# Patient Record
Sex: FEMALE | Race: WHITE | NOT HISPANIC OR LATINO | Employment: UNEMPLOYED | URBAN - METROPOLITAN AREA
[De-identification: names, ages, dates, MRNs, and addresses within clinical notes are randomized per-mention and may not be internally consistent; named-entity substitution may affect disease eponyms.]

---

## 2017-02-13 ENCOUNTER — PATIENT MESSAGE (OUTPATIENT)
Dept: INTERNAL MEDICINE | Facility: CLINIC | Age: 42
End: 2017-02-13

## 2017-02-13 RX ORDER — OSELTAMIVIR PHOSPHATE 75 MG/1
75 CAPSULE ORAL 2 TIMES DAILY
Qty: 10 CAPSULE | Refills: 0 | Status: SHIPPED | OUTPATIENT
Start: 2017-02-13 | End: 2017-02-18

## 2017-04-23 ENCOUNTER — HOSPITAL ENCOUNTER (EMERGENCY)
Facility: HOSPITAL | Age: 42
Discharge: HOME | End: 2017-04-23
Attending: EMERGENCY MEDICINE
Payer: COMMERCIAL

## 2017-04-23 VITALS
HEART RATE: 61 BPM | TEMPERATURE: 98 F | SYSTOLIC BLOOD PRESSURE: 111 MMHG | RESPIRATION RATE: 16 BRPM | OXYGEN SATURATION: 100 % | HEIGHT: 65 IN | WEIGHT: 180 LBS | BODY MASS INDEX: 29.99 KG/M2 | DIASTOLIC BLOOD PRESSURE: 69 MMHG

## 2017-04-23 DIAGNOSIS — R10.9 ABDOMINAL PAIN: ICD-10-CM

## 2017-04-23 LAB
ALBUMIN SERPL BCP-MCNC: 3.9 G/DL
ALP SERPL-CCNC: 61 U/L
ALT SERPL W/O P-5'-P-CCNC: 15 U/L
ANION GAP SERPL CALC-SCNC: 9 MMOL/L
AST SERPL-CCNC: 15 U/L
B-HCG UR QL: NEGATIVE
BACTERIA #/AREA URNS HPF: ABNORMAL /HPF
BASOPHILS # BLD AUTO: 0.04 K/UL
BASOPHILS NFR BLD: 0.6 %
BILIRUB SERPL-MCNC: 0.4 MG/DL
BILIRUB UR QL STRIP: NEGATIVE
BUN SERPL-MCNC: 10 MG/DL
CALCIUM SERPL-MCNC: 9.4 MG/DL
CHLORIDE SERPL-SCNC: 108 MMOL/L
CLARITY UR: CLEAR
CO2 SERPL-SCNC: 25 MMOL/L
COLOR UR: YELLOW
CREAT SERPL-MCNC: 0.8 MG/DL
DIFFERENTIAL METHOD: NORMAL
EOSINOPHIL # BLD AUTO: 0.1 K/UL
EOSINOPHIL NFR BLD: 1.5 %
ERYTHROCYTE [DISTWIDTH] IN BLOOD BY AUTOMATED COUNT: 13 %
EST. GFR  (AFRICAN AMERICAN): >60 ML/MIN/1.73 M^2
EST. GFR  (NON AFRICAN AMERICAN): >60 ML/MIN/1.73 M^2
GLUCOSE SERPL-MCNC: 98 MG/DL
GLUCOSE UR QL STRIP: NEGATIVE
HCT VFR BLD AUTO: 37.8 %
HGB BLD-MCNC: 12.3 G/DL
HGB UR QL STRIP: ABNORMAL
KETONES UR QL STRIP: NEGATIVE
LEUKOCYTE ESTERASE UR QL STRIP: ABNORMAL
LIPASE SERPL-CCNC: 33 U/L
LYMPHOCYTES # BLD AUTO: 2.2 K/UL
LYMPHOCYTES NFR BLD: 34 %
MCH RBC QN AUTO: 29.1 PG
MCHC RBC AUTO-ENTMCNC: 32.5 %
MCV RBC AUTO: 89 FL
MICROSCOPIC COMMENT: ABNORMAL
MONOCYTES # BLD AUTO: 0.4 K/UL
MONOCYTES NFR BLD: 6.7 %
NEUTROPHILS # BLD AUTO: 3.8 K/UL
NEUTROPHILS NFR BLD: 57.2 %
NITRITE UR QL STRIP: NEGATIVE
PH UR STRIP: 7 [PH] (ref 5–8)
PLATELET # BLD AUTO: 253 K/UL
PMV BLD AUTO: 10.3 FL
POTASSIUM SERPL-SCNC: 3.9 MMOL/L
PROT SERPL-MCNC: 7 G/DL
PROT UR QL STRIP: NEGATIVE
RBC # BLD AUTO: 4.23 M/UL
RBC #/AREA URNS HPF: 1 /HPF (ref 0–4)
SODIUM SERPL-SCNC: 142 MMOL/L
SP GR UR STRIP: <=1.005 (ref 1–1.03)
SQUAMOUS #/AREA URNS HPF: 3 /HPF
URN SPEC COLLECT METH UR: ABNORMAL
UROBILINOGEN UR STRIP-ACNC: NEGATIVE EU/DL
WBC # BLD AUTO: 6.59 K/UL
WBC #/AREA URNS HPF: 6 /HPF (ref 0–5)

## 2017-04-23 PROCEDURE — 80053 COMPREHEN METABOLIC PANEL: CPT

## 2017-04-23 PROCEDURE — 99284 EMERGENCY DEPT VISIT MOD MDM: CPT

## 2017-04-23 PROCEDURE — 25500020 PHARM REV CODE 255: Performed by: EMERGENCY MEDICINE

## 2017-04-23 PROCEDURE — 85025 COMPLETE CBC W/AUTO DIFF WBC: CPT

## 2017-04-23 PROCEDURE — 83690 ASSAY OF LIPASE: CPT

## 2017-04-23 PROCEDURE — 25000003 PHARM REV CODE 250: Performed by: EMERGENCY MEDICINE

## 2017-04-23 PROCEDURE — 81000 URINALYSIS NONAUTO W/SCOPE: CPT

## 2017-04-23 PROCEDURE — 81025 URINE PREGNANCY TEST: CPT

## 2017-04-23 RX ORDER — ONDANSETRON 2 MG/ML
4 INJECTION INTRAMUSCULAR; INTRAVENOUS
Status: DISCONTINUED | OUTPATIENT
Start: 2017-04-23 | End: 2017-04-23 | Stop reason: HOSPADM

## 2017-04-23 RX ORDER — DICYCLOMINE HYDROCHLORIDE 20 MG/1
20 TABLET ORAL 2 TIMES DAILY
Qty: 10 TABLET | Refills: 0 | Status: SHIPPED | OUTPATIENT
Start: 2017-04-23 | End: 2017-04-27

## 2017-04-23 RX ORDER — DICYCLOMINE HYDROCHLORIDE 20 MG/1
20 TABLET ORAL
Status: COMPLETED | OUTPATIENT
Start: 2017-04-23 | End: 2017-04-23

## 2017-04-23 RX ADMIN — IOHEXOL 75 ML: 350 INJECTION, SOLUTION INTRAVENOUS at 03:04

## 2017-04-23 RX ADMIN — DICYCLOMINE HYDROCHLORIDE 20 MG: 20 TABLET ORAL at 02:04

## 2017-04-23 NOTE — DISCHARGE INSTRUCTIONS
Abdominal Pain    Abdominal pain is pain in the stomach or belly area. Everyone has this pain from time to time. In many cases it goes away on its own. But abdominal pain can sometimes be due to a serious problem, such as appendicitis. So its important to know when to seek help.  Causes of abdominal pain  There are many possible causes of abdominal pain. Common causes in adults include:  · Constipation, diarrhea, or gas  · Stomach acid flowing back up into the esophagus (acid reflux or heartburn)  · Severe acid reflux, called GERD (gastroesophageal reflux disease)  · A sore in the lining of the stomach or small intestine (peptic ulcer)  · Inflammation of the gallbladder, liver, or pancreas  · Gallstones or kidney stones  · Appendicitis   · Intestinal blockage   · An internal organ pushing through a muscle or other tissue (hernia)  · Urinary tract infections  · In women, menstrual cramps, fibroids, or endometriosis  · Inflammation or infection of the intestines  Diagnosing the cause of abdominal pain  Your healthcare provider will do a physical exam help find the cause of your pain. If needed, tests will be ordered. Belly pain has many possible causes. So it can be hard to find the reason for your pain. Giving details about your pain can help. Tell your provider where and when you feel the pain, and what makes it better or worse. Also let your provider know if you have other symptoms such as:  · Fever  · Tiredness  · Upset stomach (nausea)  · Vomiting  · Changes in bathroom habits  Treating abdominal pain  Some causes of pain need emergency medical treatment right away. These include appendicitis or a bowel blockage. Other problems can be treated with rest, fluids, or medicines. Your healthcare provider can give you specific instructions for treatment or self-care based on what is causing your pain.  If you have vomiting or diarrhea, sip water or other clear fluids. When you are ready to eat solid foods again,  start with small amounts of easy-to-digest, low-fat foods. These include apple sauce, toast, or crackers.   When to seek medical care  Call 911 or go to the hospital right away if you:  · Cant pass stool and are vomiting  · Are vomiting blood or have bloody diarrhea or black, tarry diarrhea  · Have chest, neck, or shoulder pain  · Feel like you might pass out  · Have pain in your shoulder blades with nausea  · Have sudden, severe belly pain  · Have new, severe pain unlike any you have felt before  · Have a belly that is rigid, hard, and tender to touch  Call your healthcare provider if you have:  · Pain for more than 5 days  · Bloating for more than 2 days  · Diarrhea for more than 5 days  · A fever of 100.4°F (38.0°C) or higher, or as directed by your provider  · Pain that gets worse  · Weight loss for no reason  · Continued lack of appetite  · Blood in your stool  How to prevent abdominal pain  Here are some tips to help prevent abdominal pain:  · Eat smaller amounts of food at one time.  · Avoid greasy, fried, or other high-fat foods.  · Avoid foods that give you gas.  · Exercise regularly.  · Drink plenty of fluids.  To help prevent GERD symptoms:  · Quit smoking.  · Reduce alcohol and certain foods that increase stomach acid.  · Avoid aspirin and over-the-counter pain and fever medicines (NSAIDS or nonsteroidal anti-inflammatory drugs), if possible  · Lose extra weight.  · Finish eating at least 2 hours before you go to bed or lie down.  · Raise the head of your bed.  Date Last Reviewed: 7/1/2016  © 0517-8439 [employer]. [address] [place] [address], [place] [address]. All rights reserved. This information is not intended as a substitute for professional medical care. Always follow your healthcare professional's instructions.          Abdominal Pain    Abdominal pain is pain in the stomach or belly area. Everyone has this pain from time to time. In many cases it goes away on its own. But abdominal  pain can sometimes be due to a serious problem, such as appendicitis. So its important to know when to seek help.  Causes of abdominal pain  There are many possible causes of abdominal pain. Common causes in adults include:  · Constipation, diarrhea, or gas  · Stomach acid flowing back up into the esophagus (acid reflux or heartburn)  · Severe acid reflux, called GERD (gastroesophageal reflux disease)  · A sore in the lining of the stomach or small intestine (peptic ulcer)  · Inflammation of the gallbladder, liver, or pancreas  · Gallstones or kidney stones  · Appendicitis   · Intestinal blockage   · An internal organ pushing through a muscle or other tissue (hernia)  · Urinary tract infections  · In women, menstrual cramps, fibroids, or endometriosis  · Inflammation or infection of the intestines  Diagnosing the cause of abdominal pain  Your healthcare provider will do a physical exam help find the cause of your pain. If needed, tests will be ordered. Belly pain has many possible causes. So it can be hard to find the reason for your pain. Giving details about your pain can help. Tell your provider where and when you feel the pain, and what makes it better or worse. Also let your provider know if you have other symptoms such as:  · Fever  · Tiredness  · Upset stomach (nausea)  · Vomiting  · Changes in bathroom habits  Treating abdominal pain  Some causes of pain need emergency medical treatment right away. These include appendicitis or a bowel blockage. Other problems can be treated with rest, fluids, or medicines. Your healthcare provider can give you specific instructions for treatment or self-care based on what is causing your pain.  If you have vomiting or diarrhea, sip water or other clear fluids. When you are ready to eat solid foods again, start with small amounts of easy-to-digest, low-fat foods. These include apple sauce, toast, or crackers.   When to seek medical care  Call 911 or go to the hospital right  away if you:  · Cant pass stool and are vomiting  · Are vomiting blood or have bloody diarrhea or black, tarry diarrhea  · Have chest, neck, or shoulder pain  · Feel like you might pass out  · Have pain in your shoulder blades with nausea  · Have sudden, severe belly pain  · Have new, severe pain unlike any you have felt before  · Have a belly that is rigid, hard, and tender to touch  Call your healthcare provider if you have:  · Pain for more than 5 days  · Bloating for more than 2 days  · Diarrhea for more than 5 days  · A fever of 100.4°F (38.0°C) or higher, or as directed by your provider  · Pain that gets worse  · Weight loss for no reason  · Continued lack of appetite  · Blood in your stool  How to prevent abdominal pain  Here are some tips to help prevent abdominal pain:  · Eat smaller amounts of food at one time.  · Avoid greasy, fried, or other high-fat foods.  · Avoid foods that give you gas.  · Exercise regularly.  · Drink plenty of fluids.  To help prevent GERD symptoms:  · Quit smoking.  · Reduce alcohol and certain foods that increase stomach acid.  · Avoid aspirin and over-the-counter pain and fever medicines (NSAIDS or nonsteroidal anti-inflammatory drugs), if possible  · Lose extra weight.  · Finish eating at least 2 hours before you go to bed or lie down.  · Raise the head of your bed.  Date Last Reviewed: 7/1/2016  © 2941-8551 [employer]. [address] [place] [address], [place] [address]. All rights reserved. This information is not intended as a substitute for professional medical care. Always follow your healthcare professional's instructions.

## 2017-04-23 NOTE — ED TRIAGE NOTES
Pt c/o abd pain above umbilicus, that started upon waking this AM at 0500.  Pt denies N/V/D.  Pt reports increased urinary frequency.  LMP 4/21/17.

## 2017-04-23 NOTE — ED PROVIDER NOTES
"SCRIBE #1 NOTE: I, Lashawn Nogueira, am scribing for, and in the presence of, Bentley Cassidy MD. I have scribed the entire note.      History      Chief Complaint   Patient presents with    Abdominal Pain     c/o severe upper middle abd pain, denies NVD       Review of patient's allergies indicates:   Allergen Reactions    No known drug allergies         HPI   HPI    4/23/2017, 2:08 PM   History obtained from the patient      History of Present Illness: Josh Simental is a 42 y.o. female patient who presents to the Emergency Department for abd pain which onset gradually earlier today. Abd pain is located to umbilical region and does not radiate. Symptoms are constant and moderate in severity. Sxs exacerbate with laying down. Pt describes abd pain as "sharp". No mitigating factors reported. Associated sxs include urinary frequency. Patient denies any nausea, emesis, diarrhea, hematochezia, hematuria, dysuria, constipation, fever, chills, and all other sxs at this time. Pt states that she is currently menstruating. No further complaints or concerns at this time.         Arrival mode: Personal vehicle      PCP: Oneil Metz MD       Past Medical History:  Past Medical History:   Diagnosis Date    Allergy     Anxiety     Depression     Migraines     Sinusitis        Past Surgical History:  Past Surgical History:   Procedure Laterality Date    TUBAL LIGATION           Family History:  Family History   Problem Relation Age of Onset    Diabetes Mother     Thyroid disease Mother     Diabetes Father     Cancer Father     Cancer Maternal Grandmother     Diabetes Maternal Grandfather     Cancer Maternal Grandfather     Stroke Paternal Grandmother        Social History:  Social History     Social History Main Topics    Smoking status: Former Smoker     Packs/day: 0.25     Years: 2.00     Types: Cigarettes     Quit date: 6/17/1999    Smokeless tobacco: Never Used      Comment: maybe 1 pk a week     " Alcohol use No    Drug use: No    Sexual activity: Yes     Partners: Male     Birth control/ protection: None       ROS   Review of Systems   Constitutional: Negative for chills and fever.   HENT: Negative for sore throat.    Respiratory: Negative for shortness of breath.    Cardiovascular: Negative for chest pain.   Gastrointestinal: Positive for abdominal pain. Negative for blood in stool, constipation, diarrhea, nausea and vomiting.   Genitourinary: Positive for frequency. Negative for dysuria and hematuria.   Musculoskeletal: Negative for back pain.   Skin: Negative for rash.   Neurological: Negative for weakness.   Hematological: Does not bruise/bleed easily.   All other systems reviewed and are negative.      Physical Exam    Initial Vitals   BP Pulse Resp Temp SpO2   04/23/17 1317 04/23/17 1317 04/23/17 1317 04/23/17 1317 04/23/17 1317   122/68 61 16 98.1 °F (36.7 °C) 100 %      Physical Exam  Nursing Notes and Vital Signs Reviewed.  Constitutional: Patient is in no apparent distress. Awake and alert. Well-developed and well-nourished.  Head: Atraumatic. Normocephalic.  Eyes: PERRL. EOM intact. Conjunctivae are not pale. No scleral icterus.  ENT: Mucous membranes are moist. Oropharynx is clear and symmetric.    Neck: Supple. Full ROM. No lymphadenopathy.  Cardiovascular: Regular rate. Regular rhythm. No murmurs, rubs, or gallops. Distal pulses are 2+ and symmetric.  Pulmonary/Chest: No respiratory distress. Clear to auscultation bilaterally. No wheezing, rales, or rhonchi.  Abdominal: Soft and non-distended.  Generalized abd tenderness.  No rebound, guarding, or rigidity. Good bowel sounds.  Genitourinary: No CVA tenderness  Musculoskeletal: Moves all extremities. No obvious deformities. No edema. No calf tenderness.  Skin: Warm and dry.  Neurological: Grossly and neurologically intact and oriented x3.  Psychiatric: Normal affect. Good eye contact. Appropriate in content.    ED Course    Procedures  ED  "Vital Signs:  Vitals:    04/23/17 1317   BP: 122/68   Pulse: 61   Resp: 16   Temp: 98.1 °F (36.7 °C)   TempSrc: Oral   SpO2: 100%   Weight: 81.6 kg (180 lb)   Height: 5' 5" (1.651 m)       Abnormal Lab Results:  Labs Reviewed   URINALYSIS - Abnormal; Notable for the following:        Result Value    Specific Gravity, UA <=1.005 (*)     Occult Blood UA 2+ (*)     Leukocytes, UA Trace (*)     All other components within normal limits   URINALYSIS MICROSCOPIC - Abnormal; Notable for the following:     WBC, UA 6 (*)     All other components within normal limits   CBC W/ AUTO DIFFERENTIAL   COMPREHENSIVE METABOLIC PANEL   PREGNANCY TEST, URINE RAPID   LIPASE        All Lab Results:  Results for orders placed or performed during the hospital encounter of 04/23/17   CBC auto differential   Result Value Ref Range    WBC 6.59 3.90 - 12.70 K/uL    RBC 4.23 4.00 - 5.40 M/uL    Hemoglobin 12.3 12.0 - 16.0 g/dL    Hematocrit 37.8 37.0 - 48.5 %    MCV 89 82 - 98 fL    MCH 29.1 27.0 - 31.0 pg    MCHC 32.5 32.0 - 36.0 %    RDW 13.0 11.5 - 14.5 %    Platelets 253 150 - 350 K/uL    MPV 10.3 9.2 - 12.9 fL    Gran # 3.8 1.8 - 7.7 K/uL    Lymph # 2.2 1.0 - 4.8 K/uL    Mono # 0.4 0.3 - 1.0 K/uL    Eos # 0.1 0.0 - 0.5 K/uL    Baso # 0.04 0.00 - 0.20 K/uL    Gran% 57.2 38.0 - 73.0 %    Lymph% 34.0 18.0 - 48.0 %    Mono% 6.7 4.0 - 15.0 %    Eosinophil% 1.5 0.0 - 8.0 %    Basophil% 0.6 0.0 - 1.9 %    Differential Method Automated    Comprehensive metabolic panel   Result Value Ref Range    Sodium 142 136 - 145 mmol/L    Potassium 3.9 3.5 - 5.1 mmol/L    Chloride 108 95 - 110 mmol/L    CO2 25 23 - 29 mmol/L    Glucose 98 70 - 110 mg/dL    BUN, Bld 10 6 - 20 mg/dL    Creatinine 0.8 0.5 - 1.4 mg/dL    Calcium 9.4 8.7 - 10.5 mg/dL    Total Protein 7.0 6.0 - 8.4 g/dL    Albumin 3.9 3.5 - 5.2 g/dL    Total Bilirubin 0.4 0.1 - 1.0 mg/dL    Alkaline Phosphatase 61 55 - 135 U/L    AST 15 10 - 40 U/L    ALT 15 10 - 44 U/L    Anion Gap 9 8 - 16 mmol/L "    eGFR if African American >60 >60 mL/min/1.73 m^2    eGFR if non African American >60 >60 mL/min/1.73 m^2   Urinalysis   Result Value Ref Range    Specimen UA Urine, Clean Catch     Color, UA Yellow Yellow, Straw, Jennifer    Appearance, UA Clear Clear    pH, UA 7.0 5.0 - 8.0    Specific Gravity, UA <=1.005 (A) 1.005 - 1.030    Protein, UA Negative Negative    Glucose, UA Negative Negative    Ketones, UA Negative Negative    Bilirubin (UA) Negative Negative    Occult Blood UA 2+ (A) Negative    Nitrite, UA Negative Negative    Urobilinogen, UA Negative <2.0 EU/dL    Leukocytes, UA Trace (A) Negative   Pregnancy, urine rapid   Result Value Ref Range    Preg Test, Ur Negative    Lipase   Result Value Ref Range    Lipase 33 4 - 60 U/L   Urinalysis Microscopic   Result Value Ref Range    RBC, UA 1 0 - 4 /hpf    WBC, UA 6 (H) 0 - 5 /hpf    Bacteria, UA Occasional None-Occ /hpf    Squam Epithel, UA 3 /hpf    Microscopic Comment SEE COMMENT          Imaging Results:  Imaging Results         CT Abdomen Pelvis With Contrast (Final result) Result time:  04/23/17 16:27:48    Final result by Fili Grimes Jr., MD (04/23/17 16:27:48)    Impression:      1. There is cholelithiasis.  No inflammatory change about the gallbladder on CT evaluation.    2.  The appendix is normal.        Electronically signed by: FILI GRIMES M.D.  Date:     04/23/17  Time:    16:27     Narrative:    CT ABDOMEN PELVIS WITH CONTRAST    Clinical history: abdominal pain    TECHNIQUE: Routine abdominal and pelvic CT performed after the IV administration of 75 mL Omnipaque 350. Coronal and sagittal images obtained. All CT scans at this facility use dose modulation, iterative reconstruction, and/or weight based dosing when appropriate to reduce radiation dose to as low as reasonably achievable.    Comparison: None    Findings: No acute disease is seen in the lung bases.  No acute osseous abnormality or suspicious bone marrow lesion identified.     The  distal esophagus, stomach, and small bowel loops are normal.  There is gas and stool within the colon.  No evidence of bowel inflammation.  The appendix is normal. No free intraperitoneal fluid, free air or abscess identified.    The liver is within normal limits. There cholesterol stones within the gallbladder.  No inflammatory change about the gallbladder. The pancreas, spleen and adrenals are unremarkable. No aortic aneurysm is identified. The kidneys and ureters are unremarkable. The bladder is within normal limits. The uterus is mildly enlarged.  Slight thickening of the endometrial stripe.  This may be physiologic.  The ovaries are not unusual in appearance. No pathologically enlarged lymph nodes are identified.                    The Emergency Provider reviewed the vital signs and test results, which are outlined above.    ED Discussion   4:31 PM: Reassessed pt at this time. Pt is awake, alert and in no distress. Pt states her condition has improved at this time. Discussed with pt all pertinent ED information and results. Discussed pt dx and plan of tx. Gave pt all f/u and return to the ED instructions. All questions and concerns were addressed at this time. Pt expresses understanding of information and instructions, and is comfortable with plan to discharge. Pt is stable for discharge.    I discussed with patient and/or family/caretaker that evaluation in the ED does not suggest any emergent or life threatening medical conditions requiring immediate intervention beyond what was provided in the ED, and I believe patient is safe for discharge.  Regardless, an unremarkable evaluation in the ED does not preclude the development or presence of a serious of life threatening condition. As such, patient was instructed to return immediately for any worsening or change in current symptoms.      ED Medication(s):  Medications   ondansetron injection 4 mg (4 mg Intravenous Not Given 4/23/17 1415)   dicyclomine tablet 20  mg (20 mg Oral Given 4/23/17 1448)   omnipaque 350 iohexol 75 mL (75 mLs Intravenous Given 4/23/17 8738)       New Prescriptions    No medications on file             Medical Decision Making    Medical Decision Making:   Clinical Tests:   Lab Tests: Ordered and Reviewed  Radiological Study: Ordered and Reviewed           Scribe Attestation:   Scribe #1: I performed the above scribed service and the documentation accurately describes the services I performed. I attest to the accuracy of the note.    Attending:   Physician Attestation Statement for Scribe #1: I, Bentley Cassidy MD, personally performed the services described in this documentation, as scribed by Lashawn Nogueira, in my presence, and it is both accurate and complete.          Clinical Impression       ICD-10-CM ICD-9-CM   1. Abdominal pain R10.9 789.00       Disposition:   Disposition: Discharged  Condition: Stable         Bentley Cassidy MD  04/23/17 1945

## 2017-04-23 NOTE — ED AVS SNAPSHOT
[facility] -   [id number] [facility] [id number]               Josh Simental   2017  1:47 PM   ED    Description:  Female : 1975   Department:  [facility]           Your Care was Coordinated By:     Provider Role From To    Bentley Cassidy MD Attending Provider 17 1699 --      Reason for Visit     Abdominal Pain           Diagnoses this Visit        Comments    Abdominal pain           ED Disposition     None           To Do List           Follow-up Information     Follow up with Oneil Metz MD In 1 day.    Specialty:  Family Medicine    Why:  Patient should return to the ED for any concerns or worsening of condition.    Contact information:    [address] [address] [address] [phone]         These Medications        Disp Refills Start End    dicyclomine (BENTYL) 20 mg tablet 10 tablet 0 2017    Take 1 tablet (20 mg total) by mouth 2 (two) times daily. - Oral      [facility]     [facility] -  Assistance  Unless otherwise directed by your provider, please contact [facility], our nurse care line that is available for  assistance.     Registered nurses in the [facility] provide: appointment scheduling, clinical advisement, health education, and other advisory services.  Call: [phone] (toll free)               Medications           Message regarding Medications     Verify the changes and/or additions to your medication regime listed below are the same as discussed with your clinician today.  If any of these changes or additions are incorrect, please notify your healthcare provider.        START taking these NEW medications        Refills    dicyclomine (BENTYL) 20 mg tablet 0    Sig: Take 1 tablet (20 mg total) by mouth 2 (two) times daily.    Class: Print    Route: Oral      These medications were administered today        Dose  "Freq    dicyclomine tablet 20 mg 20 mg ED 1 Time    Sig: Take 1 tablet (20 mg total) by mouth ED 1 Time.    Class: Normal    Route: Oral    ondansetron injection 4 mg 4 mg ED 1 Time    Sig: Inject 4 mg into the vein ED 1 Time.    Class: Normal    Route: Intravenous    omnipaque 350 iohexol 75 mL 75 mL IMG once as needed    Sig: Inject 75 mLs into the vein ONCE PRN for contrast.    Class: Normal    Route: Intravenous           Verify that the below list of medications is an accurate representation of the medications you are currently taking.  If none reported, the list may be blank. If incorrect, please contact your healthcare provider. Carry this list with you in case of emergency.           Current Medications     ASCORBIC ACID/VITAMIN E/BIOTIN (HAIR, SKIN, NAILS WITH BIOTIN ORAL) Take by mouth.    LACTOBACILLUS ACIDOPHILUS (PROBIOTIC ORAL) Take 1 tablet by mouth once daily.    dicyclomine (BENTYL) 20 mg tablet Take 1 tablet (20 mg total) by mouth 2 (two) times daily.    ondansetron injection 4 mg Inject 4 mg into the vein ED 1 Time.           Clinical Reference Information           Your Vitals Were     BP Pulse Temp Resp Height Weight    122/68 (BP Location: Right arm, Patient Position: Sitting) 61 98.1 °F (36.7 °C) (Oral) 16 5' 5" (1.651 m) 81.6 kg (180 lb)    Last Period SpO2 BMI          04/21/2017 (Exact Date) 100% 29.95 kg/m2        Allergies as of 4/23/2017        Reactions    No Known Drug Allergies       Immunizations Administered on Date of Encounter - 4/23/2017     None      ED Micro, Lab, POCT     Start Ordered       Status Ordering Provider    04/23/17 1409 04/23/17 1409  CBC auto differential  STAT      Final result     04/23/17 1409 04/23/17 1409  Comprehensive metabolic panel  STAT      Final result     04/23/17 1409 04/23/17 1409  Urinalysis  STAT      Final result     04/23/17 1409 04/23/17 1409  Pregnancy, urine rapid  STAT      Final result     04/23/17 1409 04/23/17 1409  Lipase  STAT      " Final result     04/23/17 1409 04/23/17 1409  Urinalysis Microscopic  Once      Final result       ED Imaging Orders     Start Ordered       Status Ordering Provider    04/23/17 1409 04/23/17 1409  CT Abdomen Pelvis With Contrast  1 time imaging      Final result         Discharge Instructions         Abdominal Pain    Abdominal pain is pain in the stomach or belly area. Everyone has this pain from time to time. In many cases it goes away on its own. But abdominal pain can sometimes be due to a serious problem, such as appendicitis. So its important to know when to seek help.  Causes of abdominal pain  There are many possible causes of abdominal pain. Common causes in adults include:  · Constipation, diarrhea, or gas  · Stomach acid flowing back up into the esophagus (acid reflux or heartburn)  · Severe acid reflux, called GERD (gastroesophageal reflux disease)  · A sore in the lining of the stomach or small intestine (peptic ulcer)  · Inflammation of the gallbladder, liver, or pancreas  · Gallstones or kidney stones  · Appendicitis   · Intestinal blockage   · An internal organ pushing through a muscle or other tissue (hernia)  · Urinary tract infections  · In women, menstrual cramps, fibroids, or endometriosis  · Inflammation or infection of the intestines  Diagnosing the cause of abdominal pain  Your healthcare provider will do a physical exam help find the cause of your pain. If needed, tests will be ordered. Belly pain has many possible causes. So it can be hard to find the reason for your pain. Giving details about your pain can help. Tell your provider where and when you feel the pain, and what makes it better or worse. Also let your provider know if you have other symptoms such as:  · Fever  · Tiredness  · Upset stomach (nausea)  · Vomiting  · Changes in bathroom habits  Treating abdominal pain  Some causes of pain need emergency medical treatment right away. These include appendicitis or a bowel blockage.  Other problems can be treated with rest, fluids, or medicines. Your healthcare provider can give you specific instructions for treatment or self-care based on what is causing your pain.  If you have vomiting or diarrhea, sip water or other clear fluids. When you are ready to eat solid foods again, start with small amounts of easy-to-digest, low-fat foods. These include apple sauce, toast, or crackers.   When to seek medical care  Call 911 or go to the hospital right away if you:  · Cant pass stool and are vomiting  · Are vomiting blood or have bloody diarrhea or black, tarry diarrhea  · Have chest, neck, or shoulder pain  · Feel like you might pass out  · Have pain in your shoulder blades with nausea  · Have sudden, severe belly pain  · Have new, severe pain unlike any you have felt before  · Have a belly that is rigid, hard, and tender to touch  Call your healthcare provider if you have:  · Pain for more than 5 days  · Bloating for more than 2 days  · Diarrhea for more than 5 days  · A fever of 100.4°F (38.0°C) or higher, or as directed by your provider  · Pain that gets worse  · Weight loss for no reason  · Continued lack of appetite  · Blood in your stool  How to prevent abdominal pain  Here are some tips to help prevent abdominal pain:  · Eat smaller amounts of food at one time.  · Avoid greasy, fried, or other high-fat foods.  · Avoid foods that give you gas.  · Exercise regularly.  · Drink plenty of fluids.  To help prevent GERD symptoms:  · Quit smoking.  · Reduce alcohol and certain foods that increase stomach acid.  · Avoid aspirin and over-the-counter pain and fever medicines (NSAIDS or nonsteroidal anti-inflammatory drugs), if possible  · Lose extra weight.  · Finish eating at least 2 hours before you go to bed or lie down.  · Raise the head of your bed.  Date Last Reviewed: 7/1/2016  © 9226-5502 [email or web address]. [address] [place] [address], [place] [address]. All rights reserved. This information  is not intended as a substitute for professional medical care. Always follow your healthcare professional's instructions.          Abdominal Pain    Abdominal pain is pain in the stomach or belly area. Everyone has this pain from time to time. In many cases it goes away on its own. But abdominal pain can sometimes be due to a serious problem, such as appendicitis. So its important to know when to seek help.  Causes of abdominal pain  There are many possible causes of abdominal pain. Common causes in adults include:  · Constipation, diarrhea, or gas  · Stomach acid flowing back up into the esophagus (acid reflux or heartburn)  · Severe acid reflux, called GERD (gastroesophageal reflux disease)  · A sore in the lining of the stomach or small intestine (peptic ulcer)  · Inflammation of the gallbladder, liver, or pancreas  · Gallstones or kidney stones  · Appendicitis   · Intestinal blockage   · An internal organ pushing through a muscle or other tissue (hernia)  · Urinary tract infections  · In women, menstrual cramps, fibroids, or endometriosis  · Inflammation or infection of the intestines  Diagnosing the cause of abdominal pain  Your healthcare provider will do a physical exam help find the cause of your pain. If needed, tests will be ordered. Belly pain has many possible causes. So it can be hard to find the reason for your pain. Giving details about your pain can help. Tell your provider where and when you feel the pain, and what makes it better or worse. Also let your provider know if you have other symptoms such as:  · Fever  · Tiredness  · Upset stomach (nausea)  · Vomiting  · Changes in bathroom habits  Treating abdominal pain  Some causes of pain need emergency medical treatment right away. These include appendicitis or a bowel blockage. Other problems can be treated with rest, fluids, or medicines. Your healthcare provider can give you specific instructions for treatment or self-care based on what is  causing your pain.  If you have vomiting or diarrhea, sip water or other clear fluids. When you are ready to eat solid foods again, start with small amounts of easy-to-digest, low-fat foods. These include apple sauce, toast, or crackers.   When to seek medical care  Call 911 or go to the hospital right away if you:  · Cant pass stool and are vomiting  · Are vomiting blood or have bloody diarrhea or black, tarry diarrhea  · Have chest, neck, or shoulder pain  · Feel like you might pass out  · Have pain in your shoulder blades with nausea  · Have sudden, severe belly pain  · Have new, severe pain unlike any you have felt before  · Have a belly that is rigid, hard, and tender to touch  Call your healthcare provider if you have:  · Pain for more than 5 days  · Bloating for more than 2 days  · Diarrhea for more than 5 days  · A fever of 100.4°F (38.0°C) or higher, or as directed by your provider  · Pain that gets worse  · Weight loss for no reason  · Continued lack of appetite  · Blood in your stool  How to prevent abdominal pain  Here are some tips to help prevent abdominal pain:  · Eat smaller amounts of food at one time.  · Avoid greasy, fried, or other high-fat foods.  · Avoid foods that give you gas.  · Exercise regularly.  · Drink plenty of fluids.  To help prevent GERD symptoms:  · Quit smoking.  · Reduce alcohol and certain foods that increase stomach acid.  · Avoid aspirin and over-the-counter pain and fever medicines (NSAIDS or nonsteroidal anti-inflammatory drugs), if possible  · Lose extra weight.  · Finish eating at least 2 hours before you go to bed or lie down.  · Raise the head of your bed.  Date Last Reviewed: 7/1/2016  © 3445-3547 [employer] [address] [place] [address], [place] [address]. All rights reserved. This information is not intended as a substitute for professional medical care. Always follow your healthcare professional's instructions.          Smoking Cessation     If you would  like to quit smoking:   You may be eligible for free services if you are a [place] resident and started smoking cigarettes before September 1, 1988.  Call the Smoking Cessation Trust (SCT) toll free at (520) 097-8119 or (866) 919-4824.   Call 1-800-QUIT-NOW if you do not meet the above criteria.   Contact us via email: [email or web address]   View our website for more information: [email or web address]         [facility]  complies with applicable Federal civil rights laws and does not discriminate on the basis of race, color, national origin, age, disability, or sex.        Language Assistance Services     ATTENTION: Language assistance services are available, free of charge. Please call [phone].      ATENCIÓN: Si habla tor, tiene a rosas disposición servicios gratuitos de asistencia lingüística. Llame al [phone].     CHÚ Ý: N?u b?n nói Ti?ng Vi?t, có các d?ch v? h? tr? ngôn ng? mi?n phí dành cho b?n. G?i s? [phone].

## 2017-04-27 ENCOUNTER — OFFICE VISIT (OUTPATIENT)
Dept: INTERNAL MEDICINE | Facility: CLINIC | Age: 42
End: 2017-04-27
Payer: COMMERCIAL

## 2017-04-27 VITALS
DIASTOLIC BLOOD PRESSURE: 60 MMHG | BODY MASS INDEX: 30.59 KG/M2 | WEIGHT: 183.63 LBS | OXYGEN SATURATION: 99 % | TEMPERATURE: 97 F | HEIGHT: 65 IN | HEART RATE: 52 BPM | SYSTOLIC BLOOD PRESSURE: 100 MMHG

## 2017-04-27 DIAGNOSIS — R10.10 PAIN OF UPPER ABDOMEN: Primary | ICD-10-CM

## 2017-04-27 PROCEDURE — 99213 OFFICE O/P EST LOW 20 MIN: CPT | Mod: S$GLB,,, | Performed by: FAMILY MEDICINE

## 2017-04-27 PROCEDURE — 1160F RVW MEDS BY RX/DR IN RCRD: CPT | Mod: S$GLB,,, | Performed by: FAMILY MEDICINE

## 2017-04-27 PROCEDURE — 99999 PR PBB SHADOW E&M-EST. PATIENT-LVL III: CPT | Mod: PBBFAC,,, | Performed by: FAMILY MEDICINE

## 2017-04-27 NOTE — PROGRESS NOTES
Chief Complaint:    Chief Complaint   Patient presents with    Hospital Follow Up       History of Present Illness:    Patient is here status post ED follow-up.  She was seen in the ED with complaints of abdominal pain that was in the upper abdomen no nausea vomiting fever no constipation or trouble urinating.  The pain lasted 5-6 hours then it resolves spontaneously.  She had extensive workup in the ED including labs and a CAT scan which came back normal.  Patient is feeling now normal.    ROS:  Review of Systems   Constitutional: Negative for activity change, chills, fatigue, fever and unexpected weight change.   HENT: Negative for congestion, ear discharge, ear pain, hearing loss, postnasal drip and rhinorrhea.    Eyes: Negative for pain and visual disturbance.   Respiratory: Negative for cough, chest tightness and shortness of breath.    Cardiovascular: Negative for chest pain and palpitations.   Gastrointestinal: Negative for abdominal pain, diarrhea and vomiting.   Endocrine: Negative for heat intolerance.   Genitourinary: Negative for dysuria, flank pain, frequency and hematuria.   Musculoskeletal: Negative for back pain, gait problem and neck pain.   Skin: Negative for color change and rash.   Neurological: Negative for dizziness, tremors, seizures, numbness and headaches.   Psychiatric/Behavioral: Negative for agitation, hallucinations, self-injury, sleep disturbance and suicidal ideas. The patient is not nervous/anxious.        Past Medical History:   Diagnosis Date    Allergy     Anxiety     Depression     Migraines     Sinusitis        Social History:  Social History     Social History    Marital status:      Spouse name: N/A    Number of children: N/A    Years of education: N/A     Social History Main Topics    Smoking status: Former Smoker     Packs/day: 0.25     Years: 2.00     Types: Cigarettes     Quit date: 6/17/1999    Smokeless tobacco: Never Used      Comment: maybe 1 pk a  "week     Alcohol use No    Drug use: No    Sexual activity: Yes     Partners: Male     Birth control/ protection: None     Other Topics Concern    Not on file     Social History Narrative    No narrative on file       Family History:   family history includes Cancer in her father, maternal grandfather, and maternal grandmother; Diabetes in her father, maternal grandfather, and mother; Stroke in her paternal grandmother; Thyroid disease in her mother.    Health Maintenance   Topic Date Due    TETANUS VACCINE  03/27/1993    Influenza Vaccine  08/01/2016    Lipid Panel  10/15/2016    Mammogram  12/02/2017    Pap Smear  12/09/2018       Physical Exam:    Vital Signs  Temp: 97.4 °F (36.3 °C)  Temp src: Tympanic  Pulse: (!) 52  SpO2: 99 %  BP: 100/60  BP Location: Left arm  BP Method: Manual  Patient Position: Sitting  Pain Score: 0-No pain  Height and Weight  Height: 5' 5" (165.1 cm)  Weight: 83.3 kg (183 lb 10.3 oz)  BSA (Calculated - sq m): 1.95 sq meters  BMI (Calculated): 30.6  Weight in (lb) to have BMI = 25: 149.9]    Body mass index is 30.56 kg/(m^2).    Physical Exam   Constitutional: She is oriented to person, place, and time. She appears well-developed.   HENT:   Mouth/Throat: Oropharynx is clear and moist.   Eyes: Conjunctivae are normal. Pupils are equal, round, and reactive to light.   Neck: Normal range of motion. Neck supple.   Cardiovascular: Normal rate, regular rhythm and normal heart sounds.    No murmur heard.  Pulmonary/Chest: Effort normal and breath sounds normal. No respiratory distress. She has no wheezes. She has no rales. She exhibits no tenderness.   Abdominal: Soft. She exhibits no distension and no mass. There is no tenderness. There is no guarding.   Musculoskeletal: She exhibits no edema or tenderness.   Lymphadenopathy:     She has no cervical adenopathy.   Neurological: She is alert and oriented to person, place, and time. She has normal reflexes.   Skin: Skin is warm and dry. "   Psychiatric: She has a normal mood and affect. Her behavior is normal. Judgment and thought content normal.       Lab Results   Component Value Date    CHOL 193 10/15/2015    CHOL 185 10/15/2014    CHOL 173 11/30/2012    TRIG 99 10/15/2015    TRIG 94 10/15/2014    TRIG 87 11/30/2012    HDL 58 10/15/2015    HDL 63 10/15/2014    HDL 56 11/30/2012    TOTALCHOLEST 3.3 10/15/2015    TOTALCHOLEST 2.9 10/15/2014    TOTALCHOLEST 3.1 11/30/2012    NONHDLCHOL 135 10/15/2015    NONHDLCHOL 122 10/15/2014       Lab Results   Component Value Date    HGBA1C 5.2 10/15/2015       Assessment:      ICD-10-CM ICD-9-CM   1. Pain of upper abdomen R10.10 789.09         Plan:  Abdominal pain of unclear etiology appears to be more functional pain.  Patient's discharge records from the hospital have been reviewed.  Should it come back please come in and let us reassess.  No orders of the defined types were placed in this encounter.      Current Outpatient Prescriptions   Medication Sig Dispense Refill    ASCORBIC ACID/VITAMIN E/BIOTIN (HAIR, SKIN, NAILS WITH BIOTIN ORAL) Take by mouth.      LACTOBACILLUS ACIDOPHILUS (PROBIOTIC ORAL) Take 1 tablet by mouth once daily.       No current facility-administered medications for this visit.        Medications Discontinued During This Encounter   Medication Reason    dicyclomine (BENTYL) 20 mg tablet Patient no longer taking       No Follow-up on file.      Dr Oneil Metz MD    Disclaimer: This note is prepared using voice recognition system and as such is likely to have errors and is not proof read.

## 2017-12-15 ENCOUNTER — OFFICE VISIT (OUTPATIENT)
Dept: FAMILY MEDICINE | Facility: CLINIC | Age: 42
End: 2017-12-15
Payer: COMMERCIAL

## 2017-12-15 VITALS
OXYGEN SATURATION: 100 % | HEIGHT: 65 IN | BODY MASS INDEX: 31.48 KG/M2 | SYSTOLIC BLOOD PRESSURE: 115 MMHG | WEIGHT: 188.94 LBS | TEMPERATURE: 98 F | DIASTOLIC BLOOD PRESSURE: 71 MMHG | HEART RATE: 78 BPM

## 2017-12-15 DIAGNOSIS — J30.89 CHRONIC NON-SEASONAL ALLERGIC RHINITIS, UNSPECIFIED TRIGGER: Primary | ICD-10-CM

## 2017-12-15 PROCEDURE — 99213 OFFICE O/P EST LOW 20 MIN: CPT | Mod: S$GLB,,,

## 2017-12-15 PROCEDURE — 99999 PR PBB SHADOW E&M-EST. PATIENT-LVL IV: CPT | Mod: PBBFAC,,,

## 2017-12-15 RX ORDER — CYCLOSPORINE 0.5 MG/ML
EMULSION OPHTHALMIC
COMMUNITY
Start: 2017-11-21 | End: 2018-10-22

## 2017-12-15 RX ORDER — ZAFIRLUKAST 20 MG/1
20 TABLET, FILM COATED ORAL 2 TIMES DAILY
Qty: 60 TABLET | Refills: 2 | Status: SHIPPED | OUTPATIENT
Start: 2017-12-15 | End: 2018-03-15

## 2017-12-15 NOTE — PROGRESS NOTES
Subjective:       Patient ID: Josh Simental is a 42 y.o. female.    Chief Complaint: Sinus Problem    HPI   Patient presents with a two-month complaint of nasal congestion, rhinorrhea, and postnasal drip.  She says her symptoms are constant and mild to moderate in intensity.  She denies voices associated watery eyes.  She denies any shortness of breath cough, or wheezing.  The patient states that she take Zyrtec on a daily basis has been doing so for 3-4 weeks.  She voices taking Flonase sporadically.  She cannot identify exacerbating or mitigating factors.    Review of Systems   Constitutional: Negative for activity change, appetite change, fatigue, fever and unexpected weight change.   HENT: Positive for congestion, postnasal drip and rhinorrhea.    Eyes: Negative.    Respiratory: Negative for cough, chest tightness, shortness of breath and wheezing.    Cardiovascular: Negative for chest pain, palpitations and leg swelling.   Gastrointestinal: Negative for constipation, diarrhea, nausea and vomiting.   Endocrine: Negative.    Genitourinary: Negative.    Musculoskeletal: Negative.    Skin: Negative for color change.   Allergic/Immunologic: Negative.    Neurological: Negative for dizziness, weakness and light-headedness.   Hematological: Negative.    Psychiatric/Behavioral: Negative for sleep disturbance.         Objective:      Physical Exam   Constitutional: She is oriented to person, place, and time. She appears well-developed and well-nourished. No distress.   HENT:   Head: Normocephalic and atraumatic. Hair is normal.   Right Ear: Hearing, tympanic membrane, external ear and ear canal normal.   Left Ear: Hearing, tympanic membrane, external ear and ear canal normal.   Nose: No mucosal edema, rhinorrhea, nose lacerations, sinus tenderness, nasal deformity, septal deviation or nasal septal hematoma. No epistaxis.  No foreign bodies. Right sinus exhibits no maxillary sinus tenderness and no frontal sinus  tenderness. Left sinus exhibits no maxillary sinus tenderness and no frontal sinus tenderness.   Mouth/Throat: Uvula is midline, oropharynx is clear and moist and mucous membranes are normal.   Eyes: Conjunctivae are normal. Pupils are equal, round, and reactive to light. Right eye exhibits no discharge. Left eye exhibits no discharge.   Neck: Trachea normal, normal range of motion and phonation normal. Neck supple. No tracheal deviation present.   Cardiovascular: Normal rate, regular rhythm, normal heart sounds and intact distal pulses.  Exam reveals no gallop and no friction rub.    No murmur heard.  Pulmonary/Chest: Effort normal and breath sounds normal. No respiratory distress. She has no decreased breath sounds. She has no wheezes. She has no rhonchi. She has no rales. She exhibits no tenderness.   Musculoskeletal: Normal range of motion.   Lymphadenopathy:        Head (right side): No submental, no submandibular, no tonsillar, no preauricular, no posterior auricular and no occipital adenopathy present.        Head (left side): No submental, no submandibular, no tonsillar, no preauricular, no posterior auricular and no occipital adenopathy present.     She has no cervical adenopathy.        Right cervical: No superficial cervical, no deep cervical and no posterior cervical adenopathy present.       Left cervical: No superficial cervical, no deep cervical and no posterior cervical adenopathy present.   Neurological: She is alert and oriented to person, place, and time. She exhibits normal muscle tone. GCS eye subscore is 4. GCS verbal subscore is 5. GCS motor subscore is 6.   Skin: Skin is warm and dry. No rash noted. She is not diaphoretic. No erythema. No pallor.   Psychiatric: She has a normal mood and affect. Her speech is normal and behavior is normal. Judgment and thought content normal.       Assessment:       1. Chronic non-seasonal allergic rhinitis, unspecified trigger          Plan:   Chronic  non-seasonal allergic rhinitis, unspecified trigger  -     zafirlukast (ACCOLATE) 20 MG tablet; Take 1 tablet (20 mg total) by mouth 2 (two) times daily.  Dispense: 60 tablet; Refill: 2            Disclaimer: This note is prepared using voice recognition software.

## 2018-03-27 ENCOUNTER — OFFICE VISIT (OUTPATIENT)
Dept: PODIATRY | Facility: CLINIC | Age: 43
End: 2018-03-27
Payer: COMMERCIAL

## 2018-03-27 VITALS
DIASTOLIC BLOOD PRESSURE: 72 MMHG | HEART RATE: 68 BPM | HEIGHT: 65 IN | WEIGHT: 195.31 LBS | SYSTOLIC BLOOD PRESSURE: 101 MMHG | BODY MASS INDEX: 32.54 KG/M2

## 2018-03-27 DIAGNOSIS — M72.2 PLANTAR FASCIAL FIBROMATOSIS OF RIGHT FOOT: Primary | ICD-10-CM

## 2018-03-27 PROCEDURE — 99203 OFFICE O/P NEW LOW 30 MIN: CPT | Mod: S$GLB,,, | Performed by: PODIATRIST

## 2018-03-27 PROCEDURE — 99999 PR PBB SHADOW E&M-EST. PATIENT-LVL III: CPT | Mod: PBBFAC,,, | Performed by: PODIATRIST

## 2018-03-27 NOTE — PROGRESS NOTES
"Subjective:     Patient ID: Josh Simental is a 43 y.o. female.    Chief Complaint: Foot Pain (pain in the arch and the medial side of the right foot, patient stated she had "broken toes in her right foot due to a past injury" )    Josh is a 43 y.o. female who presents to the podiatry clinic  with complaint of  right foot pain. Onset of the symptoms was several weeks ago. Precipitating event: none known. Current symptoms include: ability to bear weight, but with some pain. Aggravating factors: any weight bearing. Symptoms have gradually worsened. Patient has had prior foot problems. Evaluation to date: none. Treatment to date: none. Patients rates pain 2/10 on pain scale. Patient states she did have to remove her inserts because they push on the knot.     Patient Active Problem List   Diagnosis    Migraines    Depression       Medication List with Changes/Refills   Current Medications    ASCORBIC ACID/VITAMIN E/BIOTIN (HAIR, SKIN, NAILS WITH BIOTIN ORAL)    Take by mouth.    LACTOBACILLUS ACIDOPHILUS (PROBIOTIC ORAL)    Take 1 tablet by mouth once daily.    RESTASIS 0.05 % OPHTHALMIC EMULSION           Review of patient's allergies indicates:   Allergen Reactions    No known drug allergies        Past Surgical History:   Procedure Laterality Date    CHOLECYSTECTOMY  07/2017    EYE SURGERY      GALLBLADDER SURGERY  07/2017    TUBAL LIGATION         Family History   Problem Relation Age of Onset    Diabetes Mother     Thyroid disease Mother     Diabetes Father     Cancer Father     Cancer Maternal Grandmother     Diabetes Maternal Grandfather     Cancer Maternal Grandfather     Stroke Paternal Grandmother        Social History     Social History    Marital status:      Spouse name: N/A    Number of children: N/A    Years of education: N/A     Occupational History    Not on file.     Social History Main Topics    Smoking status: Former Smoker     Packs/day: 0.25     Years: 2.00     " "Types: Cigarettes     Quit date: 6/17/1999    Smokeless tobacco: Never Used      Comment: maybe 1 pk a week     Alcohol use No    Drug use: No    Sexual activity: Yes     Partners: Male     Birth control/ protection: None     Other Topics Concern    Not on file     Social History Narrative    No narrative on file       Vitals:    03/27/18 1008   BP: 101/72   Pulse: 68   Weight: 88.6 kg (195 lb 5.2 oz)   Height: 5' 5" (1.651 m)   PainSc:   2       Hemoglobin A1C   Date Value Ref Range Status   10/15/2015 5.2 4.5 - 6.2 % Final   10/15/2014 5.2 4.5 - 6.2 % Final   11/30/2012 5.4 4.0 - 6.2 % Final       Review of Systems   Constitutional: Negative for chills and fever.   Respiratory: Negative for shortness of breath.    Cardiovascular: Negative for chest pain, palpitations, orthopnea, claudication and leg swelling.   Gastrointestinal: Negative for diarrhea, nausea and vomiting.   Musculoskeletal: Negative for joint pain.   Skin: Negative for rash.   Neurological: Negative for dizziness, tingling, sensory change, focal weakness and weakness.   Psychiatric/Behavioral: Negative.            Objective:       PHYSICAL EXAM: Apperance: Alert and orient in no distress,well developed, and with good attention to grooming and body habits  Patient presents ambulating in flip flops.  Lower Extremity Physical Exam:  VASCULAR: Dorsalis pedis pulses 2/4 bilateral and Posterior Tibial pulses 2/4 bilateral. Capillary fill time <4 seconds bilateral. No edema observed bilateral. Varicosities absent bilateral. Skin temperature of the lower extremities is warm to warm, proximal to distal. Hair growth WNL bilateral.  DERMATOLOGICAL: No skin rashes, lesions, or ulcers observed bilateral. Firm subcutaneous nodule noted to right plantar foot.   NEUROLOGICAL: Light touch, sharp-dull, proprioception all present and equal bilaterally.    MUSCULOSKELETAL: Muscle strength is 5/5 for foot inverters, everters, plantarflexors, and dorsiflexors. " Muscle tone is normal. (+) pain on palpation of subcutaneous nodule of right plantar foot at the medial band.         Assessment:       Encounter Diagnosis   Name Primary?    Plantar fascial fibromatosis of right foot Yes         Plan:   Plantar fascial fibromatosis of right foot      I counseled the patient on her conditions, regarding findings of my examination, my impressions, and usual treatment plan.   The patient and I reviewed the types of shoes she should be wearing, my recommendation includes generally the best time of the day for a shoe fitting is the afternoon, shoes with a wide toe box, very good cushion, and tennis shoes with removable inner soles.The patient and I reviewed my recommendations for over-the-counter orthotic inserts.   Prescribed topical compound (plantar fibroma cream)consisting of Verapamil, Lidocaine, and Prilocaine. Patient instructed to apply to area up to five times daily. Discussed with patient that there may be out of pocket cost of cream pending insurance, and that they may refuse medication if cost is an issue. Patient states they understand.   Patient to return in 6 weeks.               Puma Wood DPM  [facility]

## 2018-05-08 ENCOUNTER — OFFICE VISIT (OUTPATIENT)
Dept: PODIATRY | Facility: CLINIC | Age: 43
End: 2018-05-08
Payer: COMMERCIAL

## 2018-05-08 VITALS
WEIGHT: 193.88 LBS | HEART RATE: 74 BPM | SYSTOLIC BLOOD PRESSURE: 101 MMHG | BODY MASS INDEX: 32.3 KG/M2 | DIASTOLIC BLOOD PRESSURE: 70 MMHG | HEIGHT: 65 IN

## 2018-05-08 DIAGNOSIS — M72.2 PLANTAR FASCIAL FIBROMATOSIS OF RIGHT FOOT: Primary | ICD-10-CM

## 2018-05-08 PROCEDURE — 99213 OFFICE O/P EST LOW 20 MIN: CPT | Mod: S$GLB,,, | Performed by: PODIATRIST

## 2018-05-08 PROCEDURE — 3008F BODY MASS INDEX DOCD: CPT | Mod: CPTII,S$GLB,, | Performed by: PODIATRIST

## 2018-05-08 PROCEDURE — 99999 PR PBB SHADOW E&M-EST. PATIENT-LVL III: CPT | Mod: PBBFAC,,, | Performed by: PODIATRIST

## 2018-05-08 RX ORDER — CETIRIZINE HYDROCHLORIDE 10 MG/1
10 TABLET ORAL DAILY
COMMUNITY
End: 2018-10-22

## 2018-05-17 NOTE — PROGRESS NOTES
Subjective:     Patient ID: Josh Simental is a 43 y.o. female.    Chief Complaint: Follow-up (F/U right foot pain. Continues with pain 3/10 at worst. Applying prescribed topical with some relief and performing stretching exercises)    Josh is a 43 y.o. female who presents to the podiatry clinic  with complaint of  right foot pain. Patient states the knot is better with the topical cream and stretching exercises. Patients rates pain 3/10 on pain scale. Patient has no other pedal complaints at this time.       Patient Active Problem List   Diagnosis    Migraines    Depression       Medication List with Changes/Refills   Current Medications    ASCORBIC ACID/VITAMIN E/BIOTIN (HAIR, SKIN, NAILS WITH BIOTIN ORAL)    Take by mouth.    CETIRIZINE (ZYRTEC) 10 MG TABLET    Take 10 mg by mouth once daily.    LACTOBACILLUS ACIDOPHILUS (PROBIOTIC ORAL)    Take 1 tablet by mouth once daily.    RESTASIS 0.05 % OPHTHALMIC EMULSION           Review of patient's allergies indicates:   Allergen Reactions    No known drug allergies        Past Surgical History:   Procedure Laterality Date    CHOLECYSTECTOMY  07/2017    EYE SURGERY      GALLBLADDER SURGERY  07/2017    TUBAL LIGATION         Family History   Problem Relation Age of Onset    Diabetes Mother     Thyroid disease Mother     Diabetes Father     Cancer Father     Cancer Maternal Grandmother     Diabetes Maternal Grandfather     Cancer Maternal Grandfather     Stroke Paternal Grandmother        Social History     Social History    Marital status:      Spouse name: N/A    Number of children: N/A    Years of education: N/A     Occupational History    Not on file.     Social History Main Topics    Smoking status: Former Smoker     Packs/day: 0.25     Years: 2.00     Types: Cigarettes     Quit date: 6/17/1999    Smokeless tobacco: Never Used      Comment: maybe 1 pk a week     Alcohol use No    Drug use: No    Sexual activity: Yes      "Partners: Male     Birth control/ protection: None     Other Topics Concern    Not on file     Social History Narrative    No narrative on file       Vitals:    05/08/18 1307   BP: 101/70   Pulse: 74   Weight: 87.9 kg (193 lb 14.3 oz)   Height: 5' 5" (1.651 m)   PainSc:   1   PainLoc: Foot       Hemoglobin A1C   Date Value Ref Range Status   10/15/2015 5.2 4.5 - 6.2 % Final   10/15/2014 5.2 4.5 - 6.2 % Final   11/30/2012 5.4 4.0 - 6.2 % Final       Review of Systems   Constitutional: Negative for chills and fever.   Respiratory: Negative for shortness of breath.    Cardiovascular: Negative for chest pain, palpitations, orthopnea, claudication and leg swelling.   Gastrointestinal: Negative for diarrhea, nausea and vomiting.   Musculoskeletal: Negative for joint pain.   Skin: Negative for rash.   Neurological: Negative for dizziness, tingling, sensory change, focal weakness and weakness.   Psychiatric/Behavioral: Negative.            Objective:       PHYSICAL EXAM: Apperance: Alert and orient in no distress,well developed, and with good attention to grooming and body habits  Patient presents ambulating in flip flops.  Lower Extremity Physical Exam:  VASCULAR: Dorsalis pedis pulses 2/4 bilateral and Posterior Tibial pulses 2/4 bilateral. Capillary fill time <4 seconds bilateral. No edema observed bilateral. Varicosities absent bilateral. Skin temperature of the lower extremities is warm to warm, proximal to distal. Hair growth WNL bilateral.  DERMATOLOGICAL: No skin rashes, lesions, or ulcers observed bilateral. Firm subcutaneous nodule noted to right plantar foot.   NEUROLOGICAL: Light touch, sharp-dull, proprioception all present and equal bilaterally.    MUSCULOSKELETAL: Muscle strength is 5/5 for foot inverters, everters, plantarflexors, and dorsiflexors. Muscle tone is normal. (+) pain on palpation of subcutaneous nodule of right plantar foot at the medial band.         Assessment:       Encounter Diagnosis "   Name Primary?    Plantar fascial fibromatosis of right foot Yes         Plan:   Plantar fascial fibromatosis of right foot      I counseled the patient on her conditions, regarding findings of my examination, my impressions, and usual treatment plan.   The patient and I reviewed the types of shoes she should be wearing, my recommendation includes generally the best time of the day for a shoe fitting is the afternoon, shoes with a wide toe box, very good cushion, and tennis shoes with removable inner soles.The patient and I reviewed my recommendations for over-the-counter orthotic inserts.   Patient to continue topical compound (plantar fibroma cream)consisting of Verapamil, Lidocaine, and Prilocaine. Patient instructed to apply to area up to five times daily.   Patient to return if symptoms worsen or for surgical  Intervention.                Puma Wood DPM  [facility]

## 2018-06-22 DIAGNOSIS — Z12.39 BREAST CANCER SCREENING: ICD-10-CM

## 2018-08-15 ENCOUNTER — OFFICE VISIT (OUTPATIENT)
Dept: FAMILY MEDICINE | Facility: CLINIC | Age: 43
End: 2018-08-15
Payer: COMMERCIAL

## 2018-08-15 VITALS
WEIGHT: 194.88 LBS | SYSTOLIC BLOOD PRESSURE: 104 MMHG | HEART RATE: 75 BPM | TEMPERATURE: 99 F | HEIGHT: 65 IN | DIASTOLIC BLOOD PRESSURE: 76 MMHG | BODY MASS INDEX: 32.47 KG/M2 | OXYGEN SATURATION: 98 %

## 2018-08-15 DIAGNOSIS — R10.9 ABDOMINAL PAIN, UNSPECIFIED ABDOMINAL LOCATION: Primary | ICD-10-CM

## 2018-08-15 DIAGNOSIS — Z83.3 FAMILY HISTORY OF DIET-CONTROLLED DIABETES: ICD-10-CM

## 2018-08-15 DIAGNOSIS — R63.5 WEIGHT GAIN: ICD-10-CM

## 2018-08-15 DIAGNOSIS — K64.8 INTERNAL HEMORRHOID: ICD-10-CM

## 2018-08-15 LAB
B-HCG UR QL: NEGATIVE
BACTERIA #/AREA URNS AUTO: NORMAL /HPF
BILIRUB UR QL STRIP: NEGATIVE
CLARITY UR REFRACT.AUTO: CLEAR
COLOR UR AUTO: ABNORMAL
CTP QC/QA: YES
GLUCOSE UR QL STRIP: NEGATIVE
HGB UR QL STRIP: ABNORMAL
KETONES UR QL STRIP: NEGATIVE
LEUKOCYTE ESTERASE UR QL STRIP: ABNORMAL
MICROSCOPIC COMMENT: NORMAL
NITRITE UR QL STRIP: NEGATIVE
PH UR STRIP: 6 [PH] (ref 5–8)
PROT UR QL STRIP: NEGATIVE
RBC #/AREA URNS AUTO: 1 /HPF (ref 0–4)
SP GR UR STRIP: 1 (ref 1–1.03)
SQUAMOUS #/AREA URNS AUTO: 1 /HPF
URN SPEC COLLECT METH UR: ABNORMAL
UROBILINOGEN UR STRIP-ACNC: NEGATIVE EU/DL
WBC #/AREA URNS AUTO: 2 /HPF (ref 0–5)

## 2018-08-15 PROCEDURE — 81001 URINALYSIS AUTO W/SCOPE: CPT

## 2018-08-15 PROCEDURE — 99999 PR PBB SHADOW E&M-EST. PATIENT-LVL III: CPT | Mod: PBBFAC,,, | Performed by: FAMILY MEDICINE

## 2018-08-15 PROCEDURE — 81025 URINE PREGNANCY TEST: CPT | Mod: S$GLB,,, | Performed by: FAMILY MEDICINE

## 2018-08-15 PROCEDURE — 3008F BODY MASS INDEX DOCD: CPT | Mod: CPTII,S$GLB,, | Performed by: FAMILY MEDICINE

## 2018-08-15 PROCEDURE — 99214 OFFICE O/P EST MOD 30 MIN: CPT | Mod: S$GLB,,, | Performed by: FAMILY MEDICINE

## 2018-08-15 RX ORDER — ESOMEPRAZOLE MAGNESIUM 40 MG/1
40 CAPSULE, DELAYED RELEASE ORAL
Qty: 30 CAPSULE | Refills: 0 | Status: SHIPPED | OUTPATIENT
Start: 2018-08-15 | End: 2018-10-22

## 2018-08-15 RX ORDER — HYDROCORTISONE ACETATE 25 MG/1
25 SUPPOSITORY RECTAL 2 TIMES DAILY
Qty: 20 SUPPOSITORY | Refills: 0 | Status: SHIPPED | OUTPATIENT
Start: 2018-08-15 | End: 2018-08-17

## 2018-08-15 NOTE — PROGRESS NOTES
Subjective:       Patient ID: Josh Simental is a 43 y.o. female.    Chief Complaint: Abdominal Pain; Back Pain (patient states she is having lower back pain ); Rectal Bleeding; Numbness (patient complains of numbness in fingertips when she wakes up in the morning.); Weight Gain; and Fatigue (patient states she gets really tired every afternoon.)    43 y old female with nagging epigastric abdominal pain over the last 2 weeks after having Ice cream  . Worst with any food . Improved by now since she stopped taking Probiotics . She takes NSAID almost daily for headaches. No dysuria . Normal BM . Also concerned about noticing  BRBPR after 1 BM last week. No rectal pain , no hx of hemorrhoids       Abdominal Pain   This is a new problem. The current episode started in the past 7 days. The onset quality is sudden. The problem occurs constantly. The most recent episode lasted 4 hours. The problem has been unchanged. The pain is located in the RUQ. The pain is at a severity of 3/10. The pain is mild. The quality of the pain is aching. Associated symptoms include anorexia, belching, flatus, headaches, hematochezia and melena. Pertinent negatives include no arthralgias, constipation, diarrhea, dysuria, fever, frequency, hematuria, myalgias, nausea, vomiting or weight loss. The pain is aggravated by eating. The pain is relieved by nothing. She has tried acetaminophen and antacids for the symptoms. The treatment provided no relief. Her past medical history is significant for gallstones.     Review of Systems   Constitutional: Negative.  Negative for fever and weight loss.   HENT: Negative.    Eyes: Negative.    Respiratory: Negative.    Cardiovascular: Negative.    Gastrointestinal: Positive for abdominal pain, anorexia, blood in stool, flatus, hematochezia and melena. Negative for constipation, diarrhea, nausea and vomiting.   Genitourinary: Negative.  Negative for dysuria, frequency and hematuria.   Musculoskeletal:  Negative.  Negative for arthralgias and myalgias.   Skin: Negative.    Neurological: Positive for headaches.   Hematological: Negative.        Objective:      Physical Exam   Constitutional: She is oriented to person, place, and time. No distress.   HENT:   Head: Normocephalic and atraumatic.   Right Ear: External ear normal.   Left Ear: External ear normal.   Mouth/Throat: No oropharyngeal exudate.   Eyes: Conjunctivae and EOM are normal. Pupils are equal, round, and reactive to light. Right eye exhibits no discharge. Left eye exhibits no discharge. No scleral icterus.   Neck: Normal range of motion. Neck supple. No JVD present. No tracheal deviation present. No thyromegaly present.   Cardiovascular: Normal rate, regular rhythm and normal heart sounds. Exam reveals no gallop and no friction rub.   No murmur heard.  Pulmonary/Chest: Effort normal and breath sounds normal. No stridor. No respiratory distress. She has no wheezes. She has no rales. She exhibits no tenderness.   Abdominal: Soft. Bowel sounds are normal. She exhibits no distension. There is tenderness in the epigastric area. There is no rebound and no guarding.   Genitourinary: Rectal exam shows external hemorrhoid and internal hemorrhoid.   Musculoskeletal: Normal range of motion.   Lymphadenopathy:     She has no cervical adenopathy.   Neurological: She is alert and oriented to person, place, and time.   Skin: Skin is warm and dry. She is not diaphoretic.   Psychiatric: She has a normal mood and affect. Her behavior is normal. Judgment and thought content normal.       Assessment:       Josh was seen today for abdominal pain, back pain, rectal bleeding, numbness, weight gain and fatigue.    Diagnoses and all orders for this visit:    Abdominal pain, unspecified abdominal location  -     H.Pylori Antibody IgG; Future  -     POCT Urine Pregnancy  -     Urinalysis  -     CBC auto differential; Future  -     Comprehensive metabolic panel; Future    Weight  gain  -     TSH; Future    Family history of diet-controlled diabetes  -     Lipid panel; Future  -     Hemoglobin A1c; Future    Internal hemorrhoid    Other orders  -     esomeprazole (NEXIUM) 40 MG capsule; Take 1 capsule (40 mg total) by mouth before breakfast.  -     hydrocortisone (ANUSOL-HC) 25 mg suppository; Place 1 suppository (25 mg total) rectally 2 (two) times daily. for 10 days      Plan:   Labs   EGD in 2 w if symptoms fail  to improved   Anusol

## 2018-08-16 ENCOUNTER — LAB VISIT (OUTPATIENT)
Dept: LAB | Facility: HOSPITAL | Age: 43
End: 2018-08-16
Attending: FAMILY MEDICINE
Payer: COMMERCIAL

## 2018-08-16 DIAGNOSIS — Z83.3 FAMILY HISTORY OF DIET-CONTROLLED DIABETES: ICD-10-CM

## 2018-08-16 DIAGNOSIS — R63.5 WEIGHT GAIN: ICD-10-CM

## 2018-08-16 DIAGNOSIS — R10.9 ABDOMINAL PAIN, UNSPECIFIED ABDOMINAL LOCATION: ICD-10-CM

## 2018-08-16 LAB
ALBUMIN SERPL BCP-MCNC: 3.8 G/DL
ALP SERPL-CCNC: 75 U/L
ALT SERPL W/O P-5'-P-CCNC: 22 U/L
ANION GAP SERPL CALC-SCNC: 8 MMOL/L
AST SERPL-CCNC: 21 U/L
BASOPHILS # BLD AUTO: 0.06 K/UL
BASOPHILS NFR BLD: 1.4 %
BILIRUB SERPL-MCNC: 0.4 MG/DL
BUN SERPL-MCNC: 9 MG/DL
CALCIUM SERPL-MCNC: 9.6 MG/DL
CHLORIDE SERPL-SCNC: 109 MMOL/L
CHOLEST SERPL-MCNC: 196 MG/DL
CHOLEST/HDLC SERPL: 3.2 {RATIO}
CO2 SERPL-SCNC: 24 MMOL/L
CREAT SERPL-MCNC: 0.8 MG/DL
DIFFERENTIAL METHOD: ABNORMAL
EOSINOPHIL # BLD AUTO: 0.1 K/UL
EOSINOPHIL NFR BLD: 2.7 %
ERYTHROCYTE [DISTWIDTH] IN BLOOD BY AUTOMATED COUNT: 12 %
EST. GFR  (AFRICAN AMERICAN): >60 ML/MIN/1.73 M^2
EST. GFR  (NON AFRICAN AMERICAN): >60 ML/MIN/1.73 M^2
GLUCOSE SERPL-MCNC: 91 MG/DL
HCT VFR BLD AUTO: 40.6 %
HDLC SERPL-MCNC: 61 MG/DL
HDLC SERPL: 31.1 %
HGB BLD-MCNC: 12.5 G/DL
IMM GRANULOCYTES # BLD AUTO: 0.01 K/UL
IMM GRANULOCYTES NFR BLD AUTO: 0.2 %
LDLC SERPL CALC-MCNC: 113.6 MG/DL
LYMPHOCYTES # BLD AUTO: 1.6 K/UL
LYMPHOCYTES NFR BLD: 35.7 %
MCH RBC QN AUTO: 28.7 PG
MCHC RBC AUTO-ENTMCNC: 30.8 G/DL
MCV RBC AUTO: 93 FL
MONOCYTES # BLD AUTO: 0.3 K/UL
MONOCYTES NFR BLD: 6.1 %
NEUTROPHILS # BLD AUTO: 2.4 K/UL
NEUTROPHILS NFR BLD: 53.9 %
NONHDLC SERPL-MCNC: 135 MG/DL
NRBC BLD-RTO: 0 /100 WBC
PLATELET # BLD AUTO: 245 K/UL
PMV BLD AUTO: 11 FL
POTASSIUM SERPL-SCNC: 4.6 MMOL/L
PROT SERPL-MCNC: 7.1 G/DL
RBC # BLD AUTO: 4.35 M/UL
SODIUM SERPL-SCNC: 141 MMOL/L
TRIGL SERPL-MCNC: 107 MG/DL
TSH SERPL DL<=0.005 MIU/L-ACNC: 1.3 UIU/ML
WBC # BLD AUTO: 4.43 K/UL

## 2018-08-16 PROCEDURE — 84443 ASSAY THYROID STIM HORMONE: CPT

## 2018-08-16 PROCEDURE — 83036 HEMOGLOBIN GLYCOSYLATED A1C: CPT

## 2018-08-16 PROCEDURE — 80053 COMPREHEN METABOLIC PANEL: CPT

## 2018-08-16 PROCEDURE — 86677 HELICOBACTER PYLORI ANTIBODY: CPT

## 2018-08-16 PROCEDURE — 36415 COLL VENOUS BLD VENIPUNCTURE: CPT | Mod: PO

## 2018-08-16 PROCEDURE — 85025 COMPLETE CBC W/AUTO DIFF WBC: CPT

## 2018-08-16 PROCEDURE — 80061 LIPID PANEL: CPT

## 2018-08-17 ENCOUNTER — PATIENT MESSAGE (OUTPATIENT)
Dept: FAMILY MEDICINE | Facility: CLINIC | Age: 43
End: 2018-08-17

## 2018-08-17 ENCOUNTER — LAB VISIT (OUTPATIENT)
Dept: LAB | Facility: HOSPITAL | Age: 43
End: 2018-08-17
Attending: FAMILY MEDICINE
Payer: COMMERCIAL

## 2018-08-17 ENCOUNTER — TELEPHONE (OUTPATIENT)
Dept: FAMILY MEDICINE | Facility: CLINIC | Age: 43
End: 2018-08-17

## 2018-08-17 DIAGNOSIS — R31.9 HEMATURIA, UNSPECIFIED TYPE: ICD-10-CM

## 2018-08-17 DIAGNOSIS — R31.9 HEMATURIA, UNSPECIFIED TYPE: Primary | ICD-10-CM

## 2018-08-17 LAB
ESTIMATED AVG GLUCOSE: 100 MG/DL
HBA1C MFR BLD HPLC: 5.1 %

## 2018-08-17 PROCEDURE — 87086 URINE CULTURE/COLONY COUNT: CPT

## 2018-08-17 RX ORDER — HYDROCORTISONE 25 MG/G
CREAM TOPICAL 2 TIMES DAILY
Qty: 30 G | Refills: 0 | Status: SHIPPED | OUTPATIENT
Start: 2018-08-17 | End: 2018-08-27

## 2018-08-17 NOTE — TELEPHONE ENCOUNTER
----- Message from Evy Luna sent at 8/17/2018 10:06 AM CDT -----  Contact: self   Patient returning call. Please call back at [phone].      Thanks,  Evy Luna

## 2018-08-19 LAB — BACTERIA UR CULT: NO GROWTH

## 2018-08-20 ENCOUNTER — TELEPHONE (OUTPATIENT)
Dept: FAMILY MEDICINE | Facility: CLINIC | Age: 43
End: 2018-08-20

## 2018-08-20 ENCOUNTER — PATIENT MESSAGE (OUTPATIENT)
Dept: FAMILY MEDICINE | Facility: CLINIC | Age: 43
End: 2018-08-20

## 2018-08-20 DIAGNOSIS — R31.9 HEMATURIA, UNSPECIFIED TYPE: Primary | ICD-10-CM

## 2018-08-20 LAB — H PYLORI IGG SERPL QL IA: NEGATIVE

## 2018-08-20 RX ORDER — SULFAMETHOXAZOLE AND TRIMETHOPRIM 800; 160 MG/1; MG/1
1 TABLET ORAL 2 TIMES DAILY
Qty: 20 TABLET | Refills: 0 | Status: SHIPPED | OUTPATIENT
Start: 2018-08-20 | End: 2018-10-22

## 2018-09-07 ENCOUNTER — PATIENT MESSAGE (OUTPATIENT)
Dept: FAMILY MEDICINE | Facility: CLINIC | Age: 43
End: 2018-09-07

## 2018-09-10 ENCOUNTER — TELEPHONE (OUTPATIENT)
Dept: FAMILY MEDICINE | Facility: CLINIC | Age: 43
End: 2018-09-10

## 2018-09-10 DIAGNOSIS — R13.10 DYSPHAGIA, UNSPECIFIED TYPE: Primary | ICD-10-CM

## 2018-09-11 ENCOUNTER — LAB VISIT (OUTPATIENT)
Dept: LAB | Facility: HOSPITAL | Age: 43
End: 2018-09-11
Attending: FAMILY MEDICINE
Payer: COMMERCIAL

## 2018-09-11 DIAGNOSIS — R31.9 HEMATURIA, UNSPECIFIED TYPE: ICD-10-CM

## 2018-09-11 LAB
BACTERIA #/AREA URNS AUTO: ABNORMAL /HPF
MICROSCOPIC COMMENT: ABNORMAL
RBC #/AREA URNS AUTO: 11 /HPF (ref 0–4)
SQUAMOUS #/AREA URNS AUTO: 1 /HPF
WBC #/AREA URNS AUTO: 2 /HPF (ref 0–5)

## 2018-09-11 PROCEDURE — 81001 URINALYSIS AUTO W/SCOPE: CPT

## 2018-09-12 ENCOUNTER — PATIENT MESSAGE (OUTPATIENT)
Dept: FAMILY MEDICINE | Facility: CLINIC | Age: 43
End: 2018-09-12

## 2018-09-14 ENCOUNTER — PATIENT MESSAGE (OUTPATIENT)
Dept: FAMILY MEDICINE | Facility: CLINIC | Age: 43
End: 2018-09-14

## 2018-09-14 ENCOUNTER — TELEPHONE (OUTPATIENT)
Dept: FAMILY MEDICINE | Facility: CLINIC | Age: 43
End: 2018-09-14

## 2018-09-14 DIAGNOSIS — R31.9 HEMATURIA, UNSPECIFIED TYPE: Primary | ICD-10-CM

## 2018-09-28 ENCOUNTER — DOCUMENTATION ONLY (OUTPATIENT)
Dept: ENDOSCOPY | Facility: HOSPITAL | Age: 43
End: 2018-09-28

## 2018-10-22 ENCOUNTER — OFFICE VISIT (OUTPATIENT)
Dept: FAMILY MEDICINE | Facility: CLINIC | Age: 43
End: 2018-10-22
Payer: COMMERCIAL

## 2018-10-22 VITALS
OXYGEN SATURATION: 99 % | SYSTOLIC BLOOD PRESSURE: 114 MMHG | BODY MASS INDEX: 32.87 KG/M2 | DIASTOLIC BLOOD PRESSURE: 76 MMHG | WEIGHT: 197.31 LBS | HEIGHT: 65 IN | TEMPERATURE: 98 F | HEART RATE: 77 BPM

## 2018-10-22 DIAGNOSIS — J01.00 ACUTE NON-RECURRENT MAXILLARY SINUSITIS: Primary | ICD-10-CM

## 2018-10-22 DIAGNOSIS — J30.89 SEASONAL ALLERGIC RHINITIS DUE TO OTHER ALLERGIC TRIGGER: ICD-10-CM

## 2018-10-22 DIAGNOSIS — B00.1 HERPES LABIALIS: ICD-10-CM

## 2018-10-22 PROBLEM — J30.9 ALLERGIC RHINITIS: Status: ACTIVE | Noted: 2018-10-22

## 2018-10-22 PROCEDURE — 99213 OFFICE O/P EST LOW 20 MIN: CPT | Mod: S$GLB,,, | Performed by: FAMILY MEDICINE

## 2018-10-22 PROCEDURE — 3008F BODY MASS INDEX DOCD: CPT | Mod: CPTII,S$GLB,, | Performed by: FAMILY MEDICINE

## 2018-10-22 PROCEDURE — 99999 PR PBB SHADOW E&M-EST. PATIENT-LVL III: CPT | Mod: PBBFAC,,, | Performed by: FAMILY MEDICINE

## 2018-10-22 RX ORDER — ACYCLOVIR 400 MG/1
TABLET ORAL
Qty: 25 TABLET | Refills: 0 | Status: SHIPPED | OUTPATIENT
Start: 2018-10-22 | End: 2018-11-26 | Stop reason: SDUPTHER

## 2018-10-22 RX ORDER — ZAFIRLUKAST 20 MG/1
1 TABLET, FILM COATED ORAL 2 TIMES DAILY
Refills: 2 | COMMUNITY
Start: 2018-10-08 | End: 2019-05-27

## 2018-10-22 RX ORDER — PREDNISONE 20 MG/1
TABLET ORAL
Qty: 20 TABLET | Refills: 0 | Status: SHIPPED | OUTPATIENT
Start: 2018-10-22 | End: 2019-05-27

## 2018-10-22 RX ORDER — AMOXICILLIN AND CLAVULANATE POTASSIUM 875; 125 MG/1; MG/1
1 TABLET, FILM COATED ORAL EVERY 12 HOURS
Qty: 20 TABLET | Refills: 0 | Status: SHIPPED | OUTPATIENT
Start: 2018-10-22 | End: 2019-05-27

## 2018-10-22 NOTE — PROGRESS NOTES
Subjective:       Patient ID: Josh Simental is a 43 y.o. female.    Chief Complaint: Sinus Problem (sinus congestion, post nasal drip, productive cough)    43 y old female with  green rhinorrhea and nasal congestion for 1 m . Taking Mucinex and  Accolade. + non productive cough ,no sob . No fever .       Cough   This is a new problem. The current episode started 1 to 4 weeks ago. The problem has been gradually improving. The problem occurs every few hours. The cough is productive of sputum. Associated symptoms include ear congestion, a fever, headaches, myalgias, nasal congestion and postnasal drip. Pertinent negatives include no chest pain, chills, ear pain, heartburn, hemoptysis, rash, rhinorrhea, sore throat, shortness of breath, sweats, weight loss or wheezing. Nothing aggravates the symptoms. She has tried OTC cough suppressant and body position changes for the symptoms. The treatment provided moderate relief. Her past medical history is significant for bronchitis and environmental allergies. There is no history of asthma, bronchiectasis, COPD, emphysema or pneumonia.     Review of Systems   Constitutional: Positive for fever. Negative for chills and weight loss.   HENT: Positive for postnasal drip. Negative for ear pain, rhinorrhea and sore throat.    Eyes: Negative.    Respiratory: Positive for cough. Negative for hemoptysis, shortness of breath and wheezing.    Cardiovascular: Negative.  Negative for chest pain.   Gastrointestinal: Negative.  Negative for heartburn.   Genitourinary: Negative.    Musculoskeletal: Positive for myalgias.   Skin: Negative.  Negative for rash.   Allergic/Immunologic: Positive for environmental allergies.   Neurological: Positive for headaches.   Hematological: Negative.        Objective:      Physical Exam   Constitutional: She is oriented to person, place, and time. She appears well-developed and well-nourished. No distress.   HENT:   Head: Normocephalic and atraumatic.    Right Ear: External ear normal.   Left Ear: External ear normal.   Nose: Mucosal edema and rhinorrhea present.   Mouth/Throat: No oropharyngeal exudate.   Eyes: Conjunctivae and EOM are normal. Pupils are equal, round, and reactive to light. Right eye exhibits no discharge. Left eye exhibits no discharge. No scleral icterus.   Neck: Normal range of motion. Neck supple. No JVD present. No tracheal deviation present. No thyromegaly present.   Cardiovascular: Normal rate, regular rhythm and normal heart sounds. Exam reveals no gallop and no friction rub.   No murmur heard.  Pulmonary/Chest: Effort normal and breath sounds normal. No stridor. No respiratory distress. She has no wheezes. She has no rales. She exhibits no tenderness.   Abdominal: Soft. Bowel sounds are normal. She exhibits no distension. There is no tenderness. There is no rebound and no guarding.   Musculoskeletal: Normal range of motion.   Lymphadenopathy:     She has no cervical adenopathy.   Neurological: She is alert and oriented to person, place, and time.   Skin: Skin is warm and dry. She is not diaphoretic.   Psychiatric: She has a normal mood and affect. Her behavior is normal. Judgment and thought content normal.       Assessment:       Acute non-recurrent maxillary sinusitis    Seasonal allergic rhinitis due to other allergic trigger    Herpes labialis    Other orders  -     predniSONE (DELTASONE) 20 MG tablet; Take 3 pills daily for 3 days, then 2 pills daily for 3 days, then 1 pill daily for 3 days, then 1/2 pill daily for 4 days.  Dispense: 20 tablet; Refill: 0  -     amoxicillin-clavulanate 875-125mg (AUGMENTIN) 875-125 mg per tablet; Take 1 tablet by mouth every 12 (twelve) hours.  Dispense: 20 tablet; Refill: 0  -     acyclovir (ZOVIRAX) 400 MG tablet; 400 mg 5 times daily for 5 days  Dispense: 25 tablet; Refill: 0      Plan:     Josh was seen today for sinus problem.    Diagnoses and all orders for this visit:    Seasonal allergic  rhinitis due to other allergic trigger     Call or return to clinic prn if these symptoms worsen or fail to improve as anticipated.

## 2018-11-25 ENCOUNTER — PATIENT MESSAGE (OUTPATIENT)
Dept: FAMILY MEDICINE | Facility: CLINIC | Age: 43
End: 2018-11-25

## 2018-11-26 ENCOUNTER — TELEPHONE (OUTPATIENT)
Dept: FAMILY MEDICINE | Facility: CLINIC | Age: 43
End: 2018-11-26

## 2018-11-26 DIAGNOSIS — B00.1 HERPES LABIALIS: ICD-10-CM

## 2018-11-26 RX ORDER — ACYCLOVIR 400 MG/1
TABLET ORAL
Qty: 25 TABLET | Refills: 0 | Status: SHIPPED | OUTPATIENT
Start: 2018-11-26 | End: 2019-05-27

## 2019-04-17 ENCOUNTER — OFFICE VISIT (OUTPATIENT)
Dept: ORTHOPEDICS | Facility: CLINIC | Age: 44
End: 2019-04-17
Payer: COMMERCIAL

## 2019-04-17 VITALS
HEIGHT: 65 IN | WEIGHT: 197.31 LBS | HEART RATE: 94 BPM | BODY MASS INDEX: 32.87 KG/M2 | SYSTOLIC BLOOD PRESSURE: 112 MMHG | DIASTOLIC BLOOD PRESSURE: 72 MMHG

## 2019-04-17 DIAGNOSIS — M75.42 SHOULDER IMPINGEMENT SYNDROME, LEFT: Primary | ICD-10-CM

## 2019-04-17 DIAGNOSIS — S46.212A BICEPS MUSCLE STRAIN, LEFT, INITIAL ENCOUNTER: ICD-10-CM

## 2019-04-17 PROCEDURE — 99999 PR PBB SHADOW E&M-EST. PATIENT-LVL III: ICD-10-PCS | Mod: PBBFAC,,, | Performed by: PHYSICAL MEDICINE & REHABILITATION

## 2019-04-17 PROCEDURE — 3008F BODY MASS INDEX DOCD: CPT | Mod: CPTII,S$GLB,, | Performed by: PHYSICAL MEDICINE & REHABILITATION

## 2019-04-17 PROCEDURE — 99204 PR OFFICE/OUTPT VISIT, NEW, LEVL IV, 45-59 MIN: ICD-10-PCS | Mod: S$GLB,,, | Performed by: PHYSICAL MEDICINE & REHABILITATION

## 2019-04-17 PROCEDURE — 99204 OFFICE O/P NEW MOD 45 MIN: CPT | Mod: S$GLB,,, | Performed by: PHYSICAL MEDICINE & REHABILITATION

## 2019-04-17 PROCEDURE — 99999 PR PBB SHADOW E&M-EST. PATIENT-LVL III: CPT | Mod: PBBFAC,,, | Performed by: PHYSICAL MEDICINE & REHABILITATION

## 2019-04-17 PROCEDURE — 3008F PR BODY MASS INDEX (BMI) DOCUMENTED: ICD-10-PCS | Mod: CPTII,S$GLB,, | Performed by: PHYSICAL MEDICINE & REHABILITATION

## 2019-04-17 RX ORDER — CETIRIZINE HYDROCHLORIDE 10 MG/1
10 TABLET ORAL DAILY
COMMUNITY
End: 2020-04-14

## 2019-04-17 RX ORDER — NAPROXEN 500 MG/1
500 TABLET ORAL 2 TIMES DAILY
Qty: 60 TABLET | Refills: 0 | Status: SHIPPED | OUTPATIENT
Start: 2019-04-17 | End: 2019-08-28

## 2019-04-17 NOTE — PATIENT INSTRUCTIONS
Shoulder Impingement Syndrome  The rotator cuff is a group of muscles and tendons that surround the shoulder joint. These muscles and tendons hold the arm in its joint. They help the shoulder move. The rotator cuff muscles and tendons can become irritated from repeated rubbing against the shoulder bone. This is called shoulder impingement syndrome or rotator cuff tendonitis.     If your case is mild, you may only need to rest the shoulder and then do certain exercises to strengthen the muscles. You can also take anti-inflammatory medicines. Steroid injections into the shoulder can ease inflammation. But you can have only a limited number of these. If the condition gets worse, your shoulder muscles may become thin and weak. This can lead to a rotator cuff tear.  Symptoms of shoulder impingement syndrome may include:  · Shoulder pain that gets worse when you raise your arm overhead  · Weakness of the shoulder muscles when you use your arm overhead  · Popping and clicking when you move your shoulder  · Shoulder pain that wakes you up at night, especially when you sleep on the affected shoulder  · Sudden pain in your shoulder when you lift or reach  Home care  Follow these tips to take care of yourself at home:  · Avoid activities that make your pain worse. These include raising your arms overhead, repeating the same motion over and over, or lifting heavy objects.  · Dont hold your arm in one position for a long time. Keep it moving.  · Put an ice pack on the sore area for 20 minutes every 1 to 2 hours for the first day. You can make an ice pack by putting ice cubes in a plastic bag. Wrap the bag in a towel before putting it on your shoulder. A frozen bag of peas or something similar can also be used as an ice pack. Use the ice packs 3 to 4 times a day for the next 2 days. Continue using the ice to relieve of pain and swelling as needed.  · You may take acetaminophen or ibuprofen to control pain, unless another  medicine was prescribed. If prednisone was prescribed, dont take anti-inflammatory medicines. If you have chronic liver or kidney disease or ever had a stomach ulcer or gastrointestinal bleeding, talk with your doctor before using these medicines.  · After your symptoms ease, you may get physical therapy or start a home exercise program. This can strengthen your shoulder muscles and help your range of motion. Talk with your doctor about what is best for your condition.  Follow-up care  Follow up with your healthcare provider, or as advised.  When to seek medical advice  Call your healthcare provider right away if any of these occur:  · Shoulder pain that gets worse and wakes you up at night  · Your shoulder or arm swells  · Numbness, tingling, or pain that travels down the arm to the hand  · Loss of shoulder strength  · Fever or chills  Date Last Reviewed: 8/1/2016  © 2304-1248 [employer]. [address] [place] [address], [place] [address]. All rights reserved. This information is not intended as a substitute for professional medical care. Always follow your healthcare professional's instructions.

## 2019-04-17 NOTE — PROGRESS NOTES
PM&R NEW PATIENT HISTORY & PHYSICAL:    Referring Physician: Self, Aaareferral    Chief Complaint   Patient presents with    Shoulder Pain     Left shoulder pain       HPI: This is a 44 y.o.  female being seen in clinic today for evaluation of Shoulder Pain (Left shoulder pain)   The problem first began several weeks ago. Might have been flared up by starting to exercise and doing push-ups. She feels aching and dull pain in her left shoulder with some radiation down arm. The symptoms are worsening. It is also worse near end of day. Not waking her up at night. Works as . She has tried aleve sparingly, tiger balm, and rest without improvement. She has not tried physical therapy. Several years ago had similar pain in right shoulder and let it go for longer before seeing someone, but during PT and still working out was diagnosed with frozen shoulder. Was treated at [facility], Dr. Jigar Sheikh.    History obtained from patient.    Past family, medical, social, surgical history, and vital signs reviewed in chart.    Review of Systems   Constitutional: Negative for chills, fever and weight loss.   HENT: Negative for hearing loss and sore throat.    Eyes: Negative for blurred vision, photophobia and pain.   Respiratory: Negative for shortness of breath.    Cardiovascular: Negative for chest pain.   Gastrointestinal: Negative for abdominal pain.   Genitourinary: Negative for dysuria.   Musculoskeletal: Positive for joint pain (multiple joint pains).   Skin: Negative for rash.   Neurological: Negative for tingling and headaches.   Endo/Heme/Allergies: Does not bruise/bleed easily.   Psychiatric/Behavioral: Negative for depression.       Physical Exam   Constitutional: She is oriented to person, place, and time. She appears well-developed and well-nourished.   HENT:   Head: Normocephalic and atraumatic.   Eyes: Pupils are equal, round, and reactive to light. EOM are normal.   Neck: Normal range of motion. Neck  supple.   Cardiovascular: Intact distal pulses.   Pulmonary/Chest: Effort normal.   Abdominal: She exhibits no distension.   Musculoskeletal:   Full left shoulder ROM with flex, abd, internal rotation, but pain with IR. Pain and 4+/5 shoulder abduction/flexion. Increased pain, but no weakness with empty can test. Full strength with external rotation. Full strength with elbow flex/ext, wrist flex/ext, finger ext/abd. Normal sensation throughout bilateral UE. Normal 2+ reflexes but with bilateral + Hoffmans. + Sutton, Negative apprehension test. + Speeds. Tender over proximal biceps tendon and coracoid process.    Neurological: She is alert and oriented to person, place, and time.   Skin: Skin is warm and dry.   Psychiatric: She has a normal mood and affect.   Vitals reviewed.      IMPRESSION/PLAN: Josh is a 44 y.o.  female with:    1. Shoulder impingement syndrome, left  - naproxen (NAPROSYN) 500 MG tablet; Take 1 tablet (500 mg total) by mouth 2 (two) times daily.  Dispense: 60 tablet; Refill: 0  - Ambulatory referral to Physical Therapy    2. Biceps muscle strain, left, initial encounter  - naproxen (NAPROSYN) 500 MG tablet; Take 1 tablet (500 mg total) by mouth 2 (two) times daily.  Dispense: 60 tablet; Refill: 0  - Ambulatory referral to Physical Therapy       The findings were discussed with Josh in detail. We'll start the naproxen and she will take it scheduled for a few weeks before switching to PRN. She would like to do PT in [place] and we will send that referral.  All of her questions were answered. She'll take the shoulder through its full AROM several times daily. She was provided this plan in writing. She will follow-up with me in 6 weeks.     Maritza Eagle M.D.  Physical Medicine and Rehab

## 2019-05-27 ENCOUNTER — OFFICE VISIT (OUTPATIENT)
Dept: FAMILY MEDICINE | Facility: CLINIC | Age: 44
End: 2019-05-27
Attending: FAMILY MEDICINE
Payer: COMMERCIAL

## 2019-05-27 VITALS
TEMPERATURE: 97 F | OXYGEN SATURATION: 99 % | SYSTOLIC BLOOD PRESSURE: 122 MMHG | DIASTOLIC BLOOD PRESSURE: 72 MMHG | BODY MASS INDEX: 33.18 KG/M2 | WEIGHT: 199.44 LBS | HEART RATE: 91 BPM

## 2019-05-27 DIAGNOSIS — E66.9 OBESITY, UNSPECIFIED CLASSIFICATION, UNSPECIFIED OBESITY TYPE, UNSPECIFIED WHETHER SERIOUS COMORBIDITY PRESENT: Primary | ICD-10-CM

## 2019-05-27 DIAGNOSIS — E88.819 INSULIN RESISTANCE: ICD-10-CM

## 2019-05-27 PROCEDURE — 99214 OFFICE O/P EST MOD 30 MIN: CPT | Mod: S$GLB,,, | Performed by: FAMILY MEDICINE

## 2019-05-27 PROCEDURE — 3008F PR BODY MASS INDEX (BMI) DOCUMENTED: ICD-10-PCS | Mod: CPTII,S$GLB,, | Performed by: FAMILY MEDICINE

## 2019-05-27 PROCEDURE — 99214 PR OFFICE/OUTPT VISIT, EST, LEVL IV, 30-39 MIN: ICD-10-PCS | Mod: S$GLB,,, | Performed by: FAMILY MEDICINE

## 2019-05-27 PROCEDURE — 99999 PR PBB SHADOW E&M-EST. PATIENT-LVL III: CPT | Mod: PBBFAC,,, | Performed by: FAMILY MEDICINE

## 2019-05-27 PROCEDURE — 99999 PR PBB SHADOW E&M-EST. PATIENT-LVL III: ICD-10-PCS | Mod: PBBFAC,,, | Performed by: FAMILY MEDICINE

## 2019-05-27 PROCEDURE — 3008F BODY MASS INDEX DOCD: CPT | Mod: CPTII,S$GLB,, | Performed by: FAMILY MEDICINE

## 2019-05-27 RX ORDER — METFORMIN HYDROCHLORIDE 500 MG/1
500 TABLET, EXTENDED RELEASE ORAL
Qty: 30 TABLET | Refills: 3 | Status: SHIPPED | OUTPATIENT
Start: 2019-05-27 | End: 2019-08-28 | Stop reason: SDUPTHER

## 2019-05-27 RX ORDER — INSULIN PUMP SYRINGE, 3 ML
EACH MISCELLANEOUS
Qty: 1 EACH | Refills: 0 | Status: SHIPPED | OUTPATIENT
Start: 2019-05-27 | End: 2023-08-31

## 2019-05-27 RX ORDER — LANCETS
EACH MISCELLANEOUS
Qty: 180 EACH | Refills: 0 | Status: SHIPPED | OUTPATIENT
Start: 2019-05-27 | End: 2023-08-31

## 2019-05-27 RX ORDER — FLUCONAZOLE 150 MG/1
TABLET ORAL
Refills: 0 | COMMUNITY
Start: 2019-04-22 | End: 2019-08-28

## 2019-05-27 RX ORDER — MISOPROSTOL 200 UG/1
TABLET ORAL
COMMUNITY
Start: 2019-05-22 | End: 2019-08-28

## 2019-05-27 NOTE — PATIENT INSTRUCTIONS
Diet: Diabetes  Food is an important tool that you can use to control diabetes and stay healthy. Eating well-balanced meals in the correct amounts will help you control your blood glucose levels and prevent low blood sugar reactions. It will also help you reduce the health risks of diabetes. There is no one specific diet that is right for everyone with diabetes. But there are general guidelines to follow. A registered dietitian (RD) will create a tailored diet approach thats just right for you. He or she will also help you plan healthy meals and snacks. If you have any questions, call your dietitian for advice.     Guidelines for success  Talk with your healthcare provider before starting a diabetes diet or weight loss program. If you haven't talked with a dietitian yet, ask your provider for a referral. The following guidelines can help you succeed:  · Select foods from the 6 food groups below. Your dietitian will help you find food choices within each group. He or she will also show you serving sizes and how many servings you can have at each meal.  ¨ Grains, beans, and starchy vegetables  ¨ Vegetables  ¨ Fruit  ¨ Milk or yogurt  ¨ Meat, poultry, fish, or tofu  ¨ Healthy fats  · Check your blood sugar levels as directed by your provider. Take any medicine as prescribed by your provider.  · Learn to read food labels and pick the right portion sizes.  · Eat only the amount of food in your meal plan. Eat about the same amount of food at regular times each day. Dont skip meals. Eat meals 4 to 5 hours apart, with snacks in between.  · Limit alcohol. It raises blood sugar levels. Drink water or calorie-free diet drinks that use safe sweeteners.  · Eat less fat to help lower your risk of heart disease. Use nonfat or low-fat dairy products and lean meats. Avoid fried foods. Use cooking oils that are unsaturated, such as olive, canola, or peanut oil.  · Talk with your dietitian about safe sugar substitutes.  · Avoid  added salt. It can contribute to high blood pressure, which can cause heart disease. People with diabetes already have a risk of high blood pressure and heart disease.  · Stay at a healthy weight. If you need to lose weight, cut down on your portion sizes. But dont skip meals. Exercise is an important part of any weight management program. Talk with your provider about an exercise program thats right for you.  · For more information about the best diet plan for you, talk with a registered dietitian (RD). To find an RD in your area, contact:  ¨ Academy of Nutrition and Dietetics www.eatright.org  ¨ The American Diabetes Association 066-148-8437 www.diabetes.org  Date Last Reviewed: 8/1/2016 © 2000-2017 [employer]. [address] [place] [address], [place] [address]. All rights reserved. This information is not intended as a substitute for professional medical care. Always follow your healthcare professional's instructions.        Eating Out When You Have Diabetes  Eating right is an important part of keeping your blood sugar in your target range. You just need to make healthy choices.    Tips for restaurant meals  When you eat away from home try these tips:  · Try to schedule your dining-out meal at your normal meal time. Make a reservation if possible, so you don't have to wait to eat. If you can't make a reservation, try to arrive at the restaurant at a less-busy time to cut down your wait time. Eat a small fruit or starch snack at your regular mealtime if your restaurant meal is going to be later than usual.   · Call ahead to see if the restaurant can meet your dietary needs if you've never been there before. Or you can go online to see the menu ahead of time.  · Carry some crackers with you in case the restaurant needs you to wait until you can be served.  · Ask how foods are prepared before you order.  · Instead of fried, sautéed, or breaded foods, choose ones that are broiled, steamed, grilled, or  baked.  · Ask for sauces, gravies, and dressings on the side.  · Only eat an amount that fits your meal plan. Remember: You can take home the leftovers.  · Save dessert for special occasions. Then choose a small dessert or share one with a friend or family member.  Make healthy choices  Fast food  · Garden salad with light dressing on the side  · Baked potato with vegetables or herbs  · Broiled, roasted, or grilled chicken sandwich  · Sliced turkey or lean roast beef sandwich  Mexican  · Chicken enchilada, without cheese or sour cream   · Small burrito with whole beans and chicken  · Whole beans (not refried) and rice  · Chicken or fish fajitas  Steakhouse  · Grilled or broiled lean cuts of beef  · Baked potato with vegetables or herbs  · Broiled or baked chicken. Dont eat the skin.  · Steamed vegetables  Asian  · Steamed dumplings or potstickers  · Broiled, boiled, or steamed meats or fish  · Sushi or sashimi  · Steamed rice or boiled noodles. One serving is equal to 1/3 cup.  Date Last Reviewed: 6/1/2016  © 2002-0445 [employer]. [address] [place] [address], [place] [address]. All rights reserved. This information is not intended as a substitute for professional medical care. Always follow your healthcare professional's instructions.        Diabetes: Meal Planning    You can help keep your blood sugar level in your target range by eating healthy foods. Your healthcare team can help you create a low-fat, nutritious meal plan. Take an active role in your diabetes management by following your meal plan and working with your healthcare team.  Make your meal plan  A meal plan gives guidelines for the types and amounts of food you should eat. The goal is to balance food and insulin (or other diabetes medications) so your blood sugars will be in your target range. Your dietitian will help you make a flexible meal plan that includes many foods that you like.  Watch serving sizes  Your meal plan will group foods  by servings. To learn how much a serving is, start by measuring food portions at each meal. Soon youll know what a serving looks like on your plate. Ask your healthcare provider about how to balance servings of different foods.  Eat from all the food groups  The basis of a healthy meal plan is variety (eating lots of different foods). Choose lean meats, fresh fruits and vegetables, whole grains, and low-fat or nonfat dairy products. Eating a wide variety of foods provides the nutrients your body needs. It can also keep you from getting bored with your meal plan.  Learn about carbohydrates, fats, and protein  · Carbohydrates are starches, sugars, and fiber. They are found in many foods, including fruit, bread, pasta, milk, and sweets. Of all the foods you eat, carbohydrates have the most effect on your blood sugar. Your dietitian may teach you about carb counting, a way to figure out the number of carbohydrates in a meal.  · Fats have the most calories. They also have the most effect on your weight and your risk of heart disease. When you have diabetes, its important to control your weight and protect your heart. Foods that are high in fat include whole milk, cheese, snack foods, and desserts.  · Protein is important for building and repairing muscles and bones. Choose low-fat protein sources, such as fish, egg whites, and skinless chicken.  Reduce liquid sugars  Extra calories from sodas, sports drinks, and fruit drinks make it hard to keep blood sugar in range. Cut as many liquid sugars from your meal plan as you can.  This includes most fruit juices, which are often high in natural or added sugar. Instead, drink plenty of water and other sugar-free beverages.  Eat less fat  If you need to lose weight, try to reduce the amount of fat in your diet. This can also help lower your cholesterol level to keep blood vessels healthier. Cut fat by using only small amounts of liquid oil for cooking. Read food labels  carefully to avoid foods with unhealthy trans fats.  Timing your meals  When it comes to blood sugar control, when you eat is as important as what you eat. You may need to eat several small meals spaced evenly throughout the day to stay in your target range. So dont skip breakfast or wait until late in the day to get most of your calories. Doing so can cause your blood sugar to rise too high or fall too low.   Date Last Reviewed: 3/1/2016  © 5960-5234 [employer]. [address] [place] [address], [place] [address]. All rights reserved. This information is not intended as a substitute for professional medical care. Always follow your healthcare professional's instructions.        Diabetes: Learning About Serving and Portion Sizes     A good rule of thumb: Devote half your plate to vegetables and green salad. Split the other half between protein and starchy carbohydrates. Fruit makes a good dessert.     Servings and portions. Whats the difference? These terms can be very confusing. But learning to measure serving sizes can help you figure out how many carbohydrates (carbs) and other foods you eat each day. They are also powerful tools for managing your weight.  Servings and portions  Many different words are used to describe amounts of food. If your health care provider uses a term youre not sure of, dont be afraid to ask. It helps to know the difference between servings and portions:  · A serving size is a fixed size. Food producers use this term to describe their products. For example, the label on a cereal box could say that 1 cup of dry cereal = 1 serving.  · A portion (also called a helping) is how much you eat or how much you put on your plate at a meal. For example, you might eat 2 cups of cereal at breakfast.  Using serving information  The portion you choose to eat (such as 2 cups of cereal) may be more than one serving as listed on the food label (such as 1 cup of cereal). Thats why it helps to  measure or weigh the food you eat. Because the food label values are based on servings, youll need to know how many servings you eat at one sitting.     Ounces: 2 to 3 ounces is about the size of your palm.       1 Cup: 1 cup (or a medium-sized piece) is about the size of your fist.       1/2 Cup: 1/2 cup is about the size of your cupped hand.      One tablespoon is about the size of your thumb.  One teaspoon is about the size of the tip of your thumb.  Keeping track of serving sizes  When youre planning for a snack or a meal, keep servings in mind. If you dont have measuring cups or a scale handy, there are ways to eyeball serving sizes, such as comparing your food to the size of your hand (see pictures above).  Managing portion sizes  If your weight is a concern, reducing your portions can help. You can eat more than one serving of a food at once. But to keep from eating too much at one meal, learn how to manage your portions. A portion is the amount of each type of food on your plate. See the plate diagram for an example of balanced portions.  Date Last Reviewed: 3/1/2016  © 6995-7191 [employer]. [address] [place] [address], [place] [address]. All rights reserved. This information is not intended as a substitute for professional medical care. Always follow your healthcare professional's instructions.

## 2019-05-27 NOTE — PROGRESS NOTES
Subjective:       Patient ID: Josh Simental is a 44 y.o. female.    Chief Complaint: Abnormal Lab (discuss labs)    44  y old female who was recently  told by her GYN that she is mild insulin resistant here  To discuss treatment rec . Not exercising  since she started  working 6 m ago . Tries to keep a healthful diet . Strong fam  hx of t2 dm in family .     Review of Systems   Constitutional: Negative.    HENT: Negative.    Eyes: Negative.    Respiratory: Negative.    Cardiovascular: Negative.    Gastrointestinal: Negative.    Genitourinary: Negative.    Musculoskeletal: Negative.    Skin: Negative.    Hematological: Negative.        Objective:      Physical Exam   Constitutional: She is oriented to person, place, and time. No distress.   HENT:   Head: Normocephalic and atraumatic.   Right Ear: External ear normal.   Left Ear: External ear normal.   Mouth/Throat: No oropharyngeal exudate.   Eyes: Pupils are equal, round, and reactive to light. Conjunctivae and EOM are normal. Right eye exhibits no discharge. Left eye exhibits no discharge. No scleral icterus.   Neck: Normal range of motion. Neck supple. No JVD present. No tracheal deviation present. No thyromegaly present.   Cardiovascular: Normal rate, regular rhythm and normal heart sounds. Exam reveals no gallop and no friction rub.   No murmur heard.  Pulmonary/Chest: Effort normal and breath sounds normal. No stridor. No respiratory distress. She has no wheezes. She has no rales. She exhibits no tenderness.   Abdominal: Soft. Bowel sounds are normal. She exhibits no distension. There is no tenderness. There is no rebound and no guarding.   Musculoskeletal: Normal range of motion.   Lymphadenopathy:     She has no cervical adenopathy.   Neurological: She is alert and oriented to person, place, and time.   Skin: Skin is warm and dry. She is not diaphoretic.   Psychiatric: She has a normal mood and affect. Her behavior is normal. Judgment and thought  content normal.       Assessment:     Josh was seen today for abnormal lab.    Diagnoses and all orders for this visit:    Obesity, unspecified classification, unspecified obesity type, unspecified whether serious comorbidity present    Insulin resistance    Other orders  -     metFORMIN (GLUCOPHAGE-XR) 500 MG 24 hr tablet; Take 1 tablet (500 mg total) by mouth daily with breakfast.  -     blood-glucose meter kit; To check BG 2 times daily, to use with insurance preferred meter  -     lancets Misc; To check BG 2 times daily, to use with insurance preferred meter  -     blood sugar diagnostic Strp; To check BG 2 times daily, to use with insurance preferred meter      Plan:   Diet and ex   Metformin   SE discussed   Labs reviewed. F.u in 6 m

## 2019-08-26 ENCOUNTER — PATIENT OUTREACH (OUTPATIENT)
Dept: ADMINISTRATIVE | Facility: HOSPITAL | Age: 44
End: 2019-08-26

## 2019-08-26 NOTE — PROGRESS NOTES
Health Maintenance reviewed.   Immunizations: Abstracted.  Care Everywhere abstracted: No new results found.  Health Maintenance Due   Topic    TETANUS VACCINE     Mammogram    GEOFF:MAMMO:DUE

## 2019-08-28 ENCOUNTER — OFFICE VISIT (OUTPATIENT)
Dept: FAMILY MEDICINE | Facility: CLINIC | Age: 44
End: 2019-08-28
Attending: FAMILY MEDICINE
Payer: COMMERCIAL

## 2019-08-28 VITALS
OXYGEN SATURATION: 97 % | SYSTOLIC BLOOD PRESSURE: 116 MMHG | DIASTOLIC BLOOD PRESSURE: 74 MMHG | HEART RATE: 89 BPM | HEIGHT: 65 IN | TEMPERATURE: 99 F | WEIGHT: 193.81 LBS | BODY MASS INDEX: 32.29 KG/M2

## 2019-08-28 DIAGNOSIS — E66.9 OBESITY, UNSPECIFIED CLASSIFICATION, UNSPECIFIED OBESITY TYPE, UNSPECIFIED WHETHER SERIOUS COMORBIDITY PRESENT: ICD-10-CM

## 2019-08-28 DIAGNOSIS — R73.01 IFG (IMPAIRED FASTING GLUCOSE): Primary | ICD-10-CM

## 2019-08-28 DIAGNOSIS — E78.5 DYSLIPIDEMIA: ICD-10-CM

## 2019-08-28 PROCEDURE — 99214 OFFICE O/P EST MOD 30 MIN: CPT | Mod: S$GLB,,, | Performed by: FAMILY MEDICINE

## 2019-08-28 PROCEDURE — 3008F PR BODY MASS INDEX (BMI) DOCUMENTED: ICD-10-PCS | Mod: CPTII,S$GLB,, | Performed by: FAMILY MEDICINE

## 2019-08-28 PROCEDURE — 99214 PR OFFICE/OUTPT VISIT, EST, LEVL IV, 30-39 MIN: ICD-10-PCS | Mod: S$GLB,,, | Performed by: FAMILY MEDICINE

## 2019-08-28 PROCEDURE — 3008F BODY MASS INDEX DOCD: CPT | Mod: CPTII,S$GLB,, | Performed by: FAMILY MEDICINE

## 2019-08-28 PROCEDURE — 99999 PR PBB SHADOW E&M-EST. PATIENT-LVL III: CPT | Mod: PBBFAC,,, | Performed by: FAMILY MEDICINE

## 2019-08-28 PROCEDURE — 99999 PR PBB SHADOW E&M-EST. PATIENT-LVL III: ICD-10-PCS | Mod: PBBFAC,,, | Performed by: FAMILY MEDICINE

## 2019-08-28 RX ORDER — METFORMIN HYDROCHLORIDE 500 MG/1
500 TABLET, EXTENDED RELEASE ORAL
Qty: 90 TABLET | Refills: 3 | Status: SHIPPED | OUTPATIENT
Start: 2019-08-28 | End: 2020-10-01

## 2019-08-28 RX ORDER — VITAMIN E 268 MG
1 CAPSULE ORAL DAILY
COMMUNITY
End: 2021-05-19

## 2019-08-28 RX ORDER — MEDROXYPROGESTERONE ACETATE 10 MG/1
1 TABLET ORAL DAILY
Refills: 0 | COMMUNITY
Start: 2019-08-21 | End: 2020-02-27

## 2019-08-28 NOTE — PROGRESS NOTES
Subjective:       Patient ID: Josh Simental is a 44 y.o. female.    Chief Complaint: Follow-up    44 y old female with IFG, obesity and dlp here for f.u . On Metformin for 4 m. She has been working out 3 days weekly . Watching her diet ( low carb , high prot) . Flatulence at time . Highest fb: 112, highest 2 hr pp: 140     Review of Systems   Constitutional: Negative.    HENT: Negative.    Eyes: Negative.    Respiratory: Negative.    Cardiovascular: Negative.    Gastrointestinal: Negative.    Genitourinary: Negative.    Musculoskeletal: Negative.    Skin: Negative.    Hematological: Negative.        Objective:      Physical Exam   Constitutional: She is oriented to person, place, and time. No distress.   HENT:   Head: Normocephalic and atraumatic.   Right Ear: External ear normal.   Left Ear: External ear normal.   Mouth/Throat: No oropharyngeal exudate.   Eyes: Pupils are equal, round, and reactive to light. Conjunctivae and EOM are normal. Right eye exhibits no discharge. Left eye exhibits no discharge. No scleral icterus.   Neck: Normal range of motion. Neck supple. No JVD present. No tracheal deviation present. No thyromegaly present.   Cardiovascular: Normal rate, regular rhythm and normal heart sounds. Exam reveals no gallop and no friction rub.   No murmur heard.  Pulmonary/Chest: Effort normal and breath sounds normal. No stridor. No respiratory distress. She has no wheezes. She has no rales. She exhibits no tenderness.   Abdominal: Soft. Bowel sounds are normal. She exhibits no distension. There is no tenderness. There is no rebound and no guarding.   Musculoskeletal: Normal range of motion.   Lymphadenopathy:     She has no cervical adenopathy.   Neurological: She is alert and oriented to person, place, and time.   Skin: Skin is warm and dry. She is not diaphoretic.   Psychiatric: She has a normal mood and affect. Her behavior is normal. Judgment and thought content normal.       Assessment:        Josh was seen today for follow-up.    Diagnoses and all orders for this visit:    IFG (impaired fasting glucose)  -     CBC auto differential; Future  -     Comprehensive metabolic panel; Future  -     Hemoglobin A1c; Future  -     Lipid panel; Future    Dyslipidemia  -     CBC auto differential; Future  -     Comprehensive metabolic panel; Future  -     Hemoglobin A1c; Future  -     Lipid panel; Future    Obesity, unspecified classification, unspecified obesity type, unspecified whether serious comorbidity present    Other orders  -     metFORMIN (GLUCOPHAGE-XR) 500 MG 24 hr tablet; Take 1 tablet (500 mg total) by mouth daily with breakfast.      Plan:     Josh was seen today for follow-up.    Diagnoses and all orders for this visit:    IFG (impaired fasting glucose)  -     CBC auto differential; Future  -     Comprehensive metabolic panel; Future  -     Hemoglobin A1c; Future  -     Lipid panel; Future    Dyslipidemia  -     CBC auto differential; Future  -     Comprehensive metabolic panel; Future  -     Hemoglobin A1c; Future  -     Lipid panel; Future    Other orders  -     metFORMIN (GLUCOPHAGE-XR) 500 MG 24 hr tablet; Take 1 tablet (500 mg total) by mouth daily with breakfast.     Cont metformin .   Diet and ex  F.u in 6 m

## 2019-08-30 ENCOUNTER — PATIENT MESSAGE (OUTPATIENT)
Dept: FAMILY MEDICINE | Facility: CLINIC | Age: 44
End: 2019-08-30

## 2019-10-11 ENCOUNTER — LAB VISIT (OUTPATIENT)
Dept: LAB | Facility: HOSPITAL | Age: 44
End: 2019-10-11
Attending: FAMILY MEDICINE
Payer: COMMERCIAL

## 2019-10-11 DIAGNOSIS — R73.01 IFG (IMPAIRED FASTING GLUCOSE): ICD-10-CM

## 2019-10-11 DIAGNOSIS — E78.5 DYSLIPIDEMIA: ICD-10-CM

## 2019-10-11 LAB
ALBUMIN SERPL BCP-MCNC: 3.7 G/DL (ref 3.5–5.2)
ALP SERPL-CCNC: 67 U/L (ref 55–135)
ALT SERPL W/O P-5'-P-CCNC: 12 U/L (ref 10–44)
ANION GAP SERPL CALC-SCNC: 7 MMOL/L (ref 8–16)
AST SERPL-CCNC: 12 U/L (ref 10–40)
BASOPHILS # BLD AUTO: 0.04 K/UL (ref 0–0.2)
BASOPHILS NFR BLD: 0.7 % (ref 0–1.9)
BILIRUB SERPL-MCNC: 0.4 MG/DL (ref 0.1–1)
BUN SERPL-MCNC: 9 MG/DL (ref 6–20)
CALCIUM SERPL-MCNC: 9.8 MG/DL (ref 8.7–10.5)
CHLORIDE SERPL-SCNC: 105 MMOL/L (ref 95–110)
CHOLEST SERPL-MCNC: 189 MG/DL (ref 120–199)
CHOLEST/HDLC SERPL: 3.4 {RATIO} (ref 2–5)
CO2 SERPL-SCNC: 25 MMOL/L (ref 23–29)
CREAT SERPL-MCNC: 0.8 MG/DL (ref 0.5–1.4)
DIFFERENTIAL METHOD: ABNORMAL
EOSINOPHIL # BLD AUTO: 0.1 K/UL (ref 0–0.5)
EOSINOPHIL NFR BLD: 0.9 % (ref 0–8)
ERYTHROCYTE [DISTWIDTH] IN BLOOD BY AUTOMATED COUNT: 13.2 % (ref 11.5–14.5)
EST. GFR  (AFRICAN AMERICAN): >60 ML/MIN/1.73 M^2
EST. GFR  (NON AFRICAN AMERICAN): >60 ML/MIN/1.73 M^2
ESTIMATED AVG GLUCOSE: 100 MG/DL (ref 68–131)
GLUCOSE SERPL-MCNC: 86 MG/DL (ref 70–110)
HBA1C MFR BLD HPLC: 5.1 % (ref 4–5.6)
HCT VFR BLD AUTO: 39.6 % (ref 37–48.5)
HDLC SERPL-MCNC: 56 MG/DL (ref 40–75)
HDLC SERPL: 29.6 % (ref 20–50)
HGB BLD-MCNC: 12.2 G/DL (ref 12–16)
IMM GRANULOCYTES # BLD AUTO: 0.01 K/UL (ref 0–0.04)
IMM GRANULOCYTES NFR BLD AUTO: 0.2 % (ref 0–0.5)
LDLC SERPL CALC-MCNC: 114.2 MG/DL (ref 63–159)
LYMPHOCYTES # BLD AUTO: 1.5 K/UL (ref 1–4.8)
LYMPHOCYTES NFR BLD: 25.3 % (ref 18–48)
MCH RBC QN AUTO: 28.8 PG (ref 27–31)
MCHC RBC AUTO-ENTMCNC: 30.8 G/DL (ref 32–36)
MCV RBC AUTO: 94 FL (ref 82–98)
MONOCYTES # BLD AUTO: 0.4 K/UL (ref 0.3–1)
MONOCYTES NFR BLD: 6 % (ref 4–15)
NEUTROPHILS # BLD AUTO: 3.9 K/UL (ref 1.8–7.7)
NEUTROPHILS NFR BLD: 66.9 % (ref 38–73)
NONHDLC SERPL-MCNC: 133 MG/DL
NRBC BLD-RTO: 0 /100 WBC
PLATELET # BLD AUTO: 303 K/UL (ref 150–350)
PMV BLD AUTO: 10.7 FL (ref 9.2–12.9)
POTASSIUM SERPL-SCNC: 4.5 MMOL/L (ref 3.5–5.1)
PROT SERPL-MCNC: 7 G/DL (ref 6–8.4)
RBC # BLD AUTO: 4.23 M/UL (ref 4–5.4)
SODIUM SERPL-SCNC: 137 MMOL/L (ref 136–145)
TRIGL SERPL-MCNC: 94 MG/DL (ref 30–150)
WBC # BLD AUTO: 5.81 K/UL (ref 3.9–12.7)

## 2019-10-11 PROCEDURE — 80061 LIPID PANEL: CPT

## 2019-10-11 PROCEDURE — 85025 COMPLETE CBC W/AUTO DIFF WBC: CPT

## 2019-10-11 PROCEDURE — 36415 COLL VENOUS BLD VENIPUNCTURE: CPT | Mod: PO

## 2019-10-11 PROCEDURE — 83036 HEMOGLOBIN GLYCOSYLATED A1C: CPT

## 2019-10-11 PROCEDURE — 80053 COMPREHEN METABOLIC PANEL: CPT

## 2020-02-27 ENCOUNTER — OFFICE VISIT (OUTPATIENT)
Dept: FAMILY MEDICINE | Facility: CLINIC | Age: 45
End: 2020-02-27
Attending: FAMILY MEDICINE
Payer: COMMERCIAL

## 2020-02-27 VITALS
BODY MASS INDEX: 33.83 KG/M2 | WEIGHT: 203.06 LBS | SYSTOLIC BLOOD PRESSURE: 116 MMHG | OXYGEN SATURATION: 98 % | HEART RATE: 113 BPM | TEMPERATURE: 99 F | HEIGHT: 65 IN | DIASTOLIC BLOOD PRESSURE: 76 MMHG

## 2020-02-27 DIAGNOSIS — E88.819 INSULIN RESISTANCE: Primary | ICD-10-CM

## 2020-02-27 DIAGNOSIS — R60.9 EDEMA, UNSPECIFIED TYPE: ICD-10-CM

## 2020-02-27 DIAGNOSIS — E66.9 OBESITY, UNSPECIFIED CLASSIFICATION, UNSPECIFIED OBESITY TYPE, UNSPECIFIED WHETHER SERIOUS COMORBIDITY PRESENT: ICD-10-CM

## 2020-02-27 DIAGNOSIS — M72.2 PLANTAR FASCIITIS: ICD-10-CM

## 2020-02-27 PROCEDURE — 90471 IMMUNIZATION ADMIN: CPT | Mod: S$GLB,,, | Performed by: FAMILY MEDICINE

## 2020-02-27 PROCEDURE — 99999 PR PBB SHADOW E&M-EST. PATIENT-LVL III: ICD-10-PCS | Mod: PBBFAC,,, | Performed by: FAMILY MEDICINE

## 2020-02-27 PROCEDURE — 99999 PR PBB SHADOW E&M-EST. PATIENT-LVL III: CPT | Mod: PBBFAC,,, | Performed by: FAMILY MEDICINE

## 2020-02-27 PROCEDURE — 99214 PR OFFICE/OUTPT VISIT, EST, LEVL IV, 30-39 MIN: ICD-10-PCS | Mod: 25,S$GLB,, | Performed by: FAMILY MEDICINE

## 2020-02-27 PROCEDURE — 90715 TDAP VACCINE 7 YRS/> IM: CPT | Mod: S$GLB,,, | Performed by: FAMILY MEDICINE

## 2020-02-27 PROCEDURE — 99214 OFFICE O/P EST MOD 30 MIN: CPT | Mod: 25,S$GLB,, | Performed by: FAMILY MEDICINE

## 2020-02-27 PROCEDURE — 3008F PR BODY MASS INDEX (BMI) DOCUMENTED: ICD-10-PCS | Mod: CPTII,S$GLB,, | Performed by: FAMILY MEDICINE

## 2020-02-27 PROCEDURE — 90471 TDAP VACCINE GREATER THAN OR EQUAL TO 7YO IM: ICD-10-PCS | Mod: S$GLB,,, | Performed by: FAMILY MEDICINE

## 2020-02-27 PROCEDURE — 90715 TDAP VACCINE GREATER THAN OR EQUAL TO 7YO IM: ICD-10-PCS | Mod: S$GLB,,, | Performed by: FAMILY MEDICINE

## 2020-02-27 PROCEDURE — 3008F BODY MASS INDEX DOCD: CPT | Mod: CPTII,S$GLB,, | Performed by: FAMILY MEDICINE

## 2020-02-27 NOTE — PROGRESS NOTES
Subjective:       Patient ID: Josh Simental is a 44 y.o. female.    Chief Complaint: Follow-up    44 y old female with Insulin resit , obesity here for f.u . Walking and doing stretching  exercises  3-5 times weekly . Eating 3 meals .  cooks pasta often  For dinner . Also with bilateral heel pain with 1st step of am . Also when she stretches . Bothered by  Bilat leg and had sweeling as well . No orthopnea, CALLAWAY etc     Review of Systems   Constitutional: Negative for activity change and unexpected weight change.   HENT: Negative for hearing loss, rhinorrhea and trouble swallowing.    Eyes: Negative for discharge and visual disturbance.   Respiratory: Negative for chest tightness and wheezing.    Cardiovascular: Positive for palpitations. Negative for chest pain.   Gastrointestinal: Negative for blood in stool, constipation, diarrhea and vomiting.   Endocrine: Negative for polydipsia and polyuria.   Genitourinary: Positive for menstrual problem. Negative for difficulty urinating, dysuria and hematuria.   Musculoskeletal: Positive for arthralgias. Negative for joint swelling and neck pain.   Neurological: Positive for headaches. Negative for weakness.   Psychiatric/Behavioral: Negative for confusion and dysphoric mood.       Objective:      Physical Exam   Constitutional: She is oriented to person, place, and time. No distress.   HENT:   Head: Normocephalic and atraumatic.   Right Ear: External ear normal.   Left Ear: External ear normal.   Mouth/Throat: No oropharyngeal exudate.   Eyes: Pupils are equal, round, and reactive to light. Conjunctivae and EOM are normal. Right eye exhibits no discharge. Left eye exhibits no discharge. No scleral icterus.   Neck: Normal range of motion. Neck supple. No JVD present. No tracheal deviation present. No thyromegaly present.   Cardiovascular: Normal rate, regular rhythm and normal heart sounds. Exam reveals no gallop and no friction rub.   No murmur  heard.  Pulmonary/Chest: Effort normal and breath sounds normal. No stridor. No respiratory distress. She has no wheezes. She has no rales. She exhibits no tenderness.   Abdominal: Soft. Bowel sounds are normal. She exhibits no distension. There is no tenderness. There is no rebound and no guarding.   Musculoskeletal: Normal range of motion.   Lymphadenopathy:     She has no cervical adenopathy.   Neurological: She is alert and oriented to person, place, and time.   Skin: Skin is warm and dry. She is not diaphoretic.   Psychiatric: She has a normal mood and affect. Her behavior is normal. Judgment and thought content normal.       Assessment:       Josh was seen today for follow-up.    Diagnoses and all orders for this visit:    Insulin resistance  -     CBC auto differential; Future  -     Comprehensive metabolic panel; Future  -     Hemoglobin A1c; Future  -     Lipid panel; Future    Edema, unspecified type  -     Brain natriuretic peptide; Future    Plantar fasciitis    Obesity, unspecified classification, unspecified obesity type, unspecified whether serious comorbidity present    Other orders  -     (In Office Administered) Tdap Vaccine      Plan:     Josh was seen today for follow-up.    Diagnoses and all orders for this visit:    Insulin resistance  -     CBC auto differential; Future  -     Comprehensive metabolic panel; Future  -     Hemoglobin A1c; Future  -     Lipid panel; Future    Edema, unspecified type  -     Brain natriuretic peptide; Future    Other orders  -     (In Office Administered) Tdap Vaccine     diet and ex   Labs . Likely due to weight   Handout on stretches exercises provided   Eat fruits and vegetables for dinner instead

## 2020-04-08 ENCOUNTER — PATIENT MESSAGE (OUTPATIENT)
Dept: PHYSICAL MEDICINE AND REHAB | Facility: CLINIC | Age: 45
End: 2020-04-08

## 2020-04-09 ENCOUNTER — TELEPHONE (OUTPATIENT)
Dept: ORTHOPEDICS | Facility: CLINIC | Age: 45
End: 2020-04-09

## 2020-04-09 DIAGNOSIS — M25.512 LEFT SHOULDER PAIN, UNSPECIFIED CHRONICITY: Primary | ICD-10-CM

## 2020-04-09 NOTE — TELEPHONE ENCOUNTER
Spoke w/ patient to get an appt. Informed patient of the covid-19 policies. Offered patient telemedicine and she accepted. Informed her that she does need to xrays prior to the appt. Patient verbalized understanding. Also sent patient instructions. She was grateful for the call-DD

## 2020-04-13 ENCOUNTER — HOSPITAL ENCOUNTER (OUTPATIENT)
Dept: RADIOLOGY | Facility: HOSPITAL | Age: 45
Discharge: HOME | End: 2020-04-13
Attending: ORTHOPAEDIC SURGERY
Payer: COMMERCIAL

## 2020-04-13 DIAGNOSIS — M25.512 LEFT SHOULDER PAIN, UNSPECIFIED CHRONICITY: ICD-10-CM

## 2020-04-13 PROCEDURE — 73030 X-RAY EXAM OF SHOULDER: CPT | Mod: TC,FY,PO,LT

## 2020-04-13 PROCEDURE — 73030 X-RAY EXAM OF SHOULDER: CPT | Mod: 26,LT,, | Performed by: RADIOLOGY

## 2020-04-13 PROCEDURE — 73030 XR SHOULDER COMPLETE 2 OR MORE VIEWS LEFT: ICD-10-PCS | Mod: 26,LT,, | Performed by: RADIOLOGY

## 2020-04-14 ENCOUNTER — PATIENT MESSAGE (OUTPATIENT)
Dept: ORTHOPEDICS | Facility: CLINIC | Age: 45
End: 2020-04-14

## 2020-04-14 NOTE — PROGRESS NOTES
The patient location is: [place]  The chief complaint leading to consultation is: left shoulder pain  Visit type: audiovisual  Total time spent with patient: 20 minutes  Each patient to whom he or she provides medical services by telemedicine is:  (1) informed of the relationship between the physician and patient and the respective role of any other health care provider with respect to management of the patient; and (2) notified that he or she may decline to receive medical services by telemedicine and may withdraw from such care at any time.            Patient ID: Josh Simental  YOB: 1975  MRN: [id number]    Chief Complaint: left shoulder pain    Referred By: Dr. Maritza Pabon    History of Present Illness: Josh Simental is a right-hand dominant 45 y.o. female who is an . She was seen in the past by my colleague Dr. Pabon. Her pain had improved but recently has returned. It is mostly in the left shoulder and she localizes it laterally. She is also complaining of stiffness and limited range of motion. Has insulin resistance and pre-menopause.    Shoulder Pain    The pain is present in the left shoulder. The pain radiates to the left hand. This is a new problem. The current episode started 1 to 4 weeks ago. The problem occurs intermittently. The problem has been gradually worsening. The quality of the pain is described as aching, sharp, burning and shooting (stabbing). The pain is at a severity of 2/10 (at worst patient pain is a 7). Associated symptoms include a limited range of motion and stiffness. Pertinent negatives include no fever or numbness. The symptoms are aggravated by rotation, lifting, activity, extension, twisting and lying down (certain movements, reaching, ). She has tried cold, NSAIDs and heat for the symptoms. The treatment provided mild relief. Physical therapy was effective.      Past Medical History:   Past Medical History:   Diagnosis Date     Allergy     Anxiety     Depression     Migraines     Sinusitis      Past Surgical History:   Procedure Laterality Date    CHOLECYSTECTOMY  2017    EYE SURGERY      GALLBLADDER SURGERY  2017    TUBAL LIGATION       Family History   Problem Relation Age of Onset    Diabetes Mother     Thyroid disease Mother     Diabetes Father     Cancer Father         Renal     Cancer Maternal Grandmother         Cervical     Diabetes Maternal Grandfather     Cancer Maternal Grandfather         Prostate    Stroke Paternal Grandmother      Social History     Socioeconomic History    Marital status:      Spouse name: Not on file    Number of children: Not on file    Years of education: Not on file    Highest education level: Not on file   Occupational History    Not on file   Social Needs    Financial resource strain: Not hard at all    Food insecurity:     Worry: Never true     Inability: Never true    Transportation needs:     Medical: No     Non-medical: No   Tobacco Use    Smoking status: Former Smoker     Packs/day: 0.25     Years: 2.00     Pack years: 0.50     Types: Cigarettes     Last attempt to quit: 1999     Years since quittin.8    Smokeless tobacco: Never Used    Tobacco comment: maybe 1 pk a week    Substance and Sexual Activity    Alcohol use: No     Frequency: Never     Drinks per session: Patient refused     Binge frequency: Never    Drug use: No    Sexual activity: Yes     Partners: Male     Birth control/protection: None   Lifestyle    Physical activity:     Days per week: 2 days     Minutes per session: 40 min    Stress: To some extent   Relationships    Social connections:     Talks on phone: More than three times a week     Gets together: Once a week     Attends [employer] service: Not on file     Active member of club or organization: Yes     Attends meetings of clubs or organizations: More than 4 times per year     Relationship status:    Other Topics  Concern    Not on file   Social History Narrative    Not on file     Medication List with Changes/Refills   New Medications    DICLOFENAC SODIUM (VOLTAREN) 1 % GEL    Apply 2 g topically 4 (four) times daily.    NAPROXEN (EC NAPROSYN) 500 MG EC TABLET    Take 1 tablet (500 mg total) by mouth 2 (two) times daily as needed.   Current Medications    BLOOD SUGAR DIAGNOSTIC STRP    To check BG 2 times daily, to use with insurance preferred meter    BLOOD-GLUCOSE METER KIT    To check BG 2 times daily, to use with insurance preferred meter    LACTOBACILLUS ACIDOPHILUS (PROBIOTIC ORAL)    Take 1 tablet by mouth once daily.    LANCETS MISC    To check BG 2 times daily, to use with insurance preferred meter    METFORMIN (GLUCOPHAGE-XR) 500 MG 24 HR TABLET    Take 1 tablet (500 mg total) by mouth daily with breakfast.    VITAMIN E 400 UNIT CAPSULE    Take 1 capsule by mouth once daily.   Discontinued Medications    CETIRIZINE (ZYRTEC) 10 MG TABLET    Take 10 mg by mouth once daily.     Review of patient's allergies indicates:   Allergen Reactions    No known drug allergies      Review of Systems   Constitution: Negative for chills and fever.   HENT: Negative for ear discharge and hearing loss.    Eyes: Negative for blurred vision and visual disturbance.   Cardiovascular: Negative for chest pain and leg swelling.   Respiratory: Negative for cough and shortness of breath.    Endocrine: Negative for polyuria.   Hematologic/Lymphatic: Negative for bleeding problem.   Skin: Negative for rash.   Musculoskeletal: Positive for joint pain and stiffness. Negative for back pain, joint swelling, muscle cramps and muscle weakness.   Gastrointestinal: Negative for nausea and vomiting.   Genitourinary: Negative for hematuria.   Neurological: Negative for loss of balance, numbness and paresthesias.   Psychiatric/Behavioral: Negative for altered mental status.       Physical Exam: Limited by video exam  There is no height or weight on file  to calculate BMI.  There were no vitals filed for this visit.   GENERAL: Well appearing, appropriate for stated age, no acute distress.  PULMONARY: Respirations are even and non-labored.  NEURO: Awake, alert, and oriented x 3.  PSYCH: Mood & affect are appropriate.  HEENT: Head is normocephalic and atraumatic.  Ortho/SPM Exam  Left shoulder: appears to have active elevation to 80, and abduction to 50. Localizes pain over lateral shoulder. Intact extensor pollicis longus, flexor pollicis longus, finger flexion, finger extension, finger abduction and adduction. Appears sensory intact to radial, median, ulnar, and axillary nerve distributions. Hand warm and well perfused with capillary refill of less than 2 seconds, and palpable distal radial pulses.    Imaging:    X-Ray Shoulder 2 or More Views Left  Narrative: EXAMINATION:  XR SHOULDER COMPLETE 2 OR MORE VIEWS LEFT    CLINICAL HISTORY:  Pain in left shoulder    TECHNIQUE:  Two or three views of the left shoulder were performed.    COMPARISON:  None    FINDINGS:  No acute osseous abnormality without significant degenerative findings.  Visualized lung parenchyma clear.  Impression: As above    Electronically signed by: John Gutierrez MD  Date:    04/13/2020  Time:    08:44    Relevant imaging results reviewed and interpreted by me, discussed with the patient and / or family today.     Other Tests:     No other tests performed today.    Assessment:  Josh Simental is a 45 y.o. female with insulin resistance, pre-menopause, and left shoulder pain    Encounter Diagnosis   Name Primary?    Adhesive capsulitis of left shoulder Yes        Plan:  · My chart reminder for follow-up in 4 weeks, in person visit preferred  · Naproxen 500mg BID prn - provided appropriate warnings and education  · Voltaren  · Telemedicine PT 1x/wk, then exercises on your own 3x/week - Santana Sanders @ [facility]  · Medical optimization per primary care physician - discussed with  patient   Treatment plan was developed with input from the patient/family, and they expressed understanding and agreement with the plan. All questions were answered today.    Follow-up: 4 weeks or sooner if there are any problems between now and then.    Disclaimer: This note was prepared using a voice recognition system and is likely to have sound alike errors within the text.

## 2020-04-15 ENCOUNTER — PATIENT MESSAGE (OUTPATIENT)
Dept: ORTHOPEDICS | Facility: CLINIC | Age: 45
End: 2020-04-15

## 2020-04-15 ENCOUNTER — OFFICE VISIT (OUTPATIENT)
Dept: ORTHOPEDICS | Facility: CLINIC | Age: 45
End: 2020-04-15
Payer: COMMERCIAL

## 2020-04-15 ENCOUNTER — PATIENT OUTREACH (OUTPATIENT)
Dept: ADMINISTRATIVE | Facility: OTHER | Age: 45
End: 2020-04-15

## 2020-04-15 DIAGNOSIS — M75.02 ADHESIVE CAPSULITIS OF LEFT SHOULDER: Primary | ICD-10-CM

## 2020-04-15 DIAGNOSIS — Z12.31 ENCOUNTER FOR SCREENING MAMMOGRAM FOR MALIGNANT NEOPLASM OF BREAST: Primary | ICD-10-CM

## 2020-04-15 PROCEDURE — 99214 PR OFFICE/OUTPT VISIT, EST, LEVL IV, 30-39 MIN: ICD-10-PCS | Mod: 95,,, | Performed by: ORTHOPAEDIC SURGERY

## 2020-04-15 PROCEDURE — 99214 OFFICE O/P EST MOD 30 MIN: CPT | Mod: 95,,, | Performed by: ORTHOPAEDIC SURGERY

## 2020-04-15 RX ORDER — NAPROXEN 500 MG/1
500 TABLET ORAL 2 TIMES DAILY PRN
Qty: 60 TABLET | Refills: 0 | Status: SHIPPED | OUTPATIENT
Start: 2020-04-15 | End: 2020-04-15 | Stop reason: SDUPTHER

## 2020-04-15 RX ORDER — DICLOFENAC SODIUM 10 MG/G
2 GEL TOPICAL 4 TIMES DAILY
Qty: 1 TUBE | Refills: 0 | Status: SHIPPED | OUTPATIENT
Start: 2020-04-15 | End: 2020-04-15 | Stop reason: SDUPTHER

## 2020-04-15 RX ORDER — NAPROXEN 500 MG/1
500 TABLET ORAL 2 TIMES DAILY PRN
Qty: 60 TABLET | Refills: 0 | Status: SHIPPED | OUTPATIENT
Start: 2020-04-15 | End: 2021-05-19

## 2020-04-15 RX ORDER — DICLOFENAC SODIUM 10 MG/G
2 GEL TOPICAL 4 TIMES DAILY
Qty: 1 TUBE | Refills: 0 | Status: SHIPPED | OUTPATIENT
Start: 2020-04-15 | End: 2021-05-19

## 2020-04-15 NOTE — PATIENT INSTRUCTIONS
Diagnosis:  · Left frozen shoulder    Plan:  · My chart reminder for follow-up in 4 weeks, in person visit preferred  · Naproxen 500mg BID prn  · Voltaren  · Telemedicine PT 1x/wk, then exercises on your own 3x/week - Santana Sanders @ [facility]

## 2020-05-11 ENCOUNTER — PATIENT OUTREACH (OUTPATIENT)
Dept: ADMINISTRATIVE | Facility: OTHER | Age: 45
End: 2020-05-11

## 2020-05-11 ENCOUNTER — OFFICE VISIT (OUTPATIENT)
Dept: ORTHOPEDICS | Facility: CLINIC | Age: 45
End: 2020-05-11
Payer: COMMERCIAL

## 2020-05-11 ENCOUNTER — DOCUMENTATION ONLY (OUTPATIENT)
Dept: ORTHOPEDICS | Facility: CLINIC | Age: 45
End: 2020-05-11

## 2020-05-11 VITALS
HEART RATE: 69 BPM | DIASTOLIC BLOOD PRESSURE: 74 MMHG | SYSTOLIC BLOOD PRESSURE: 114 MMHG | HEIGHT: 65 IN | BODY MASS INDEX: 32.49 KG/M2 | WEIGHT: 195 LBS

## 2020-05-11 DIAGNOSIS — M75.02 ADHESIVE CAPSULITIS OF LEFT SHOULDER: Primary | ICD-10-CM

## 2020-05-11 PROCEDURE — 3008F PR BODY MASS INDEX (BMI) DOCUMENTED: ICD-10-PCS | Mod: CPTII,S$GLB,, | Performed by: ORTHOPAEDIC SURGERY

## 2020-05-11 PROCEDURE — 99999 PR PBB SHADOW E&M-EST. PATIENT-LVL IV: ICD-10-PCS | Mod: PBBFAC,,, | Performed by: ORTHOPAEDIC SURGERY

## 2020-05-11 PROCEDURE — 99999 PR PBB SHADOW E&M-EST. PATIENT-LVL IV: CPT | Mod: PBBFAC,,, | Performed by: ORTHOPAEDIC SURGERY

## 2020-05-11 PROCEDURE — 99213 PR OFFICE/OUTPT VISIT, EST, LEVL III, 20-29 MIN: ICD-10-PCS | Mod: S$GLB,,, | Performed by: ORTHOPAEDIC SURGERY

## 2020-05-11 PROCEDURE — 3008F BODY MASS INDEX DOCD: CPT | Mod: CPTII,S$GLB,, | Performed by: ORTHOPAEDIC SURGERY

## 2020-05-11 PROCEDURE — 99213 OFFICE O/P EST LOW 20 MIN: CPT | Mod: S$GLB,,, | Performed by: ORTHOPAEDIC SURGERY

## 2020-05-11 NOTE — PATIENT INSTRUCTIONS
Dx: left shoulder adhesive capsulitis    Plan:  · Schedule for US glenohumeral injection this Wed  · PT with [facility], start next week  · Follow-up 6 weeks  · Continue Naproxen & Tylenol    Thank you for choosing [facility] and Dr. Lukas Jara for your orthopedic & sports medicine care. It is our goal to provide you with exceptional care that will help keep you healthy, active, and get you back in the game.    If you felt that you received exemplary care today, please consider leaving us feedback on Healthgrades at [email or web address].     Please do not hesitate to reach out to us via email, phone, or MyChart with any questions, concerns, or feedback.    If you are experiencing pain/discomfort ,or have questions after 5pm and would like to be connected to the [facility] on-call team, please call this number and specify which Sports Medicine provider is treating you: [phone]

## 2020-05-11 NOTE — PROGRESS NOTES
Patient ID: Josh Simental  YOB: 1975  MRN: [id number]    Chief Complaint: Pain of the Left Shoulder    Referred By: Dr. Maritza Eagle    History of Present Illness: Josh Simental is a right-hand dominant 45 y.o. female  , here for a follow up of her left shoulder. She has a hx of biceps tendonitis which started about a year ago, she then did physical therapy at [facility] which she had significant benefit. She has noticed pain in the left shoulder that started about 3 months. Notices pain reaching and lifting . She has noticed weakness and limited range of motion. She tried Voltaren gel, naproxen, tylenol, and physical therapy with Santana Sanders(telemedicine). She states that she is unable to complete all the exercises due to pain. Denies fevers, chills, night sweats, numbness, and tingling.      Previous (4/15/2020) History of Present Illness: Josh Simental is a right-hand dominant 45 y.o. female who is an . She was seen in the past by my colleague Dr. Eagle. Her pain had improved but recently has returned. It is mostly in the left shoulder and she localizes it laterally. She is also complaining of stiffness and limited range of motion. Has insulin resistance and pre-menopause.    Shoulder Pain    The pain is present in the left shoulder. The pain radiates to the left arm. The current episode started more than 1 month ago. The problem occurs constantly. The problem has been unchanged. The quality of the pain is described as aching. The pain is at a severity of 3/10. Associated symptoms include tingling. Pertinent negatives include no fever, itching or numbness. The symptoms are aggravated by rotation, activity and exercise (sudden movements). She has tried NSAIDs and OTC pain meds for the symptoms. The treatment provided mild relief. Physical therapy was not tried.      Past Medical History:   Past Medical History:    Diagnosis Date    Allergy     Anxiety     Depression     Migraines     Sinusitis      Past Surgical History:   Procedure Laterality Date    CHOLECYSTECTOMY  2017    EYE SURGERY      GALLBLADDER SURGERY  2017    TUBAL LIGATION       Family History   Problem Relation Age of Onset    Diabetes Mother     Thyroid disease Mother     Diabetes Father     Cancer Father         Renal     Cancer Maternal Grandmother         Cervical     Diabetes Maternal Grandfather     Cancer Maternal Grandfather         Prostate    Stroke Paternal Grandmother      Social History     Socioeconomic History    Marital status:      Spouse name: Not on file    Number of children: Not on file    Years of education: Not on file    Highest education level: Not on file   Occupational History    Not on file   Social Needs    Financial resource strain: Not hard at all    Food insecurity:     Worry: Never true     Inability: Never true    Transportation needs:     Medical: No     Non-medical: No   Tobacco Use    Smoking status: Former Smoker     Packs/day: 0.25     Years: 2.00     Pack years: 0.50     Types: Cigarettes     Last attempt to quit: 1999     Years since quittin.9    Smokeless tobacco: Never Used    Tobacco comment: maybe 1 pk a week    Substance and Sexual Activity    Alcohol use: No     Frequency: Never     Drinks per session: Patient refused     Binge frequency: Never    Drug use: No    Sexual activity: Yes     Partners: Male     Birth control/protection: None   Lifestyle    Physical activity:     Days per week: 2 days     Minutes per session: 40 min    Stress: To some extent   Relationships    Social connections:     Talks on phone: More than three times a week     Gets together: Once a week     Attends [employer] service: Not on file     Active member of club or organization: Yes     Attends meetings of clubs or organizations: More than 4 times per year     Relationship status:     Other Topics Concern    Not on file   Social History Narrative    Not on file     Medication List with Changes/Refills   Current Medications    BLOOD SUGAR DIAGNOSTIC STRP    To check BG 2 times daily, to use with insurance preferred meter    BLOOD-GLUCOSE METER KIT    To check BG 2 times daily, to use with insurance preferred meter    DICLOFENAC SODIUM (VOLTAREN) 1 % GEL    Apply 2 g topically 4 (four) times daily.    LACTOBACILLUS ACIDOPHILUS (PROBIOTIC ORAL)    Take 1 tablet by mouth once daily.    LANCETS MISC    To check BG 2 times daily, to use with insurance preferred meter    METFORMIN (GLUCOPHAGE-XR) 500 MG 24 HR TABLET    Take 1 tablet (500 mg total) by mouth daily with breakfast.    NAPROXEN (EC NAPROSYN) 500 MG EC TABLET    Take 1 tablet (500 mg total) by mouth 2 (two) times daily as needed.    VITAMIN E 400 UNIT CAPSULE    Take 1 capsule by mouth once daily.     Review of patient's allergies indicates:   Allergen Reactions    No known drug allergies      Review of Systems   Constitution: Negative for chills and fever.   HENT: Negative for sore throat.    Eyes: Negative for blurred vision and pain.   Cardiovascular: Negative for chest pain, dyspnea on exertion and leg swelling.   Respiratory: Negative for cough and shortness of breath.    Hematologic/Lymphatic: Does not bruise/bleed easily.   Skin: Negative for itching and rash.   Musculoskeletal: Positive for joint pain.   Gastrointestinal: Negative for abdominal pain, nausea and vomiting.   Genitourinary: Negative for dysuria.   Neurological: Positive for tingling. Negative for dizziness, numbness and paresthesias.   Psychiatric/Behavioral: The patient does not have insomnia.        Physical Exam:   Body mass index is 32.45 kg/m².  Vitals:    05/11/20 0834   BP: 114/74   Pulse: 69      GENERAL: Well appearing, appropriate for stated age, no acute distress.  CARDIOVASCULAR: Pulses regular by peripheral palpation.  PULMONARY: Respirations  are even and non-labored.  NEURO: Awake, alert, and oriented x 3.  PSYCH: Mood & affect are appropriate.  HEENT: Head is normocephalic and atraumatic.  General    Nursing note and vitals reviewed.        Right Shoulder Exam   Right shoulder exam is normal.    Range of Motion   Active abduction: 120   Forward Flexion: 140   External Rotation 0 degrees: 60   Internal rotation 0 degrees: Mid thoracic     Other   Sensation: normal    Left Shoulder Exam     Tenderness   The patient is tender to palpation of the biceps tendon.    Range of Motion   Active abduction: 80   Passive abduction: 90   Forward Flexion: 110   External Rotation 0 degrees: 40   Internal rotation 0 degrees: Sacrum     Tests & Signs   Rotator Cuff Painful Arc/Range: moderate    Other   Sensation: normal     Comments:  5/5 strength with pain on testing. Significant pain on range of motion and strength testing.     Intact extensor pollicis longus, flexor pollicis longus, finger flexion, finger extension, finger abduction and adduction. Sensation intact to radial, median, ulnar, and axillary nerve distributions. Hand warm and well perfused with capillary refill of less than 2 seconds, and palpable distal radial pulses.        Muscle Strength   Right Upper Extremity   Shoulder Abduction: 5/5   Shoulder Internal Rotation: 5/5   Shoulder External Rotation: 5/5   Supraspinatus: 5/5/5   Subscapularis: 5/5/5   Biceps: 5/5/5   Left Upper Extremity  Shoulder Abduction: 5/5   Shoulder Internal Rotation: 5/5   Shoulder External Rotation: 5/5   Supraspinatus: 5/5/5   Subscapularis: 5/5/5   Biceps: 5/5/5     Vascular Exam     Right Pulses      Radial:                    2+      Capillary Refill  Right Hand: normal capillary refill  Left Hand: normal capillary refill        Imaging:    X-Ray Shoulder 2 or More Views Left  Narrative: EXAMINATION:  XR SHOULDER COMPLETE 2 OR MORE VIEWS LEFT    CLINICAL HISTORY:  Pain in left shoulder    TECHNIQUE:  Two or three views of  the left shoulder were performed.    COMPARISON:  None    FINDINGS:  No acute osseous abnormality without significant degenerative findings.  Visualized lung parenchyma clear.  Impression: As above    Electronically signed by: John Gutierrez MD  Date:    04/13/2020  Time:    08:44      Relevant imaging results reviewed and interpreted by me, discussed with the patient and / or family today.     Other Tests:     No other tests performed today.    Assessment:  Josh Simental is a 45 y.o. female with left s houlder adhesive capsulitis    Encounter Diagnosis   Name Primary?    Adhesive capsulitis of left shoulder Yes        Plan:  · Schedule for US glenohumeral injection this Wed  · PT with [facility] @ [facility], start next week  · Follow-up 6 weeks  · Continue Naproxen & Tylenol   Treatment plan was developed with input from the patient/family, and they expressed understanding and agreement with the plan. All questions were answered today.    Follow-up: for US left shoulder GH injection or sooner if there are any problems between now and then.    Disclaimer: This note was prepared using a voice recognition system and is likely to have sound alike errors within the text.

## 2020-05-11 NOTE — PROGRESS NOTES
Faxed referral to [facility] in [place] at Fax# [phone] with confirmation received, AL, LPN.

## 2020-05-13 ENCOUNTER — PROCEDURE VISIT (OUTPATIENT)
Dept: ORTHOPEDICS | Facility: CLINIC | Age: 45
End: 2020-05-13
Payer: COMMERCIAL

## 2020-05-13 VITALS
BODY MASS INDEX: 32.49 KG/M2 | WEIGHT: 195 LBS | DIASTOLIC BLOOD PRESSURE: 75 MMHG | SYSTOLIC BLOOD PRESSURE: 114 MMHG | HEIGHT: 65 IN | HEART RATE: 74 BPM

## 2020-05-13 DIAGNOSIS — M75.02 ADHESIVE CAPSULITIS OF LEFT SHOULDER: Primary | ICD-10-CM

## 2020-05-13 PROCEDURE — 20611 LARGE JOINT ASPIRATION/INJECTION: L GLENOHUMERAL: ICD-10-PCS | Mod: LT,S$GLB,, | Performed by: ORTHOPAEDIC SURGERY

## 2020-05-13 PROCEDURE — 99499 UNLISTED E&M SERVICE: CPT | Mod: S$GLB,,, | Performed by: ORTHOPAEDIC SURGERY

## 2020-05-13 PROCEDURE — 20611 DRAIN/INJ JOINT/BURSA W/US: CPT | Mod: LT,S$GLB,, | Performed by: ORTHOPAEDIC SURGERY

## 2020-05-13 PROCEDURE — 99499 NO LOS: ICD-10-PCS | Mod: S$GLB,,, | Performed by: ORTHOPAEDIC SURGERY

## 2020-05-13 RX ADMIN — METHYLPREDNISOLONE ACETATE 80 MG: 80 INJECTION, SUSPENSION INTRA-ARTICULAR; INTRALESIONAL; INTRAMUSCULAR; SOFT TISSUE at 10:05

## 2020-05-15 ENCOUNTER — TELEPHONE (OUTPATIENT)
Dept: ORTHOPEDICS | Facility: CLINIC | Age: 45
End: 2020-05-15

## 2020-05-19 ENCOUNTER — TELEPHONE (OUTPATIENT)
Dept: ORTHOPEDICS | Facility: CLINIC | Age: 45
End: 2020-05-19

## 2020-05-21 RX ORDER — METHYLPREDNISOLONE ACETATE 80 MG/ML
80 INJECTION, SUSPENSION INTRA-ARTICULAR; INTRALESIONAL; INTRAMUSCULAR; SOFT TISSUE
Status: DISCONTINUED | OUTPATIENT
Start: 2020-05-13 | End: 2020-05-21 | Stop reason: HOSPADM

## 2020-05-21 NOTE — PROCEDURES
Large Joint Aspiration/Injection: L glenohumeral  Date/Time: 5/13/2020 10:20 AM  Performed by: Lukas Jara MD  Authorized by: Lukas Jara MD     Consent Done?:  Yes (Verbal)  Indications:  Diagnostic evaluation and pain  Site marked: the procedure site was marked    Timeout: prior to procedure the correct patient, procedure, and site was verified    Prep: patient was prepped and draped in usual sterile fashion      Local anesthesia used?: Yes    Local anesthetic:  Bupivacaine 0.5% without epinephrine, lidocaine 1% without epinephrine and topical anesthetic    Details:  Needle Size:  20 G  Ultrasonic Guidance for needle placement?: Yes    Images are saved and documented.  Approach:  Posterior  Location:  Shoulder  Site:  L glenohumeral  Medications:  80 mg methylPREDNISolone acetate 80 mg/mL  Patient tolerance:  Patient tolerated the procedure well with no immediate complications     Ultrasound Guided  2cc 1% lidocaine plain, 2cc 0.5% marcaine plain, 1cc 80mg methylprednisolone    ULTRASOUND NOTE: Due to the complex nature of the anatomy, and the need to insure the needle was placed precisely at the desired anatomical location, this procedure was performed with the assistance of ultrasound guidance. Imaging demonstrating needle trajectory and placement was saved on the ultrasound device for documentation purposes.

## 2020-05-22 ENCOUNTER — PATIENT OUTREACH (OUTPATIENT)
Dept: ADMINISTRATIVE | Facility: HOSPITAL | Age: 45
End: 2020-05-22

## 2020-05-22 NOTE — LETTER
June 5, 2020      We are seeing Josh Simental, 1975, at [facility]. Arminda Mace MD is their primary care physician. To help with our [place] maintenance records could you please send the following:     Mammogram     Please fax to [facility] at [phone], attention Albania Shields.      Thank-you in advance for your assistance. If you have any questions or concerns please contact me at [phone].     Albania Shields MA  Care Coordination Department  [facility]   [phone]

## 2020-05-22 NOTE — LETTER
May 22, 2020      We are seeing Josh Simental, 1975, at [facility]. Arminda Mace MD is their primary care physician. To help with our [place] maintenance records could you please send the following:     Mammogram    Please fax to [facility] at [phone], attention Albania Shields.      Thank-you in advance for your assistance. If you have any questions or concerns please contact me at [phone].     Albania Shields MA  Care Coordination Department  [facility]

## 2020-05-22 NOTE — LETTER
May 29, 2020      We are seeing Josh Simental, 1975, at [facility]. Arminda Mace MD is their primary care physician. To help with our [place] maintenance records could you please send the following:     Mammogram     Please fax to [facility] at [phone], attention Albania Shields.      Thank-you in advance for your assistance. If you have any questions or concerns please contact me at [phone].     Albania Shields MA  Care Coordination Department  [facility]   [phone]

## 2020-06-22 ENCOUNTER — PATIENT OUTREACH (OUTPATIENT)
Dept: ADMINISTRATIVE | Facility: OTHER | Age: 45
End: 2020-06-22

## 2020-06-22 ENCOUNTER — OFFICE VISIT (OUTPATIENT)
Dept: ORTHOPEDICS | Facility: CLINIC | Age: 45
End: 2020-06-22
Payer: COMMERCIAL

## 2020-06-22 ENCOUNTER — TELEPHONE (OUTPATIENT)
Dept: FAMILY MEDICINE | Facility: CLINIC | Age: 45
End: 2020-06-22

## 2020-06-22 VITALS
HEIGHT: 65 IN | WEIGHT: 195 LBS | SYSTOLIC BLOOD PRESSURE: 103 MMHG | HEART RATE: 77 BPM | BODY MASS INDEX: 32.49 KG/M2 | DIASTOLIC BLOOD PRESSURE: 71 MMHG

## 2020-06-22 DIAGNOSIS — M75.02 ADHESIVE CAPSULITIS OF LEFT SHOULDER: Primary | ICD-10-CM

## 2020-06-22 PROCEDURE — 3008F BODY MASS INDEX DOCD: CPT | Mod: CPTII,S$GLB,, | Performed by: ORTHOPAEDIC SURGERY

## 2020-06-22 PROCEDURE — 3008F PR BODY MASS INDEX (BMI) DOCUMENTED: ICD-10-PCS | Mod: CPTII,S$GLB,, | Performed by: ORTHOPAEDIC SURGERY

## 2020-06-22 PROCEDURE — 99999 PR PBB SHADOW E&M-EST. PATIENT-LVL III: CPT | Mod: PBBFAC,,, | Performed by: ORTHOPAEDIC SURGERY

## 2020-06-22 PROCEDURE — 99999 PR PBB SHADOW E&M-EST. PATIENT-LVL III: ICD-10-PCS | Mod: PBBFAC,,, | Performed by: ORTHOPAEDIC SURGERY

## 2020-06-22 PROCEDURE — 99214 OFFICE O/P EST MOD 30 MIN: CPT | Mod: S$GLB,,, | Performed by: ORTHOPAEDIC SURGERY

## 2020-06-22 PROCEDURE — 99214 PR OFFICE/OUTPT VISIT, EST, LEVL IV, 30-39 MIN: ICD-10-PCS | Mod: S$GLB,,, | Performed by: ORTHOPAEDIC SURGERY

## 2020-06-22 RX ORDER — NAPROXEN 500 MG/1
500 TABLET ORAL 2 TIMES DAILY
Qty: 60 TABLET | Refills: 2 | Status: CANCELLED | OUTPATIENT
Start: 2020-06-22

## 2020-06-22 RX ORDER — NAPROXEN 500 MG/1
500 TABLET ORAL 2 TIMES DAILY
Qty: 60 TABLET | Refills: 2 | Status: SHIPPED | OUTPATIENT
Start: 2020-06-22 | End: 2020-06-22

## 2020-06-22 NOTE — PATIENT INSTRUCTIONS
Assessment:  Josh Simental is a 45 y.o. RHD female  for , with left shoulder adhesive capsulitis          Encounter Diagnosis   Name Primary?    Adhesive capsulitis of left shoulder Yes         Plan:   Continue PT - recommend doing in person visits as much as possbile but stretch every day with home system   Naproxen 500mg BID prn pain   Defer MRI today, may consider if no improvement   May consider biceps injection if no improvement   Follow-up with PCP for routine labwork    Follow-up: 6 weeks or sooner if there are any problems between now and then.    Thank you for choosing [facility] and Dr. Lukas Jara for your orthopedic & sports medicine care. It is our goal to provide you with exceptional care that will help keep you healthy, active, and get you back in the game.    If you felt that you received exemplary care today, please consider leaving us feedback on [employer] at [email or web address]     Please do not hesitate to reach out to us via email, phone, or MyChart with any questions, concerns, or feedback.    If you are experiencing pain/discomfort ,or have questions after 5pm and would like to be connected to the [facility] on-call team, please call this number and specify which Sports Medicine provider is treating you: [phone]

## 2020-06-22 NOTE — TELEPHONE ENCOUNTER
----- Message from Kandis Gutierrez sent at 6/22/2020 10:28 AM CDT -----  Contact: [facility]   Pharmacy was requesting to change naproxen (EC NAPROSYN) 500 MG EC tablet to the regular, due to brand is on back order and will most likely be discontinued. Please advise

## 2020-06-22 NOTE — PROGRESS NOTES
Patient ID: Josh Simental  YOB: 1975  MRN: [id number]    Chief Complaint: Pain of the Left Shoulder    Referred By: Dr. Maritza Eagle     History of Present Illness: Josh Simental is a right-hand dominant 45 y.o. female . She is here today for a follow up of her left shoulder. She had left shoulder glenohumeral injection on 05/13/2020. She states that she got a little relief from the injection, about 4 or 5 weeks. She is still in physical therapy although she states that things have gotten more difficult. She still has very limited ROM. She states her pain is 4/10 today. She also has pain with lifting, pushing and pulling.    Previous 05/11/2020 History of Present Illness: Josh Simental is a right-hand dominant 45 y.o. female  , here for a follow up of her left shoulder. She has a hx of biceps tendonitis which started about a year ago, she then did physical therapy at [facility] which she had significant benefit. She has noticed pain in the left shoulder that started about 3 months. Notices pain reaching and lifting . She has noticed weakness and limited range of motion. She tried Voltaren gel, naproxen, tylenol, and physical therapy with Santana Sanders([facility]). She states that she is unable to complete all the exercises due to pain. Denies fevers, chills, night sweats, numbness, and tingling.      Shoulder Pain   The pain is present in the left shoulder. This is a chronic problem. The problem has been unchanged. The quality of the pain is described as aching, dull and sharp (stabbing). The pain is at a severity of 4/10. Associated symptoms include a limited range of motion and stiffness. Pertinent negatives include no fever, itching, joint locking or joint swelling. The symptoms are aggravated by activity, lifting and lying down. She has tried NSAIDs, injection treatment and cold for the symptoms. The treatment  provided no relief. Physical therapy was effective.      Past Medical History:   Past Medical History:   Diagnosis Date    Allergy     Anxiety     Depression     Migraines     Sinusitis      Past Surgical History:   Procedure Laterality Date    CHOLECYSTECTOMY  2017    EYE SURGERY      GALLBLADDER SURGERY  2017    TUBAL LIGATION       Family History   Problem Relation Age of Onset    Diabetes Mother     Thyroid disease Mother     Diabetes Father     Cancer Father         Renal     Cancer Maternal Grandmother         Cervical     Diabetes Maternal Grandfather     Cancer Maternal Grandfather         Prostate    Stroke Paternal Grandmother      Social History     Socioeconomic History    Marital status:      Spouse name: Not on file    Number of children: Not on file    Years of education: Not on file    Highest education level: Not on file   Occupational History    Not on file   Social Needs    Financial resource strain: Not hard at all    Food insecurity     Worry: Never true     Inability: Never true    Transportation needs     Medical: No     Non-medical: No   Tobacco Use    Smoking status: Former Smoker     Packs/day: 0.25     Years: 2.00     Pack years: 0.50     Types: Cigarettes     Quit date: 1999     Years since quittin.0    Smokeless tobacco: Never Used    Tobacco comment: maybe 1 pk a week    Substance and Sexual Activity    Alcohol use: No     Frequency: Never     Drinks per session: Patient refused     Binge frequency: Never    Drug use: No    Sexual activity: Yes     Partners: Male     Birth control/protection: None   Lifestyle    Physical activity     Days per week: 2 days     Minutes per session: 40 min    Stress: To some extent   Relationships    Social connections     Talks on phone: More than three times a week     Gets together: Once a week     Attends Mandaeism service: Not on file     Active member of club or organization: Yes     Attends  meetings of clubs or organizations: More than 4 times per year     Relationship status:    Other Topics Concern    Not on file   Social History Narrative    Not on file     Medication List with Changes/Refills   Current Medications    BLOOD SUGAR DIAGNOSTIC STRP    To check BG 2 times daily, to use with insurance preferred meter    BLOOD-GLUCOSE METER KIT    To check BG 2 times daily, to use with insurance preferred meter    DICLOFENAC SODIUM (VOLTAREN) 1 % GEL    Apply 2 g topically 4 (four) times daily.    LACTOBACILLUS ACIDOPHILUS (PROBIOTIC ORAL)    Take 1 tablet by mouth once daily.    LANCETS MISC    To check BG 2 times daily, to use with insurance preferred meter    METFORMIN (GLUCOPHAGE-XR) 500 MG 24 HR TABLET    Take 1 tablet (500 mg total) by mouth daily with breakfast.    NAPROXEN (EC NAPROSYN) 500 MG EC TABLET    Take 1 tablet (500 mg total) by mouth 2 (two) times daily as needed.    VITAMIN E 400 UNIT CAPSULE    Take 1 capsule by mouth once daily.     Review of patient's allergies indicates:   Allergen Reactions    No known drug allergies      Review of Systems   Constitution: Negative for fever.   HENT: Negative for sore throat.    Eyes: Negative for blurred vision.   Cardiovascular: Negative for dyspnea on exertion.   Respiratory: Negative for shortness of breath.    Hematologic/Lymphatic: Does not bruise/bleed easily.   Skin: Negative for itching.   Musculoskeletal: Positive for joint pain, muscle cramps, muscle weakness and stiffness. Negative for falls and joint swelling.   Gastrointestinal: Negative for vomiting.   Genitourinary: Negative for dysuria.   Neurological: Negative for dizziness and loss of balance.   Psychiatric/Behavioral: The patient does not have insomnia.        Physical Exam:   Body mass index is 32.45 kg/m².  Vitals:    06/22/20 0852   BP: 103/71   Pulse: 77      GENERAL: Well appearing, appropriate for stated age, no acute distress.  CARDIOVASCULAR: Pulses regular by  peripheral palpation.  PULMONARY: Respirations are even and non-labored.  NEURO: Awake, alert, and oriented x 3.  PSYCH: Mood & affect are appropriate.  HEENT: Head is normocephalic and atraumatic.          Right Shoulder Exam   Right shoulder exam is normal.    Range of Motion   Forward Elevation: 170  External Rotation 0 degrees: 60   Internal rotation 0 degrees: T9     Left Shoulder Exam     Inspection/Observation   Swelling: absent  Bruising: absent  Atrophy: absent    Tenderness   The patient is tender to palpation of the biceps tendon.    Range of Motion   Active abduction: 50   Passive abduction: 60   Forward Elevation: 80  External Rotation 0 degrees: 20       Muscle Strength   Right Upper Extremity   Shoulder Abduction: 5/5   Shoulder Internal Rotation: 5/5   Shoulder External Rotation: 5/5   Left Upper Extremity  Shoulder Abduction: 4/5   Shoulder Internal Rotation: 4/5   Shoulder External Rotation: 4/5     Imaging:    X-Ray Shoulder 2 or More Views Left  Narrative: EXAMINATION:  XR SHOULDER COMPLETE 2 OR MORE VIEWS LEFT    CLINICAL HISTORY:  Pain in left shoulder    TECHNIQUE:  Two or three views of the left shoulder were performed.    COMPARISON:  None    FINDINGS:  No acute osseous abnormality without significant degenerative findings.  Visualized lung parenchyma clear.  Impression: As above    Electronically signed by: John Gutierrez MD  Date:    04/13/2020  Time:    08:44    Relevant imaging results reviewed and interpreted by me, discussed with the patient and / or family today.     Other Tests:     No other tests performed today.    Assessment:  Josh Thompsonbibi Simental is a 45 y.o. RHD female  for , with left shoulder adhesive capsulitis          Encounter Diagnosis   Name Primary?    Adhesive capsulitis of left shoulder Yes      Plan:   Continue PT - recommend doing in person visits as much as possbile but stretch every day with home exercise program   Naproxen  500mg BID prn pain   After a thorough discussion we will defer MRI today, may consider if no improvement.   May consider biceps injection if no improvement.  She does have tenderness and pain in this region although certainly her he has capsulitis is causing her stiffness and is of more concern from a functional standpoint currently.   Follow-up with PCP for routine labwork and assessment.  She does describe some vague hormone changes recently and thinks that she is having some early pre menopausal type symptoms as well.  This could certainly contribute to her recalcitrant adhesive capsulitis.    Follow-up: 6 weeks or sooner if there are any problems between now and then.    Disclaimer: This note was prepared using a voice recognition system and is likely to have sound alike errors within the text.

## 2020-10-01 RX ORDER — METFORMIN HYDROCHLORIDE 500 MG/1
TABLET, EXTENDED RELEASE ORAL
Qty: 90 TABLET | Refills: 3 | Status: SHIPPED | OUTPATIENT
Start: 2020-10-01 | End: 2021-05-19 | Stop reason: SDUPTHER

## 2021-03-17 ENCOUNTER — OFFICE VISIT (OUTPATIENT)
Dept: DERMATOLOGY | Facility: CLINIC | Age: 46
End: 2021-03-17
Attending: FAMILY MEDICINE
Payer: COMMERCIAL

## 2021-03-17 DIAGNOSIS — D18.00 HEMANGIOMA, UNSPECIFIED SITE: ICD-10-CM

## 2021-03-17 DIAGNOSIS — L65.9 ALOPECIA: Primary | ICD-10-CM

## 2021-03-17 PROCEDURE — 99203 PR OFFICE/OUTPT VISIT, NEW, LEVL III, 30-44 MIN: ICD-10-PCS | Mod: S$GLB,,, | Performed by: DERMATOLOGY

## 2021-03-17 PROCEDURE — 99999 PR PBB SHADOW E&M-EST. PATIENT-LVL III: CPT | Mod: PBBFAC,,, | Performed by: DERMATOLOGY

## 2021-03-17 PROCEDURE — 1126F AMNT PAIN NOTED NONE PRSNT: CPT | Mod: S$GLB,,, | Performed by: DERMATOLOGY

## 2021-03-17 PROCEDURE — 99203 OFFICE O/P NEW LOW 30 MIN: CPT | Mod: S$GLB,,, | Performed by: DERMATOLOGY

## 2021-03-17 PROCEDURE — 1126F PR PAIN SEVERITY QUANTIFIED, NO PAIN PRESENT: ICD-10-PCS | Mod: S$GLB,,, | Performed by: DERMATOLOGY

## 2021-03-17 PROCEDURE — 99999 PR PBB SHADOW E&M-EST. PATIENT-LVL III: ICD-10-PCS | Mod: PBBFAC,,, | Performed by: DERMATOLOGY

## 2021-03-25 ENCOUNTER — PATIENT MESSAGE (OUTPATIENT)
Dept: ADMINISTRATIVE | Facility: HOSPITAL | Age: 46
End: 2021-03-25

## 2021-04-29 ENCOUNTER — PATIENT MESSAGE (OUTPATIENT)
Dept: RESEARCH | Facility: HOSPITAL | Age: 46
End: 2021-04-29

## 2021-04-30 DIAGNOSIS — Z12.31 OTHER SCREENING MAMMOGRAM: ICD-10-CM

## 2021-05-19 ENCOUNTER — OFFICE VISIT (OUTPATIENT)
Dept: FAMILY MEDICINE | Facility: CLINIC | Age: 46
End: 2021-05-19
Attending: FAMILY MEDICINE
Payer: COMMERCIAL

## 2021-05-19 VITALS
OXYGEN SATURATION: 99 % | HEIGHT: 65 IN | HEART RATE: 78 BPM | TEMPERATURE: 98 F | SYSTOLIC BLOOD PRESSURE: 120 MMHG | DIASTOLIC BLOOD PRESSURE: 74 MMHG | BODY MASS INDEX: 32.87 KG/M2 | WEIGHT: 197.31 LBS

## 2021-05-19 DIAGNOSIS — Z12.11 COLON CANCER SCREENING: ICD-10-CM

## 2021-05-19 DIAGNOSIS — I88.9 LYMPHADENITIS: ICD-10-CM

## 2021-05-19 DIAGNOSIS — E88.819 INSULIN RESISTANCE: Primary | ICD-10-CM

## 2021-05-19 PROCEDURE — 1125F AMNT PAIN NOTED PAIN PRSNT: CPT | Mod: S$GLB,,, | Performed by: FAMILY MEDICINE

## 2021-05-19 PROCEDURE — 3008F BODY MASS INDEX DOCD: CPT | Mod: CPTII,S$GLB,, | Performed by: FAMILY MEDICINE

## 2021-05-19 PROCEDURE — 99999 PR PBB SHADOW E&M-EST. PATIENT-LVL IV: ICD-10-PCS | Mod: PBBFAC,,, | Performed by: FAMILY MEDICINE

## 2021-05-19 PROCEDURE — 1125F PR PAIN SEVERITY QUANTIFIED, PAIN PRESENT: ICD-10-PCS | Mod: S$GLB,,, | Performed by: FAMILY MEDICINE

## 2021-05-19 PROCEDURE — 99396 PREV VISIT EST AGE 40-64: CPT | Mod: S$GLB,,, | Performed by: FAMILY MEDICINE

## 2021-05-19 PROCEDURE — 99396 PR PREVENTIVE VISIT,EST,40-64: ICD-10-PCS | Mod: S$GLB,,, | Performed by: FAMILY MEDICINE

## 2021-05-19 PROCEDURE — 3008F PR BODY MASS INDEX (BMI) DOCUMENTED: ICD-10-PCS | Mod: CPTII,S$GLB,, | Performed by: FAMILY MEDICINE

## 2021-05-19 PROCEDURE — 99999 PR PBB SHADOW E&M-EST. PATIENT-LVL IV: CPT | Mod: PBBFAC,,, | Performed by: FAMILY MEDICINE

## 2021-05-19 RX ORDER — SULFAMETHOXAZOLE AND TRIMETHOPRIM 800; 160 MG/1; MG/1
1 TABLET ORAL 2 TIMES DAILY
Qty: 14 TABLET | Refills: 0 | Status: SHIPPED | OUTPATIENT
Start: 2021-05-19 | End: 2021-12-07

## 2021-05-19 RX ORDER — METFORMIN HYDROCHLORIDE 500 MG/1
500 TABLET, EXTENDED RELEASE ORAL
Qty: 90 TABLET | Refills: 3 | Status: SHIPPED | OUTPATIENT
Start: 2021-05-19 | End: 2022-07-22 | Stop reason: SDUPTHER

## 2021-05-28 ENCOUNTER — PATIENT OUTREACH (OUTPATIENT)
Dept: ADMINISTRATIVE | Facility: HOSPITAL | Age: 46
End: 2021-05-28

## 2021-06-08 LAB — NONINV COLON CA DNA+OCC BLD SCRN STL QL: NEGATIVE

## 2021-06-09 ENCOUNTER — LAB VISIT (OUTPATIENT)
Dept: LAB | Facility: HOSPITAL | Age: 46
End: 2021-06-09
Attending: FAMILY MEDICINE
Payer: COMMERCIAL

## 2021-06-09 DIAGNOSIS — E88.819 INSULIN RESISTANCE: ICD-10-CM

## 2021-06-09 LAB
ALBUMIN SERPL BCP-MCNC: 4 G/DL (ref 3.5–5.2)
ALP SERPL-CCNC: 81 U/L (ref 55–135)
ALT SERPL W/O P-5'-P-CCNC: 18 U/L (ref 10–44)
ANION GAP SERPL CALC-SCNC: 14 MMOL/L (ref 8–16)
AST SERPL-CCNC: 19 U/L (ref 10–40)
BASOPHILS # BLD AUTO: 0.05 K/UL (ref 0–0.2)
BASOPHILS NFR BLD: 0.9 % (ref 0–1.9)
BILIRUB SERPL-MCNC: 0.5 MG/DL (ref 0.1–1)
BUN SERPL-MCNC: 11 MG/DL (ref 6–20)
CALCIUM SERPL-MCNC: 10 MG/DL (ref 8.7–10.5)
CHLORIDE SERPL-SCNC: 106 MMOL/L (ref 95–110)
CHOLEST SERPL-MCNC: 214 MG/DL (ref 120–199)
CHOLEST/HDLC SERPL: 3.1 {RATIO} (ref 2–5)
CO2 SERPL-SCNC: 22 MMOL/L (ref 23–29)
CREAT SERPL-MCNC: 0.8 MG/DL (ref 0.5–1.4)
DIFFERENTIAL METHOD: ABNORMAL
EOSINOPHIL # BLD AUTO: 0.1 K/UL (ref 0–0.5)
EOSINOPHIL NFR BLD: 1.4 % (ref 0–8)
ERYTHROCYTE [DISTWIDTH] IN BLOOD BY AUTOMATED COUNT: 13.3 % (ref 11.5–14.5)
EST. GFR  (AFRICAN AMERICAN): >60 ML/MIN/1.73 M^2
EST. GFR  (NON AFRICAN AMERICAN): >60 ML/MIN/1.73 M^2
ESTIMATED AVG GLUCOSE: 105 MG/DL (ref 68–131)
GLUCOSE SERPL-MCNC: 93 MG/DL (ref 70–110)
HBA1C MFR BLD: 5.3 % (ref 4–5.6)
HCT VFR BLD AUTO: 41 % (ref 37–48.5)
HDLC SERPL-MCNC: 70 MG/DL (ref 40–75)
HDLC SERPL: 32.7 % (ref 20–50)
HGB BLD-MCNC: 12.5 G/DL (ref 12–16)
IMM GRANULOCYTES # BLD AUTO: 0.01 K/UL (ref 0–0.04)
IMM GRANULOCYTES NFR BLD AUTO: 0.2 % (ref 0–0.5)
LDLC SERPL CALC-MCNC: 115.4 MG/DL (ref 63–159)
LYMPHOCYTES # BLD AUTO: 1.7 K/UL (ref 1–4.8)
LYMPHOCYTES NFR BLD: 31.4 % (ref 18–48)
MCH RBC QN AUTO: 28.3 PG (ref 27–31)
MCHC RBC AUTO-ENTMCNC: 30.5 G/DL (ref 32–36)
MCV RBC AUTO: 93 FL (ref 82–98)
MONOCYTES # BLD AUTO: 0.4 K/UL (ref 0.3–1)
MONOCYTES NFR BLD: 7.4 % (ref 4–15)
NEUTROPHILS # BLD AUTO: 3.3 K/UL (ref 1.8–7.7)
NEUTROPHILS NFR BLD: 58.7 % (ref 38–73)
NONHDLC SERPL-MCNC: 144 MG/DL
NRBC BLD-RTO: 0 /100 WBC
PLATELET # BLD AUTO: 346 K/UL (ref 150–450)
PMV BLD AUTO: 9.9 FL (ref 9.2–12.9)
POTASSIUM SERPL-SCNC: 4.2 MMOL/L (ref 3.5–5.1)
PROT SERPL-MCNC: 7.4 G/DL (ref 6–8.4)
RBC # BLD AUTO: 4.41 M/UL (ref 4–5.4)
SODIUM SERPL-SCNC: 142 MMOL/L (ref 136–145)
TRIGL SERPL-MCNC: 143 MG/DL (ref 30–150)
WBC # BLD AUTO: 5.54 K/UL (ref 3.9–12.7)

## 2021-06-09 PROCEDURE — 80061 LIPID PANEL: CPT | Performed by: FAMILY MEDICINE

## 2021-06-09 PROCEDURE — 83036 HEMOGLOBIN GLYCOSYLATED A1C: CPT | Performed by: FAMILY MEDICINE

## 2021-06-09 PROCEDURE — 36415 COLL VENOUS BLD VENIPUNCTURE: CPT | Mod: PO | Performed by: FAMILY MEDICINE

## 2021-06-09 PROCEDURE — 85025 COMPLETE CBC W/AUTO DIFF WBC: CPT | Performed by: FAMILY MEDICINE

## 2021-06-09 PROCEDURE — 80053 COMPREHEN METABOLIC PANEL: CPT | Performed by: FAMILY MEDICINE

## 2021-12-07 ENCOUNTER — LAB VISIT (OUTPATIENT)
Dept: LAB | Facility: HOSPITAL | Age: 46
End: 2021-12-07
Attending: FAMILY MEDICINE
Payer: COMMERCIAL

## 2021-12-07 ENCOUNTER — OFFICE VISIT (OUTPATIENT)
Dept: FAMILY MEDICINE | Facility: CLINIC | Age: 46
End: 2021-12-07
Attending: FAMILY MEDICINE
Payer: COMMERCIAL

## 2021-12-07 VITALS
WEIGHT: 199.5 LBS | DIASTOLIC BLOOD PRESSURE: 72 MMHG | TEMPERATURE: 98 F | HEIGHT: 65 IN | HEART RATE: 72 BPM | SYSTOLIC BLOOD PRESSURE: 122 MMHG | OXYGEN SATURATION: 99 % | BODY MASS INDEX: 33.24 KG/M2

## 2021-12-07 DIAGNOSIS — M62.89 MUSCLE FATIGUE: Primary | ICD-10-CM

## 2021-12-07 DIAGNOSIS — R35.89 POLYURIA: ICD-10-CM

## 2021-12-07 DIAGNOSIS — M62.89 MUSCLE FATIGUE: ICD-10-CM

## 2021-12-07 LAB
ALBUMIN SERPL BCP-MCNC: 4 G/DL (ref 3.5–5.2)
ALP SERPL-CCNC: 81 U/L (ref 55–135)
ALT SERPL W/O P-5'-P-CCNC: 16 U/L (ref 10–44)
ANION GAP SERPL CALC-SCNC: 13 MMOL/L (ref 8–16)
AST SERPL-CCNC: 13 U/L (ref 10–40)
BACTERIA #/AREA URNS AUTO: NORMAL /HPF
BASOPHILS # BLD AUTO: 0.05 K/UL (ref 0–0.2)
BASOPHILS NFR BLD: 0.7 % (ref 0–1.9)
BILIRUB SERPL-MCNC: 0.3 MG/DL (ref 0.1–1)
BUN SERPL-MCNC: 14 MG/DL (ref 6–20)
CALCIUM SERPL-MCNC: 10.1 MG/DL (ref 8.7–10.5)
CHLORIDE SERPL-SCNC: 103 MMOL/L (ref 95–110)
CK SERPL-CCNC: 56 U/L (ref 20–180)
CO2 SERPL-SCNC: 25 MMOL/L (ref 23–29)
CREAT SERPL-MCNC: 0.8 MG/DL (ref 0.5–1.4)
CRP SERPL-MCNC: 3.5 MG/L (ref 0–8.2)
DIFFERENTIAL METHOD: ABNORMAL
EOSINOPHIL # BLD AUTO: 0.1 K/UL (ref 0–0.5)
EOSINOPHIL NFR BLD: 1.2 % (ref 0–8)
ERYTHROCYTE [DISTWIDTH] IN BLOOD BY AUTOMATED COUNT: 12.2 % (ref 11.5–14.5)
EST. GFR  (AFRICAN AMERICAN): >60 ML/MIN/1.73 M^2
EST. GFR  (NON AFRICAN AMERICAN): >60 ML/MIN/1.73 M^2
ESTIMATED AVG GLUCOSE: 114 MG/DL (ref 68–131)
GLUCOSE SERPL-MCNC: 85 MG/DL (ref 70–110)
HBA1C MFR BLD: 5.6 % (ref 4–5.6)
HCT VFR BLD AUTO: 40.9 % (ref 37–48.5)
HGB BLD-MCNC: 12.8 G/DL (ref 12–16)
IMM GRANULOCYTES # BLD AUTO: 0.01 K/UL (ref 0–0.04)
IMM GRANULOCYTES NFR BLD AUTO: 0.1 % (ref 0–0.5)
LYMPHOCYTES # BLD AUTO: 2.6 K/UL (ref 1–4.8)
LYMPHOCYTES NFR BLD: 37 % (ref 18–48)
MCH RBC QN AUTO: 28.8 PG (ref 27–31)
MCHC RBC AUTO-ENTMCNC: 31.3 G/DL (ref 32–36)
MCV RBC AUTO: 92 FL (ref 82–98)
MICROSCOPIC COMMENT: NORMAL
MONOCYTES # BLD AUTO: 0.5 K/UL (ref 0.3–1)
MONOCYTES NFR BLD: 7.7 % (ref 4–15)
NEUTROPHILS # BLD AUTO: 3.7 K/UL (ref 1.8–7.7)
NEUTROPHILS NFR BLD: 53.3 % (ref 38–73)
NRBC BLD-RTO: 0 /100 WBC
PLATELET # BLD AUTO: 303 K/UL (ref 150–450)
PMV BLD AUTO: 10.1 FL (ref 9.2–12.9)
POTASSIUM SERPL-SCNC: 4.4 MMOL/L (ref 3.5–5.1)
PROT SERPL-MCNC: 7.1 G/DL (ref 6–8.4)
RBC # BLD AUTO: 4.45 M/UL (ref 4–5.4)
RBC #/AREA URNS AUTO: 1 /HPF (ref 0–4)
SODIUM SERPL-SCNC: 141 MMOL/L (ref 136–145)
TSH SERPL DL<=0.005 MIU/L-ACNC: 2.01 UIU/ML (ref 0.4–4)
WBC # BLD AUTO: 6.91 K/UL (ref 3.9–12.7)
WBC #/AREA URNS AUTO: 1 /HPF (ref 0–5)

## 2021-12-07 PROCEDURE — 99999 PR PBB SHADOW E&M-EST. PATIENT-LVL III: ICD-10-PCS | Mod: PBBFAC,,, | Performed by: FAMILY MEDICINE

## 2021-12-07 PROCEDURE — 82550 ASSAY OF CK (CPK): CPT | Performed by: FAMILY MEDICINE

## 2021-12-07 PROCEDURE — 83036 HEMOGLOBIN GLYCOSYLATED A1C: CPT | Performed by: FAMILY MEDICINE

## 2021-12-07 PROCEDURE — 36415 COLL VENOUS BLD VENIPUNCTURE: CPT | Mod: PO | Performed by: FAMILY MEDICINE

## 2021-12-07 PROCEDURE — 84443 ASSAY THYROID STIM HORMONE: CPT | Performed by: FAMILY MEDICINE

## 2021-12-07 PROCEDURE — 99999 PR PBB SHADOW E&M-EST. PATIENT-LVL III: CPT | Mod: PBBFAC,,, | Performed by: FAMILY MEDICINE

## 2021-12-07 PROCEDURE — 81001 URINALYSIS AUTO W/SCOPE: CPT | Performed by: FAMILY MEDICINE

## 2021-12-07 PROCEDURE — 99214 PR OFFICE/OUTPT VISIT, EST, LEVL IV, 30-39 MIN: ICD-10-PCS | Mod: S$GLB,,, | Performed by: FAMILY MEDICINE

## 2021-12-07 PROCEDURE — 85025 COMPLETE CBC W/AUTO DIFF WBC: CPT | Performed by: FAMILY MEDICINE

## 2021-12-07 PROCEDURE — 99214 OFFICE O/P EST MOD 30 MIN: CPT | Mod: S$GLB,,, | Performed by: FAMILY MEDICINE

## 2021-12-07 PROCEDURE — 86140 C-REACTIVE PROTEIN: CPT | Performed by: FAMILY MEDICINE

## 2021-12-07 PROCEDURE — 80053 COMPREHEN METABOLIC PANEL: CPT | Performed by: FAMILY MEDICINE

## 2022-01-17 ENCOUNTER — PATIENT MESSAGE (OUTPATIENT)
Dept: FAMILY MEDICINE | Facility: CLINIC | Age: 47
End: 2022-01-17
Payer: COMMERCIAL

## 2022-01-17 ENCOUNTER — PATIENT MESSAGE (OUTPATIENT)
Dept: ADMINISTRATIVE | Facility: OTHER | Age: 47
End: 2022-01-17
Payer: COMMERCIAL

## 2022-01-19 ENCOUNTER — OFFICE VISIT (OUTPATIENT)
Dept: FAMILY MEDICINE | Facility: CLINIC | Age: 47
End: 2022-01-19
Payer: COMMERCIAL

## 2022-01-19 ENCOUNTER — PATIENT MESSAGE (OUTPATIENT)
Dept: FAMILY MEDICINE | Facility: CLINIC | Age: 47
End: 2022-01-19

## 2022-01-19 DIAGNOSIS — U07.1 COVID-19: Primary | ICD-10-CM

## 2022-01-19 PROCEDURE — 99213 PR OFFICE/OUTPT VISIT, EST, LEVL III, 20-29 MIN: ICD-10-PCS | Mod: 95,,, | Performed by: NURSE PRACTITIONER

## 2022-01-19 PROCEDURE — 1160F RVW MEDS BY RX/DR IN RCRD: CPT | Mod: CPTII,95,, | Performed by: NURSE PRACTITIONER

## 2022-01-19 PROCEDURE — 99213 OFFICE O/P EST LOW 20 MIN: CPT | Mod: 95,,, | Performed by: NURSE PRACTITIONER

## 2022-01-19 PROCEDURE — 1160F PR REVIEW ALL MEDS BY PRESCRIBER/CLIN PHARMACIST DOCUMENTED: ICD-10-PCS | Mod: CPTII,95,, | Performed by: NURSE PRACTITIONER

## 2022-01-19 PROCEDURE — 1159F PR MEDICATION LIST DOCUMENTED IN MEDICAL RECORD: ICD-10-PCS | Mod: CPTII,95,, | Performed by: NURSE PRACTITIONER

## 2022-01-19 PROCEDURE — 1159F MED LIST DOCD IN RCRD: CPT | Mod: CPTII,95,, | Performed by: NURSE PRACTITIONER

## 2022-01-19 RX ORDER — FEXOFENADINE HCL AND PSEUDOEPHEDRINE HCI 180; 240 MG/1; MG/1
1 TABLET, EXTENDED RELEASE ORAL DAILY
Qty: 10 TABLET | Refills: 0 | Status: SHIPPED | OUTPATIENT
Start: 2022-01-19 | End: 2022-01-29

## 2022-01-19 RX ORDER — FLUTICASONE PROPIONATE 50 MCG
2 SPRAY, SUSPENSION (ML) NASAL DAILY
Qty: 16 G | Refills: 0 | Status: SHIPPED | OUTPATIENT
Start: 2022-01-19 | End: 2022-04-28

## 2022-01-19 NOTE — PROGRESS NOTES
Subjective:       Patient ID: Josh Simental is a 46 y.o. female.    Chief Complaint: No chief complaint on file.  The patient location is: [place]   The chief complaint leading to consultation is: covid 19    Visit type: audiovisual    Face to Face time with patient: 15  15 minutes of total time spent on the encounter, which includes face to face time and non-face to face time preparing to see the patient (eg, review of tests), Obtaining and/or reviewing separately obtained history, Documenting clinical information in the electronic or other health record, Independently interpreting results (not separately reported) and communicating results to the patient/family/caregiver, or Care coordination (not separately reported).         Each patient to whom he or she provides medical services by telemedicine is:  (1) informed of the relationship between the physician and patient and the respective role of any other health care provider with respect to management of the patient; and (2) notified that he or she may decline to receive medical services by telemedicine and may withdraw from such care at any time.    Notes: Pt reports to tele medicine with chief complaint of covid 19.  Symptoms began 7 days ago.  Tested positive 3 days ago.  No fever in several days.  Not vaccinated.  Non smoker.  Notes SOB on exertion which resolves with rest.  Has not been able to monitor oxygen level.  Denies chest pain.  Taking OTC mucinex, casi seltzer ibuprofen.    Past Medical History:   Diagnosis Date    Allergy     Anxiety     Depression     Endometriosis     Migraines     Sinusitis       Cough  This is a new problem. The current episode started in the past 7 days. The problem has been unchanged. The problem occurs hourly. The cough is non-productive. Associated symptoms include chest pain, chills, ear congestion, a fever, headaches, heartburn, myalgias, nasal congestion, rhinorrhea, a sore throat and shortness of breath.  Pertinent negatives include no ear pain, hemoptysis, postnasal drip, rash, sweats, weight loss or wheezing. The symptoms are aggravated by lying down. She has tried OTC cough suppressant, body position changes and rest for the symptoms. The treatment provided mild relief. Her past medical history is significant for bronchitis and environmental allergies. There is no history of asthma, bronchiectasis, COPD, emphysema or pneumonia.     Review of Systems   Constitutional: Positive for chills and fever. Negative for weight loss.   HENT: Positive for rhinorrhea and sore throat. Negative for ear pain and postnasal drip.    Respiratory: Positive for cough and shortness of breath. Negative for hemoptysis and wheezing.    Cardiovascular: Positive for chest pain.   Gastrointestinal: Positive for heartburn.   Musculoskeletal: Positive for myalgias.   Skin: Negative for rash.   Allergic/Immunologic: Positive for environmental allergies.   Neurological: Positive for headaches.       Objective:      Physical Exam  Constitutional:       General: She is not in acute distress.     Appearance: She is ill-appearing.   HENT:      Head: Normocephalic.      Right Ear: External ear normal.      Left Ear: External ear normal.      Nose: Congestion present.   Eyes:      Extraocular Movements: Extraocular movements intact.   Pulmonary:      Effort: Pulmonary effort is normal. No respiratory distress (speaks without panting ).   Musculoskeletal:      Cervical back: Normal range of motion.   Skin:     Coloration: Skin is not jaundiced.   Neurological:      Mental Status: She is alert and oriented to person, place, and time.   Psychiatric:         Behavior: Behavior normal.         Assessment:       1. COVID-19        Plan:   COVID-19  -     fexofenadine-pseudoephedrine (ALLEGRA-D 24) 180-240 mg per 24 hr tablet; Take 1 tablet by mouth once daily. for 10 days  Dispense: 10 tablet; Refill: 0  -     fluticasone propionate (FLONASE) 50  mcg/actuation nasal spray; 2 sprays (100 mcg total) by Each Nostril route once daily.  Dispense: 16 g; Refill: 0  Symptomatic treatment discussed.  Worsening s/s discussed.  Offered CXR for dyspnea on exertion, pt will monitor oxygenation and email readings to office   1 =covid risk score     No follow-ups on file.

## 2022-02-02 ENCOUNTER — PATIENT MESSAGE (OUTPATIENT)
Dept: FAMILY MEDICINE | Facility: CLINIC | Age: 47
End: 2022-02-02
Payer: COMMERCIAL

## 2022-03-03 ENCOUNTER — TELEPHONE (OUTPATIENT)
Dept: OBSTETRICS AND GYNECOLOGY | Facility: CLINIC | Age: 47
End: 2022-03-03
Payer: COMMERCIAL

## 2022-03-03 NOTE — TELEPHONE ENCOUNTER
----- Message from Angela Schwartz sent at 3/3/2022 12:03 PM CST -----  Contact: VI WHEATLEY [[id number]]  .Type:  Needs Medical Advice    Who Called: VI WHEATLEY [[id number]]  Would the patient rather a call back or a response via [facility]? Call   Best Call Back Number: .[phone] (home)    Additional Information: Pt is req a call back in regards to seeing since she is a new pt for the provider does she have to receive a mammogram before are after her first appointment...

## 2022-03-11 ENCOUNTER — OFFICE VISIT (OUTPATIENT)
Dept: FAMILY MEDICINE | Facility: CLINIC | Age: 47
End: 2022-03-11
Payer: COMMERCIAL

## 2022-03-11 DIAGNOSIS — T14.8XXA INFECTED WOUND: Primary | ICD-10-CM

## 2022-03-11 DIAGNOSIS — L08.9 INFECTED WOUND: Primary | ICD-10-CM

## 2022-03-11 PROCEDURE — 1159F MED LIST DOCD IN RCRD: CPT | Mod: CPTII,95,, | Performed by: NURSE PRACTITIONER

## 2022-03-11 PROCEDURE — 99213 OFFICE O/P EST LOW 20 MIN: CPT | Mod: 95,,, | Performed by: NURSE PRACTITIONER

## 2022-03-11 PROCEDURE — 1159F PR MEDICATION LIST DOCUMENTED IN MEDICAL RECORD: ICD-10-PCS | Mod: CPTII,95,, | Performed by: NURSE PRACTITIONER

## 2022-03-11 PROCEDURE — 99213 PR OFFICE/OUTPT VISIT, EST, LEVL III, 20-29 MIN: ICD-10-PCS | Mod: 95,,, | Performed by: NURSE PRACTITIONER

## 2022-03-11 PROCEDURE — 1160F RVW MEDS BY RX/DR IN RCRD: CPT | Mod: CPTII,95,, | Performed by: NURSE PRACTITIONER

## 2022-03-11 PROCEDURE — 1160F PR REVIEW ALL MEDS BY PRESCRIBER/CLIN PHARMACIST DOCUMENTED: ICD-10-PCS | Mod: CPTII,95,, | Performed by: NURSE PRACTITIONER

## 2022-03-11 RX ORDER — SULFAMETHOXAZOLE AND TRIMETHOPRIM 800; 160 MG/1; MG/1
1 TABLET ORAL 2 TIMES DAILY
Qty: 14 TABLET | Refills: 0 | Status: SHIPPED | OUTPATIENT
Start: 2022-03-11 | End: 2022-04-28

## 2022-03-11 NOTE — PROGRESS NOTES
Subjective:       Patient ID: Josh Simental is a 46 y.o. female.    Chief Complaint: No chief complaint on file.  The patient location is: [place]   The chief complaint leading to consultation is: wound    Visit type: audiovisual    Face to Face time with patient: 10  10 minutes of total time spent on the encounter, which includes face to face time and non-face to face time preparing to see the patient (eg, review of tests), Obtaining and/or reviewing separately obtained history, Documenting clinical information in the electronic or other health record, Independently interpreting results (not separately reported) and communicating results to the patient/family/caregiver, or Care coordination (not separately reported).         Each patient to whom he or she provides medical services by telemedicine is:  (1) informed of the relationship between the physician and patient and the respective role of any other health care provider with respect to management of the patient; and (2) notified that he or she may decline to receive medical services by telemedicine and may withdraw from such care at any time.    Notes: pt reports via tele medicine with chief complaint of infected wound to forearm.  Reports 3 weeks ago she had I&D to abscess to area.  Symptoms gradually improved.  2 days ago area became red with pus.  Pt reports area is hard.  Applying topical bactroban without relief.  No fever.   HPI  Review of Systems   Constitutional: Negative for activity change and unexpected weight change.   HENT: Negative for hearing loss, rhinorrhea and trouble swallowing.    Eyes: Negative for discharge and visual disturbance.   Respiratory: Negative for chest tightness and wheezing.    Cardiovascular: Negative for chest pain and palpitations.   Gastrointestinal: Negative for blood in stool, constipation, diarrhea and vomiting.   Endocrine: Negative for polydipsia and polyuria.   Genitourinary: Negative for difficulty  urinating, dysuria, hematuria and menstrual problem.   Musculoskeletal: Negative for arthralgias, joint swelling and neck pain.   Skin: Positive for wound.   Neurological: Negative for weakness and headaches.   Psychiatric/Behavioral: Negative for confusion and dysphoric mood.       Objective:      Physical Exam  HENT:      Head: Normocephalic.      Right Ear: External ear normal.      Left Ear: External ear normal.   Eyes:      Extraocular Movements: Extraocular movements intact.   Pulmonary:      Effort: Pulmonary effort is normal. No respiratory distress.   Skin:     Findings: Erythema present.             Comments: Small raised erythematous area.     Neurological:      Mental Status: She is alert and oriented to person, place, and time.   Psychiatric:         Behavior: Behavior normal.         Assessment:       1. Infected wound        Plan:   Infected wound  -     sulfamethoxazole-trimethoprim 800-160mg (BACTRIM DS) 800-160 mg Tab; Take 1 tablet by mouth 2 (two) times daily.  Dispense: 14 tablet; Refill: 0  Home care instructions given.  If no improvement follow up    No follow-ups on file.

## 2022-04-28 ENCOUNTER — OFFICE VISIT (OUTPATIENT)
Dept: INTERNAL MEDICINE | Facility: CLINIC | Age: 47
End: 2022-04-28
Payer: COMMERCIAL

## 2022-04-28 VITALS
OXYGEN SATURATION: 98 % | HEIGHT: 65 IN | HEART RATE: 68 BPM | BODY MASS INDEX: 34.6 KG/M2 | DIASTOLIC BLOOD PRESSURE: 80 MMHG | WEIGHT: 207.69 LBS | TEMPERATURE: 99 F | SYSTOLIC BLOOD PRESSURE: 120 MMHG

## 2022-04-28 DIAGNOSIS — D36.7 CYST, DERMOID, ARM, LEFT: ICD-10-CM

## 2022-04-28 DIAGNOSIS — R13.10 DYSPHAGIA, UNSPECIFIED TYPE: ICD-10-CM

## 2022-04-28 DIAGNOSIS — K21.9 GASTROESOPHAGEAL REFLUX DISEASE, UNSPECIFIED WHETHER ESOPHAGITIS PRESENT: Primary | ICD-10-CM

## 2022-04-28 PROCEDURE — 10060 PR DRAIN SKIN ABSCESS SIMPLE: ICD-10-PCS | Mod: S$GLB,,, | Performed by: FAMILY MEDICINE

## 2022-04-28 PROCEDURE — 3079F DIAST BP 80-89 MM HG: CPT | Mod: CPTII,S$GLB,, | Performed by: FAMILY MEDICINE

## 2022-04-28 PROCEDURE — 1159F MED LIST DOCD IN RCRD: CPT | Mod: CPTII,S$GLB,, | Performed by: FAMILY MEDICINE

## 2022-04-28 PROCEDURE — 3074F PR MOST RECENT SYSTOLIC BLOOD PRESSURE < 130 MM HG: ICD-10-PCS | Mod: CPTII,S$GLB,, | Performed by: FAMILY MEDICINE

## 2022-04-28 PROCEDURE — 99213 PR OFFICE/OUTPT VISIT, EST, LEVL III, 20-29 MIN: ICD-10-PCS | Mod: 25,S$GLB,, | Performed by: FAMILY MEDICINE

## 2022-04-28 PROCEDURE — 99213 OFFICE O/P EST LOW 20 MIN: CPT | Mod: 25,S$GLB,, | Performed by: FAMILY MEDICINE

## 2022-04-28 PROCEDURE — 3008F BODY MASS INDEX DOCD: CPT | Mod: CPTII,S$GLB,, | Performed by: FAMILY MEDICINE

## 2022-04-28 PROCEDURE — 99999 PR PBB SHADOW E&M-EST. PATIENT-LVL IV: CPT | Mod: PBBFAC,,, | Performed by: FAMILY MEDICINE

## 2022-04-28 PROCEDURE — 10060 I&D ABSCESS SIMPLE/SINGLE: CPT | Mod: S$GLB,,, | Performed by: FAMILY MEDICINE

## 2022-04-28 PROCEDURE — 3008F PR BODY MASS INDEX (BMI) DOCUMENTED: ICD-10-PCS | Mod: CPTII,S$GLB,, | Performed by: FAMILY MEDICINE

## 2022-04-28 PROCEDURE — 3074F SYST BP LT 130 MM HG: CPT | Mod: CPTII,S$GLB,, | Performed by: FAMILY MEDICINE

## 2022-04-28 PROCEDURE — 99999 PR PBB SHADOW E&M-EST. PATIENT-LVL IV: ICD-10-PCS | Mod: PBBFAC,,, | Performed by: FAMILY MEDICINE

## 2022-04-28 PROCEDURE — 3079F PR MOST RECENT DIASTOLIC BLOOD PRESSURE 80-89 MM HG: ICD-10-PCS | Mod: CPTII,S$GLB,, | Performed by: FAMILY MEDICINE

## 2022-04-28 PROCEDURE — 1159F PR MEDICATION LIST DOCUMENTED IN MEDICAL RECORD: ICD-10-PCS | Mod: CPTII,S$GLB,, | Performed by: FAMILY MEDICINE

## 2022-04-30 NOTE — PROGRESS NOTES
Subjective:      Patient ID: Josh Simental is a 47 y.o. female.    Chief Complaint: Gastroesophageal Reflux and Mass      Patient reports for about 2 months now having chest pains after eating, worse in the evenings and when trying to sleep, nonexertional. Had tried nexium otc for about 13 days with minimal relief. Also reports feeling lump in her throat for the past 2-3 days which she notices when swallowing.  Also has small bump on left forearm. Reports intiialy looked like a bug bite, had gone to urgent care and had I&D'd and given antibiotics, had improved but then worsened and started having redness around the wound, went back to urgent care and took course of second antibiotic. Now has raised lesion    Review of Systems   Gastrointestinal: Positive for abdominal pain. Negative for constipation, diarrhea, nausea and vomiting.   Skin: Negative for color change.     Past Medical History:   Diagnosis Date    Allergy     Anxiety     Depression     Endometriosis     Endometriosis 4/17/2019 10:14:15 AM    [facility] [employer] Historical - DO NOT USE: Endometriosis-No Additional Notes    Migraines     Other depressive disorder 4/17/2019 10:12:51 AM    [facility] [employer] Historical - [facility]: Depression-No Additional Notes    Pure hypercholesterolemia 6/30/2021 9:34:13 AM    [facility] [employer] Historical - Endocrine/Metabolic/Immune: Hypercholesterolemia-No Additional Notes    Sinusitis           Past Surgical History:   Procedure Laterality Date    CHOLECYSTECTOMY  07/2017    EYE SURGERY      LASIK    GALLBLADDER SURGERY  07/2017    LAPAROSCOPY  1994    dx 2/2 endometriosis    TUBAL LIGATION       Family History   Problem Relation Age of Onset    Diabetes Mother     Thyroid disease Mother     Diabetes Father     Cancer Father         Renal     Cancer Maternal Grandmother         Cervical     Diabetes Maternal Grandfather     Cancer Maternal Grandfather         Prostate    Stroke Paternal Grandmother      Social  "History     Socioeconomic History    Marital status:    Tobacco Use    Smoking status: Former Smoker     Packs/day: 0.25     Years: 2.00     Pack years: 0.50     Types: Cigarettes     Quit date: 1999     Years since quittin.8    Smokeless tobacco: Never Used    Tobacco comment: maybe 1 pk a week    Substance and Sexual Activity    Alcohol use: No    Drug use: No    Sexual activity: Yes     Partners: Male     Birth control/protection: None     Social Determinants of Health     Financial Resource Strain: Low Risk     Difficulty of Paying Living Expenses: Not hard at all   Food Insecurity: No Food Insecurity    Worried About Running Out of Food in the Last Year: Never true    Ran Out of Food in the Last Year: Never true   Transportation Needs: No Transportation Needs    Lack of Transportation (Medical): No    Lack of Transportation (Non-Medical): No   Physical Activity: Insufficiently Active    Days of Exercise per Week: 4 days    Minutes of Exercise per Session: 20 min   Stress: No Stress Concern Present    Feeling of Stress : Only a little   Social Connections: Unknown    Frequency of Communication with Friends and Family: More than three times a week    Frequency of Social Gatherings with Friends and Family: Once a week    Active Member of Clubs or Organizations: Yes    Attends Club or Organization Meetings: More than 4 times per year    Marital Status:    Housing Stability: Low Risk     Unable to Pay for Housing in the Last Year: No    Number of Places Lived in the Last Year: 1    Unstable Housing in the Last Year: No     Review of patient's allergies indicates:   Allergen Reactions    No known drug allergies        Objective:       /80 (BP Location: Left arm, Patient Position: Sitting, BP Method: Large (Manual))   Pulse 68   Temp 98.8 °F (37.1 °C) (Tympanic)   Ht 5' 5" (1.651 m)   Wt 94.2 kg (207 lb 10.8 oz)   SpO2 98%   BMI 34.56 kg/m²   Physical " Exam  Constitutional:       General: She is not in acute distress.     Appearance: Normal appearance. She is well-developed. She is not ill-appearing or diaphoretic.   Cardiovascular:      Rate and Rhythm: Normal rate and regular rhythm.      Heart sounds: Normal heart sounds.   Pulmonary:      Effort: Pulmonary effort is normal.      Breath sounds: Normal breath sounds.   Skin:     Comments: slightly raised erythematous discrete fluctuant lesion   Neurological:      Mental Status: She is alert and oriented to person, place, and time.   Psychiatric:         Mood and Affect: Mood normal.         Behavior: Behavior normal.         Thought Content: Thought content normal.         Judgment: Judgment normal.     cyst I &D'd today - moderate amount of thick material removed.  Assessment:     1. Gastroesophageal reflux disease, unspecified whether esophagitis present    2. Dysphagia, unspecified type    3. Cyst, dermoid, arm, left      Plan:   Gastroesophageal reflux disease, unspecified whether esophagitis present    Dysphagia, unspecified type    Cyst, dermoid, arm, left    will have patient start nexium, also try stopping fish oil capsules  Medication List with Changes/Refills   Current Medications    BLOOD SUGAR DIAGNOSTIC STRP    To check BG 2 times daily, to use with insurance preferred meter    BLOOD-GLUCOSE METER KIT    To check BG 2 times daily, to use with insurance preferred meter    ESTRADIOL-LEVONORGESTREL (CLIMARAPRO) 0.045-0.015 MG/24 HR    Place 1 patch onto the skin once a week.    FISH OIL/BORAGE/FLAX/OM3,6,9 1 (OMEGA 3-6-9 ORAL)    Take 1 tablet by mouth Daily.    LACTOBACILLUS ACIDOPHILUS (PROBIOTIC ORAL)    Take 1 tablet by mouth once daily.    LANCETS MISC    To check BG 2 times daily, to use with insurance preferred meter    METFORMIN (GLUCOPHAGE-XR) 500 MG ER 24HR TABLET    Take 1 tablet (500 mg total) by mouth daily with breakfast.   Discontinued Medications    FLUTICASONE PROPIONATE (FLONASE) 50  MCG/ACTUATION NASAL SPRAY    2 sprays (100 mcg total) by Each Nostril route once daily.    MUPIROCIN (BACTROBAN) 2 % OINTMENT    APPLY TOPICALLY TO THE AFFECTED AREA TWICE DAILY FOR 7 DAYS    SULFAMETHOXAZOLE-TRIMETHOPRIM 800-160MG (BACTRIM DS) 800-160 MG TAB    Take 1 tablet by mouth 2 (two) times daily.

## 2022-04-30 NOTE — PROCEDURES
"Incision & Drainage    Date/Time: 4/28/2022 8:40 AM  Performed by: Dianna Issa MD  Authorized by: Dianna Issa MD     Time out: Immediately prior to procedure a "time out" was called to verify the correct patient, procedure, equipment, support staff and site/side marked as required.    Consent Done?:  Yes (Verbal)    Type:  Cyst  Body area:  Upper extremity  Location details:  Left arm  Anesthesia:  Local infiltration  Local anesthetic: lidocaine 1% with epinephrine  Scalpel size:  11  Incision type:  Single straight  Incision depth: dermal    Complexity:  Simple  Drainage amount:  Moderate  Wound treatment:  Drainage  Patient tolerance:  Patient tolerated the procedure well with no immediate complications      "

## 2022-05-12 ENCOUNTER — OFFICE VISIT (OUTPATIENT)
Dept: INTERNAL MEDICINE | Facility: CLINIC | Age: 47
End: 2022-05-12
Payer: COMMERCIAL

## 2022-05-12 VITALS
TEMPERATURE: 98 F | SYSTOLIC BLOOD PRESSURE: 122 MMHG | DIASTOLIC BLOOD PRESSURE: 86 MMHG | OXYGEN SATURATION: 98 % | HEIGHT: 65 IN | WEIGHT: 208.75 LBS | HEART RATE: 75 BPM | BODY MASS INDEX: 34.78 KG/M2

## 2022-05-12 DIAGNOSIS — R20.2 NUMBNESS AND TINGLING SENSATION OF SKIN: ICD-10-CM

## 2022-05-12 DIAGNOSIS — R20.0 NUMBNESS AND TINGLING SENSATION OF SKIN: ICD-10-CM

## 2022-05-12 DIAGNOSIS — R20.0 BILATERAL HAND NUMBNESS: Primary | ICD-10-CM

## 2022-05-12 PROCEDURE — 99999 PR PBB SHADOW E&M-EST. PATIENT-LVL IV: CPT | Mod: PBBFAC,,, | Performed by: FAMILY MEDICINE

## 2022-05-12 PROCEDURE — 99213 PR OFFICE/OUTPT VISIT, EST, LEVL III, 20-29 MIN: ICD-10-PCS | Mod: S$GLB,,, | Performed by: FAMILY MEDICINE

## 2022-05-12 PROCEDURE — 1159F MED LIST DOCD IN RCRD: CPT | Mod: CPTII,S$GLB,, | Performed by: FAMILY MEDICINE

## 2022-05-12 PROCEDURE — 99213 OFFICE O/P EST LOW 20 MIN: CPT | Mod: S$GLB,,, | Performed by: FAMILY MEDICINE

## 2022-05-12 PROCEDURE — 99999 PR PBB SHADOW E&M-EST. PATIENT-LVL IV: ICD-10-PCS | Mod: PBBFAC,,, | Performed by: FAMILY MEDICINE

## 2022-05-12 PROCEDURE — 3008F BODY MASS INDEX DOCD: CPT | Mod: CPTII,S$GLB,, | Performed by: FAMILY MEDICINE

## 2022-05-12 PROCEDURE — 3074F PR MOST RECENT SYSTOLIC BLOOD PRESSURE < 130 MM HG: ICD-10-PCS | Mod: CPTII,S$GLB,, | Performed by: FAMILY MEDICINE

## 2022-05-12 PROCEDURE — 3074F SYST BP LT 130 MM HG: CPT | Mod: CPTII,S$GLB,, | Performed by: FAMILY MEDICINE

## 2022-05-12 PROCEDURE — 1159F PR MEDICATION LIST DOCUMENTED IN MEDICAL RECORD: ICD-10-PCS | Mod: CPTII,S$GLB,, | Performed by: FAMILY MEDICINE

## 2022-05-12 PROCEDURE — 3008F PR BODY MASS INDEX (BMI) DOCUMENTED: ICD-10-PCS | Mod: CPTII,S$GLB,, | Performed by: FAMILY MEDICINE

## 2022-05-12 PROCEDURE — 3079F PR MOST RECENT DIASTOLIC BLOOD PRESSURE 80-89 MM HG: ICD-10-PCS | Mod: CPTII,S$GLB,, | Performed by: FAMILY MEDICINE

## 2022-05-12 PROCEDURE — 3079F DIAST BP 80-89 MM HG: CPT | Mod: CPTII,S$GLB,, | Performed by: FAMILY MEDICINE

## 2022-05-12 RX ORDER — MELOXICAM 15 MG/1
15 TABLET ORAL DAILY
Qty: 10 TABLET | Refills: 0 | Status: SHIPPED | OUTPATIENT
Start: 2022-05-12 | End: 2022-06-01

## 2022-05-12 RX ORDER — ESOMEPRAZOLE MAGNESIUM 20 MG/1
20 TABLET, DELAYED RELEASE ORAL 2 TIMES DAILY
COMMUNITY
End: 2022-06-30

## 2022-05-12 NOTE — PROGRESS NOTES
Subjective:      Patient ID: Josh Simental is a 47 y.o. female.    Chief Complaint: Numbness (Numbness and tingling in hands and across abdomen.)      Patient reports for about a week now having complete numbness in bilateral hands.  Had this once before when she had shoulder problems.  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  Also reports for about 3 days now numbness across anterior abdominal wall around the back, constant, no back pain.    Review of Systems   Constitutional: Negative for activity change and unexpected weight change.   HENT: Negative for hearing loss, rhinorrhea and trouble swallowing.    Eyes: Negative for discharge and visual disturbance.   Respiratory: Negative for chest tightness and wheezing.    Cardiovascular: Negative for chest pain and palpitations.   Gastrointestinal: Negative for blood in stool, constipation, diarrhea and vomiting.   Endocrine: Negative for polydipsia and polyuria.   Genitourinary: Negative for difficulty urinating, dysuria, hematuria and menstrual problem.   Musculoskeletal: Positive for arthralgias. Negative for joint swelling and neck pain.   Neurological: Positive for numbness. Negative for weakness and headaches.   Psychiatric/Behavioral: Negative for confusion and dysphoric mood.     Past Medical History:   Diagnosis Date    Allergy     Anxiety     Depression     Endometriosis     Endometriosis 4/17/2019 10:14:15 AM    [employer] Historical - DO NOT USE: Endometriosis-No Additional Notes    Migraines     Other depressive disorder 4/17/2019 10:12:51 AM    [facility] Historical - [facility]: Depression-No Additional Notes    Pure hypercholesterolemia 6/30/2021 9:34:13 AM    [employer] Historical - Endocrine/Metabolic/Immune: Hypercholesterolemia-No Additional Notes    Sinusitis           Past Surgical History:   Procedure Laterality Date    CHOLECYSTECTOMY  07/2017    EYE SURGERY      LASIK     GALLBLADDER SURGERY  2017    LAPAROSCOPY  1994    dx 2/2 endometriosis    TUBAL LIGATION       Family History   Problem Relation Age of Onset    Diabetes Mother     Thyroid disease Mother     Diabetes Father     Cancer Father         Renal     Cancer Maternal Grandmother         Cervical     Diabetes Maternal Grandfather     Cancer Maternal Grandfather         Prostate    Stroke Paternal Grandmother      Social History     Socioeconomic History    Marital status:    Tobacco Use    Smoking status: Former Smoker     Packs/day: 0.25     Years: 2.00     Pack years: 0.50     Types: Cigarettes     Quit date: 1999     Years since quittin.9    Smokeless tobacco: Never Used    Tobacco comment: maybe 1 pk a week    Substance and Sexual Activity    Alcohol use: No    Drug use: No    Sexual activity: Yes     Partners: Male     Birth control/protection: None     Social Determinants of Health     Financial Resource Strain: Low Risk     Difficulty of Paying Living Expenses: Not hard at all   Food Insecurity: No Food Insecurity    Worried About Running Out of Food in the Last Year: Never true    Ran Out of Food in the Last Year: Never true   Transportation Needs: No Transportation Needs    Lack of Transportation (Medical): No    Lack of Transportation (Non-Medical): No   Physical Activity: Insufficiently Active    Days of Exercise per Week: 4 days    Minutes of Exercise per Session: 30 min   Stress: No Stress Concern Present    Feeling of Stress : Only a little   Social Connections: Unknown    Frequency of Communication with Friends and Family: More than three times a week    Frequency of Social Gatherings with Friends and Family: Twice a week    Active Member of Clubs or Organizations: Yes    Attends Club or Organization Meetings: More than 4 times per year    Marital Status:    Housing Stability: Low Risk     Unable to Pay for Housing in the Last Year: No    Number of  "Places Lived in the Last Year: 1    Unstable Housing in the Last Year: No     Review of patient's allergies indicates:   Allergen Reactions    No known drug allergies        Objective:       /86 (BP Location: Left arm, Patient Position: Sitting, BP Method: Medium (Manual))   Pulse 75   Temp 97.6 °F (36.4 °C) (Tympanic)   Ht 5' 5" (1.651 m)   Wt 94.7 kg (208 lb 12.4 oz)   SpO2 98%   BMI 34.74 kg/m²   Physical Exam  Constitutional:       General: She is not in acute distress.     Appearance: Normal appearance. She is well-developed. She is not ill-appearing or diaphoretic.   Musculoskeletal:         General: No swelling, tenderness or deformity. Normal range of motion.      Comments: Bilateral positive Tinel, positive Phalen's   Skin:     Findings: No erythema or rash.      Comments: Dermatomal Distribution numbness to anterior upper abdominal wall, upper back   Neurological:      Mental Status: She is alert and oriented to person, place, and time.   Psychiatric:         Mood and Affect: Mood normal.         Behavior: Behavior normal.         Thought Content: Thought content normal.         Judgment: Judgment normal.       Assessment:     1. Bilateral hand numbness    2. Numbness and tingling sensation of skin      Plan:   Bilateral hand numbness    Numbness and tingling sensation of skin  Comments:  abdomen      Other orders  -     meloxicam (MOBIC) 15 MG tablet; Take 1 tablet (15 mg total) by mouth once daily. Take with meal  Dispense: 10 tablet; Refill: 0    Suspect hand numbness is carpal tunnel-also recommended she use wrist splints to sleep in, will let me know if this does not resolve with this and NSAID, consider ortho referral  Distribution of numbness of abdomen and back seems to be compression by broad band-patient will make sure bra band is not restrictive or tight, will also let me know if this does not resolve with nsaid  Medication List with Changes/Refills   New Medications    MELOXICAM " (MOBIC) 15 MG TABLET    Take 1 tablet (15 mg total) by mouth once daily. Take with meal   Current Medications    BLOOD SUGAR DIAGNOSTIC STRP    To check BG 2 times daily, to use with insurance preferred meter    BLOOD-GLUCOSE METER KIT    To check BG 2 times daily, to use with insurance preferred meter    ESOMEPRAZOLE MAGNESIUM 20 MG TBEC    Take 20 mg by mouth 2 (two) times daily.    ESTRADIOL-LEVONORGESTREL (CLIMARAPRO) 0.045-0.015 MG/24 HR    Place 1 patch onto the skin once a week.    FISH OIL/BORAGE/FLAX/OM3,6,9 1 (OMEGA 3-6-9 ORAL)    Take 1 tablet by mouth Daily.    LACTOBACILLUS ACIDOPHILUS (PROBIOTIC ORAL)    Take 1 tablet by mouth once daily.    LANCETS MISC    To check BG 2 times daily, to use with insurance preferred meter    METFORMIN (GLUCOPHAGE-XR) 500 MG ER 24HR TABLET    Take 1 tablet (500 mg total) by mouth daily with breakfast.

## 2022-05-18 ENCOUNTER — PATIENT MESSAGE (OUTPATIENT)
Dept: INTERNAL MEDICINE | Facility: CLINIC | Age: 47
End: 2022-05-18
Payer: COMMERCIAL

## 2022-05-18 DIAGNOSIS — R20.0 BILATERAL HAND NUMBNESS: Primary | ICD-10-CM

## 2022-06-01 ENCOUNTER — OFFICE VISIT (OUTPATIENT)
Dept: ORTHOPEDICS | Facility: CLINIC | Age: 47
End: 2022-06-01
Payer: COMMERCIAL

## 2022-06-01 VITALS — WEIGHT: 208 LBS | HEIGHT: 65 IN | BODY MASS INDEX: 34.66 KG/M2

## 2022-06-01 DIAGNOSIS — G56.03 CARPAL TUNNEL SYNDROME ON BOTH SIDES: Primary | ICD-10-CM

## 2022-06-01 DIAGNOSIS — R20.0 BILATERAL HAND NUMBNESS: ICD-10-CM

## 2022-06-01 PROCEDURE — 3008F BODY MASS INDEX DOCD: CPT | Mod: CPTII,S$GLB,, | Performed by: ORTHOPAEDIC SURGERY

## 2022-06-01 PROCEDURE — 99999 PR PBB SHADOW E&M-EST. PATIENT-LVL III: ICD-10-PCS | Mod: PBBFAC,,, | Performed by: ORTHOPAEDIC SURGERY

## 2022-06-01 PROCEDURE — 1160F RVW MEDS BY RX/DR IN RCRD: CPT | Mod: CPTII,S$GLB,, | Performed by: ORTHOPAEDIC SURGERY

## 2022-06-01 PROCEDURE — 99203 OFFICE O/P NEW LOW 30 MIN: CPT | Mod: S$GLB,,, | Performed by: ORTHOPAEDIC SURGERY

## 2022-06-01 PROCEDURE — 3008F PR BODY MASS INDEX (BMI) DOCUMENTED: ICD-10-PCS | Mod: CPTII,S$GLB,, | Performed by: ORTHOPAEDIC SURGERY

## 2022-06-01 PROCEDURE — 1159F MED LIST DOCD IN RCRD: CPT | Mod: CPTII,S$GLB,, | Performed by: ORTHOPAEDIC SURGERY

## 2022-06-01 PROCEDURE — 1160F PR REVIEW ALL MEDS BY PRESCRIBER/CLIN PHARMACIST DOCUMENTED: ICD-10-PCS | Mod: CPTII,S$GLB,, | Performed by: ORTHOPAEDIC SURGERY

## 2022-06-01 PROCEDURE — 1159F PR MEDICATION LIST DOCUMENTED IN MEDICAL RECORD: ICD-10-PCS | Mod: CPTII,S$GLB,, | Performed by: ORTHOPAEDIC SURGERY

## 2022-06-01 PROCEDURE — 99999 PR PBB SHADOW E&M-EST. PATIENT-LVL III: CPT | Mod: PBBFAC,,, | Performed by: ORTHOPAEDIC SURGERY

## 2022-06-01 PROCEDURE — 99203 PR OFFICE/OUTPT VISIT, NEW, LEVL III, 30-44 MIN: ICD-10-PCS | Mod: S$GLB,,, | Performed by: ORTHOPAEDIC SURGERY

## 2022-06-01 NOTE — PROGRESS NOTES
Subjective:     Patient ID: Josh Simental is a 47 y.o. female.    Chief Complaint: Numbness of the Right Hand and Numbness of the Left Hand      HPI:  The patient is a 47-year-old female with numbness in median nerve distribution of both hands for the past month or so.  She has tried wrist splints without improvement.  The numbness is constant rather than intermittent.  She has a history of bilateral frozen shoulder and the left is still not regained full motion over the last 3 years.  She last saw Dr. Jara for that issue.  She has not had nerve conduction studies.    Past Medical History:   Diagnosis Date    Allergy     Anxiety     Depression     Endometriosis     Endometriosis 2019 10:14:15 AM    [facility] Historical - DO NOT USE: Endometriosis-No Additional Notes    Migraines     Other depressive disorder 2019 10:12:51 AM    [facility] Historical - [facility]: Depression-No Additional Notes    Pure hypercholesterolemia 2021 9:34:13 AM    [facility] Historical - Endocrine/Metabolic/Immune: Hypercholesterolemia-No Additional Notes    Sinusitis      Past Surgical History:   Procedure Laterality Date    CHOLECYSTECTOMY  2017    EYE SURGERY      LASIK    GALLBLADDER SURGERY  2017    LAPAROSCOPY  1994    dx 2/2 endometriosis    TUBAL LIGATION       Family History   Problem Relation Age of Onset    Diabetes Mother     Thyroid disease Mother     Diabetes Father     Cancer Father         Renal     Cancer Maternal Grandmother         Cervical     Diabetes Maternal Grandfather     Cancer Maternal Grandfather         Prostate    Stroke Paternal Grandmother      Social History     Socioeconomic History    Marital status:    Tobacco Use    Smoking status: Former Smoker     Packs/day: 0.25     Years: 2.00     Pack years: 0.50     Types: Cigarettes     Quit date: 1999     Years since quittin.9    Smokeless tobacco: Never Used    Tobacco comment: maybe 1  pk a week    Substance and Sexual Activity    Alcohol use: No    Drug use: No    Sexual activity: Yes     Partners: Male     Birth control/protection: None     Social Determinants of Health     Financial Resource Strain: Low Risk     Difficulty of Paying Living Expenses: Not hard at all   Food Insecurity: No Food Insecurity    Worried About Running Out of Food in the Last Year: Never true    Ran Out of Food in the Last Year: Never true   Transportation Needs: No Transportation Needs    Lack of Transportation (Medical): No    Lack of Transportation (Non-Medical): No   Physical Activity: Insufficiently Active    Days of Exercise per Week: 4 days    Minutes of Exercise per Session: 30 min   Stress: No Stress Concern Present    Feeling of Stress : Only a little   Social Connections: Unknown    Frequency of Communication with Friends and Family: More than three times a week    Frequency of Social Gatherings with Friends and Family: Twice a week    Active Member of Clubs or Organizations: Yes    Attends Club or Organization Meetings: More than 4 times per year    Marital Status:    Housing Stability: Low Risk     Unable to Pay for Housing in the Last Year: No    Number of Places Lived in the Last Year: 1    Unstable Housing in the Last Year: No     Medication List with Changes/Refills   Current Medications    BLOOD SUGAR DIAGNOSTIC STRP    To check BG 2 times daily, to use with insurance preferred meter    BLOOD-GLUCOSE METER KIT    To check BG 2 times daily, to use with insurance preferred meter    ESOMEPRAZOLE MAGNESIUM 20 MG TBEC    Take 20 mg by mouth 2 (two) times daily.    ESTRADIOL-LEVONORGESTREL (CLIMARAPRO) 0.045-0.015 MG/24 HR    Place 1 patch onto the skin once a week.    LACTOBACILLUS ACIDOPHILUS (PROBIOTIC ORAL)    Take 1 tablet by mouth once daily.    LANCETS MISC    To check BG 2 times daily, to use with insurance preferred meter    METFORMIN (GLUCOPHAGE-XR) 500 MG ER 24HR TABLET     Take 1 tablet (500 mg total) by mouth daily with breakfast.   Discontinued Medications    FISH OIL/BORAGE/FLAX/OM3,6,9 1 (OMEGA 3-6-9 ORAL)    Take 1 tablet by mouth Daily.    MELOXICAM (MOBIC) 15 MG TABLET    Take 1 tablet (15 mg total) by mouth once daily. Take with meal     Review of patient's allergies indicates:   Allergen Reactions    No known drug allergies      Review of Systems   Constitutional: Negative for malaise/fatigue.   HENT: Negative for hearing loss.    Eyes: Negative for double vision and visual disturbance.   Cardiovascular: Negative for chest pain.   Respiratory: Negative for shortness of breath.    Endocrine: Negative for cold intolerance.   Hematologic/Lymphatic: Does not bruise/bleed easily.   Skin: Negative for poor wound healing and suspicious lesions.   Musculoskeletal: Negative for gout, joint pain and joint swelling.   Gastrointestinal: Negative for nausea and vomiting.   Genitourinary: Negative for dysuria.   Neurological: Positive for headaches, numbness, paresthesias and sensory change.   Psychiatric/Behavioral: Positive for depression. Negative for memory loss and substance abuse. The patient is not nervous/anxious.    Allergic/Immunologic: Negative for persistent infections.       Objective:   Body mass index is 34.61 kg/m².  There were no vitals filed for this visit.             General    Constitutional: She is oriented to person, place, and time. She appears well-developed and well-nourished. No distress.   HENT:   Head: Normocephalic.   Eyes: EOM are normal.   Pulmonary/Chest: Effort normal.   Neurological: She is oriented to person, place, and time.   Psychiatric: She has a normal mood and affect.             Right Hand/Wrist Exam     Inspection   Scars: Wrist - absent Hand -  absent  Effusion: Wrist - absent Hand -  absent    Pain   Wrist - The patient exhibits pain of the flexor/pronator group.    Tests   Phalens sign: positive  Tinel's sign (median nerve):  positive  Carpal Tunnel Compression Test: positive    Atrophy   Thenar:  negative  Intrinsic:  negative    Other     Neuorologic Exam    Median Distribution: abnormal  Ulnar Distribution: normal  Radial Distribution: normal      Left Hand/Wrist Exam     Inspection   Scars: Wrist - absent Hand -  absent  Effusion: Wrist - absent Hand -  absent    Pain   Wrist - The patient exhibits pain of the flexor/pronator group.    Tests   Phalens sign: positive  Tinel's sign (median nerve): positive  Carpal Tunnel Compression Test: positive    Atrophy  Thenar:  Negative  Intrinsic: negative    Other     Sensory Exam  Median Distribution: abnormal  Ulnar Distribution: normal  Radial Distribution: normal          Vascular Exam       Capillary Refill  Right Hand: normal capillary refill  Left Hand: normal capillary refill               radiographs were not obtained today  Assessment:     Encounter Diagnoses   Name Primary?    Bilateral hand numbness     Carpal tunnel syndrome on both sides Yes        Plan:       The patient already has wrist splints.  She is sent for nerve conduction studies for further documentation.  She will return with that study.              Disclaimer: This note was prepared using a voice recognition system and is likely to have sound alike errors within the text.

## 2022-06-18 ENCOUNTER — PATIENT MESSAGE (OUTPATIENT)
Dept: ORTHOPEDICS | Facility: CLINIC | Age: 47
End: 2022-06-18
Payer: COMMERCIAL

## 2022-06-22 ENCOUNTER — OFFICE VISIT (OUTPATIENT)
Dept: PHYSICAL MEDICINE AND REHAB | Facility: CLINIC | Age: 47
End: 2022-06-22
Attending: FAMILY MEDICINE
Payer: COMMERCIAL

## 2022-06-22 VITALS
RESPIRATION RATE: 15 BRPM | SYSTOLIC BLOOD PRESSURE: 129 MMHG | WEIGHT: 208 LBS | DIASTOLIC BLOOD PRESSURE: 71 MMHG | BODY MASS INDEX: 34.66 KG/M2 | HEIGHT: 65 IN | HEART RATE: 87 BPM

## 2022-06-22 DIAGNOSIS — G56.03 CARPAL TUNNEL SYNDROME ON BOTH SIDES: ICD-10-CM

## 2022-06-22 PROCEDURE — 3074F SYST BP LT 130 MM HG: CPT | Mod: CPTII,S$GLB,, | Performed by: PHYSICAL MEDICINE & REHABILITATION

## 2022-06-22 PROCEDURE — 99999 PR PBB SHADOW E&M-EST. PATIENT-LVL III: ICD-10-PCS | Mod: PBBFAC,,, | Performed by: PHYSICAL MEDICINE & REHABILITATION

## 2022-06-22 PROCEDURE — 1159F MED LIST DOCD IN RCRD: CPT | Mod: CPTII,S$GLB,, | Performed by: PHYSICAL MEDICINE & REHABILITATION

## 2022-06-22 PROCEDURE — 95912 NRV CNDJ TEST 11-12 STUDIES: CPT | Mod: S$GLB,,, | Performed by: PHYSICAL MEDICINE & REHABILITATION

## 2022-06-22 PROCEDURE — 1160F RVW MEDS BY RX/DR IN RCRD: CPT | Mod: CPTII,S$GLB,, | Performed by: PHYSICAL MEDICINE & REHABILITATION

## 2022-06-22 PROCEDURE — 99203 OFFICE O/P NEW LOW 30 MIN: CPT | Mod: 25,S$GLB,, | Performed by: PHYSICAL MEDICINE & REHABILITATION

## 2022-06-22 PROCEDURE — 1159F PR MEDICATION LIST DOCUMENTED IN MEDICAL RECORD: ICD-10-PCS | Mod: CPTII,S$GLB,, | Performed by: PHYSICAL MEDICINE & REHABILITATION

## 2022-06-22 PROCEDURE — 99999 PR PBB SHADOW E&M-EST. PATIENT-LVL III: CPT | Mod: PBBFAC,,, | Performed by: PHYSICAL MEDICINE & REHABILITATION

## 2022-06-22 PROCEDURE — 99203 PR OFFICE/OUTPT VISIT, NEW, LEVL III, 30-44 MIN: ICD-10-PCS | Mod: 25,S$GLB,, | Performed by: PHYSICAL MEDICINE & REHABILITATION

## 2022-06-22 PROCEDURE — 3008F PR BODY MASS INDEX (BMI) DOCUMENTED: ICD-10-PCS | Mod: CPTII,S$GLB,, | Performed by: PHYSICAL MEDICINE & REHABILITATION

## 2022-06-22 PROCEDURE — 95912 PR NERVE CONDUCTION STUDY; 11 -12 STUDIES: ICD-10-PCS | Mod: S$GLB,,, | Performed by: PHYSICAL MEDICINE & REHABILITATION

## 2022-06-22 PROCEDURE — 3008F BODY MASS INDEX DOCD: CPT | Mod: CPTII,S$GLB,, | Performed by: PHYSICAL MEDICINE & REHABILITATION

## 2022-06-22 PROCEDURE — 1160F PR REVIEW ALL MEDS BY PRESCRIBER/CLIN PHARMACIST DOCUMENTED: ICD-10-PCS | Mod: CPTII,S$GLB,, | Performed by: PHYSICAL MEDICINE & REHABILITATION

## 2022-06-22 PROCEDURE — 3074F PR MOST RECENT SYSTOLIC BLOOD PRESSURE < 130 MM HG: ICD-10-PCS | Mod: CPTII,S$GLB,, | Performed by: PHYSICAL MEDICINE & REHABILITATION

## 2022-06-22 PROCEDURE — 3078F DIAST BP <80 MM HG: CPT | Mod: CPTII,S$GLB,, | Performed by: PHYSICAL MEDICINE & REHABILITATION

## 2022-06-22 PROCEDURE — 3078F PR MOST RECENT DIASTOLIC BLOOD PRESSURE < 80 MM HG: ICD-10-PCS | Mod: CPTII,S$GLB,, | Performed by: PHYSICAL MEDICINE & REHABILITATION

## 2022-06-22 NOTE — PROGRESS NOTES
[facility]  Department of Physiatry  [facility]   [address] [place] [address]           Full Name: Josh Simental YOB: 1975  Patient ID: [id number]      Visit Date: 6/22/2022 08:53  Age: 47 Years 2 Months Old  Examining Physician: Katherine Rausch M.D.  Referring Physician: Dr Glover  History: upper ext pain      Chief Complaint   Patient presents with    Hand Pain       HPI: This is a 47 y.o.  female being seen in clinic today for evaluation of chronic hand achy pain with numbness/tingling into her fingers.  Her symptoms are the same on both side, worse with increased usage and at night when trying to rest.  She has noticed issues with  and fine motor.  Shaking, rubbing, position change provide some relief.     History obtained from patient    Past family, medical, social, and surgical history reviewed in chart    Review of Systems:     General- denies lethargy, weight change, fever, chills  Head/neck- denies swallowing difficulties  ENT- denies hearing changes  Cardiovascular-denies chest pain  Pulmonary- denies shortness of breath  GI- denies constipation or bowel incontinence  - denies bladder incontinence  Skin- denies wounds or rashes  Musculoskeletal- denies weakness, +pain  Neurologic- +numbness and tingling  Psychiatric-+depression, denies anxiety  Lymphatic-denies swelling  Endocrine- denies hypoglycemic symptoms/+DM history  All other pertinent systems negative     Physical Examination:  General: Well developed, well nourished female, NAD  HEENT:NCAT EOMI bilaterally   Pulmonary:Normal respirations    Spinal Examination: CERVICAL  Active ROM is within normal limits.  Inspection: No deformity of spinal alignment.  Palpation: No vertebral tenderness to percussion.      Musculoskeletal Tests:  Phalen:+bilaterally  Elbow compression (ulnar): neg  Tinels at wrist: neg    Bilateral Upper and Lower Extremities:  Pulses are 2+ at radial  bilaterally.  Shoulder/Elbow/Wrist/Hand ROM wnl  Hip/Knee/Ankle ROM   Bilateral Extremities show normal capillary refill.  No signs of cyanosis, rubor, edema, skin changes, or dysvascular changes of appendages.  Nails appear intact.    Neurological Exam:  Cranial Nerves:  II-XII grossly intact    Manual Muscle Testing: (Motor 5=normal)  5/5 strength bilateral upper extremities    No focal atrophy is noted of either upper  extremity.    Bilateral Reflexes:  Abbott's response is absent bilaterally.    Sensation: tested to light touch  - intact in arms except dec at fingertips  Gait: Narrow base and good arm swing.      Entire procedure explained to patient prior to proceeding.  Verbal consent obtained      Sensory NCS      Nerve / Sites Rec. Site Onset Lat Peak Lat NP Amp PP Amp Segments Distance Velocity     ms ms µV µV  mm m/s   R Hand Sensories - Median, Ulnar, Radial      Median Digit II Wrist 2.60 3.28 16.9 26.6 Median Digit II - Wrist 140 54      Ulnar Digit V Wrist 2.45 3.13 41.8 47.8 Ulnar Digit V - Wrist 140 57      Radial Forearm Snuff Box 1.72 2.34 24.7 22.6 Radial Forearm - Snuff Box 100 58   L Hand Sensories - Median, Ulnar, Radial      Median Digit II Wrist 2.45 3.23 19.7 32.2 Median Digit II - Wrist 140 57      Ulnar Digit V Wrist 2.40 3.13 21.4 32.7 Ulnar Digit V - Wrist 140 58      Radial Forearm Snuff Box 1.61 2.34 20.5 26.4 Radial Forearm - Snuff Box 100 62       Combined Sensory Index      Nerve / Sites Rec. Site Peak Lat NP Amp PP Amp Segments Peak Diff     ms µV µV  ms   R Median - CSI      Median Thumb 2.81 29.0 49.9 Median - Radial 0.57      Radial Thumb 2.24 22.7 22.6 Median - Ulnar 0.47      Median Ring 3.44 24.3 40.5 Median palm - Ulnar palm       Ulnar Ring 2.97 25.6 36.2        CSI     CSI 1.04   L Median - CSI      Median Thumb 2.66 35.6 66.4 Median - Radial 0.57      Radial Thumb 2.08 25.6 30.7 Median - Ulnar 0.16      Median Ring 3.44 8.5 2.6 Median palm - Ulnar palm 0.31      Ulnar  Ring 3.28 6.4 4.3        Median palm Wrist 1.88 53.9 50.2        Ulnar palm Wrist 1.56 28.2 40.6        CSI     CSI 1.04       Motor NCS      Nerve / Sites Muscle Latency Amplitude Amp % Duration Segments Distance Lat Diff Velocity     ms mV % ms  mm ms m/s   R Median - APB      Wrist APB 3.91 6.6 100 6.46 Wrist - APB 80        Elbow APB 7.14 4.9 73.4 7.08 Elbow - Wrist 210 3.23 65   L Median - APB      Wrist APB 3.59 4.6 100 6.20 Wrist - APB 80        Elbow APB 7.29 4.6 100 6.56 Elbow - Wrist 240 3.70 65   R Ulnar - ADM      Wrist ADM 2.60 6.9 100 5.83 Wrist - ADM 80        B.Elbow ADM 5.94 5.5 78.6 6.20 B.Elbow - Wrist 210 3.33 63      A.Elbow ADM 7.76 5.2 74.9 6.82 A.Elbow - B.Elbow 110 1.82 60   L Ulnar - ADM      Wrist ADM 2.50 6.9 100 5.94 Wrist - ADM 80        B.Elbow ADM 5.83 5.5 80.1 6.46 B.Elbow - Wrist 200 3.33 60      A.Elbow ADM 7.97 5.0 72.3 6.56 A.Elbow - B.Elbow 120 2.14 56                                    INTERPRETATION  -Bilateral median motor nerve conduction study showed normal latency, amplitude, and conduction velocity  -Bilateral median sensory nerve conduction study showed normal peak latency and amplitude  -Bilateral ulnar motor nerve conduction study showed normal latency, amplitude, and conduction velocity  -Bilateral ulnar sensory nerve conduction study showed normal peak latency and amplitude  -Bilateral radial sensory nerve conduction study showed normal peak latency and amplitude  -Bilateral combined sensory index was significant    IMPRESSION  1. ABNORMAL study  2. There is electrodiagnostic evidence of a mild demyelinating median neuropathy (Carpal tunnel syndrome) across the right wrist and a very mild demyelinating CTS across the left wrist    PLAN  Discussed in detail for greater than 30 minutes about diagnosis and treatment plan    1. Follow up with referring provider: Dr. Daryl Delgado*  2. Handouts on CTS provided  3. This study is good for one year. If symptoms worsen  or do not improve, please re-consult.    Katherine Rausch M.D.  Physical Medicine and Rehab

## 2022-06-28 ENCOUNTER — LAB VISIT (OUTPATIENT)
Dept: LAB | Facility: HOSPITAL | Age: 47
End: 2022-06-28
Attending: ORTHOPAEDIC SURGERY
Payer: COMMERCIAL

## 2022-06-28 ENCOUNTER — OFFICE VISIT (OUTPATIENT)
Dept: ORTHOPEDICS | Facility: CLINIC | Age: 47
End: 2022-06-28
Payer: COMMERCIAL

## 2022-06-28 VITALS — BODY MASS INDEX: 34.66 KG/M2 | WEIGHT: 208 LBS | HEIGHT: 65 IN

## 2022-06-28 DIAGNOSIS — G56.03 CARPAL TUNNEL SYNDROME ON BOTH SIDES: Primary | ICD-10-CM

## 2022-06-28 DIAGNOSIS — Z01.818 PREOP TESTING: ICD-10-CM

## 2022-06-28 DIAGNOSIS — Z01.818 PREOP TESTING: Primary | ICD-10-CM

## 2022-06-28 LAB
BASOPHILS # BLD AUTO: 0.05 K/UL (ref 0–0.2)
BASOPHILS NFR BLD: 0.7 % (ref 0–1.9)
DIFFERENTIAL METHOD: ABNORMAL
EOSINOPHIL # BLD AUTO: 0.1 K/UL (ref 0–0.5)
EOSINOPHIL NFR BLD: 0.7 % (ref 0–8)
ERYTHROCYTE [DISTWIDTH] IN BLOOD BY AUTOMATED COUNT: 13.1 % (ref 11.5–14.5)
HCT VFR BLD AUTO: 40 % (ref 37–48.5)
HGB BLD-MCNC: 12.4 G/DL (ref 12–16)
IMM GRANULOCYTES # BLD AUTO: 0.02 K/UL (ref 0–0.04)
IMM GRANULOCYTES NFR BLD AUTO: 0.3 % (ref 0–0.5)
LYMPHOCYTES # BLD AUTO: 2 K/UL (ref 1–4.8)
LYMPHOCYTES NFR BLD: 29.5 % (ref 18–48)
MCH RBC QN AUTO: 28.8 PG (ref 27–31)
MCHC RBC AUTO-ENTMCNC: 31 G/DL (ref 32–36)
MCV RBC AUTO: 93 FL (ref 82–98)
MONOCYTES # BLD AUTO: 0.4 K/UL (ref 0.3–1)
MONOCYTES NFR BLD: 6.2 % (ref 4–15)
NEUTROPHILS # BLD AUTO: 4.2 K/UL (ref 1.8–7.7)
NEUTROPHILS NFR BLD: 62.6 % (ref 38–73)
NRBC BLD-RTO: 0 /100 WBC
PLATELET # BLD AUTO: 320 K/UL (ref 150–450)
PMV BLD AUTO: 10.7 FL (ref 9.2–12.9)
RBC # BLD AUTO: 4.3 M/UL (ref 4–5.4)
WBC # BLD AUTO: 6.78 K/UL (ref 3.9–12.7)

## 2022-06-28 PROCEDURE — 1160F PR REVIEW ALL MEDS BY PRESCRIBER/CLIN PHARMACIST DOCUMENTED: ICD-10-PCS | Mod: CPTII,S$GLB,, | Performed by: ORTHOPAEDIC SURGERY

## 2022-06-28 PROCEDURE — 99214 OFFICE O/P EST MOD 30 MIN: CPT | Mod: S$GLB,,, | Performed by: ORTHOPAEDIC SURGERY

## 2022-06-28 PROCEDURE — 99214 PR OFFICE/OUTPT VISIT, EST, LEVL IV, 30-39 MIN: ICD-10-PCS | Mod: S$GLB,,, | Performed by: ORTHOPAEDIC SURGERY

## 2022-06-28 PROCEDURE — 3008F BODY MASS INDEX DOCD: CPT | Mod: CPTII,S$GLB,, | Performed by: ORTHOPAEDIC SURGERY

## 2022-06-28 PROCEDURE — 85025 COMPLETE CBC W/AUTO DIFF WBC: CPT | Performed by: ORTHOPAEDIC SURGERY

## 2022-06-28 PROCEDURE — 1160F RVW MEDS BY RX/DR IN RCRD: CPT | Mod: CPTII,S$GLB,, | Performed by: ORTHOPAEDIC SURGERY

## 2022-06-28 PROCEDURE — 36415 COLL VENOUS BLD VENIPUNCTURE: CPT | Performed by: ORTHOPAEDIC SURGERY

## 2022-06-28 PROCEDURE — 99999 PR PBB SHADOW E&M-EST. PATIENT-LVL III: ICD-10-PCS | Mod: PBBFAC,,, | Performed by: ORTHOPAEDIC SURGERY

## 2022-06-28 PROCEDURE — 80053 COMPREHEN METABOLIC PANEL: CPT | Performed by: ORTHOPAEDIC SURGERY

## 2022-06-28 PROCEDURE — 99999 PR PBB SHADOW E&M-EST. PATIENT-LVL III: CPT | Mod: PBBFAC,,, | Performed by: ORTHOPAEDIC SURGERY

## 2022-06-28 PROCEDURE — 83036 HEMOGLOBIN GLYCOSYLATED A1C: CPT | Performed by: ORTHOPAEDIC SURGERY

## 2022-06-28 PROCEDURE — 1159F PR MEDICATION LIST DOCUMENTED IN MEDICAL RECORD: ICD-10-PCS | Mod: CPTII,S$GLB,, | Performed by: ORTHOPAEDIC SURGERY

## 2022-06-28 PROCEDURE — 1159F MED LIST DOCD IN RCRD: CPT | Mod: CPTII,S$GLB,, | Performed by: ORTHOPAEDIC SURGERY

## 2022-06-28 PROCEDURE — 3008F PR BODY MASS INDEX (BMI) DOCUMENTED: ICD-10-PCS | Mod: CPTII,S$GLB,, | Performed by: ORTHOPAEDIC SURGERY

## 2022-06-28 NOTE — H&P (VIEW-ONLY)
Subjective:     Patient ID: Josh Simental is a 47 y.o. female.    Chief Complaint: Pain and Numbness of the Right Hand; Pain and Numbness of the Left Hand; Pain, Numbness, and Swelling of the Left Wrist; and Pain, Numbness, and Swelling of the Right Wrist      HPI:  The patient is a 47-year-old female with bilateral carpal tunnel syndrome documented by nerve conduction studies.  The numbness is constant rather than intermittent.  She wishes to have a right carpal tunnel release.    Past Medical History:   Diagnosis Date    Allergy     Anxiety     Depression     Endometriosis     Endometriosis 2019 10:14:15 AM    [facility] Historical - DO NOT USE: Endometriosis-No Additional Notes    Migraines     Other depressive disorder 2019 10:12:51 AM    [facility] Historical - [facility]: Depression-No Additional Notes    Pure hypercholesterolemia 2021 9:34:13 AM    [facility] Historical - Endocrine/Metabolic/Immune: Hypercholesterolemia-No Additional Notes    Sinusitis      Past Surgical History:   Procedure Laterality Date    CHOLECYSTECTOMY  2017    EYE SURGERY      LASIK    GALLBLADDER SURGERY  2017    LAPAROSCOPY  1994    dx 2/2 endometriosis    TUBAL LIGATION       Family History   Problem Relation Age of Onset    Diabetes Mother     Thyroid disease Mother     Diabetes Father     Cancer Father         Renal     Cancer Maternal Grandmother         Cervical     Diabetes Maternal Grandfather     Cancer Maternal Grandfather         Prostate    Stroke Paternal Grandmother      Social History     Socioeconomic History    Marital status:    Tobacco Use    Smoking status: Former Smoker     Packs/day: 0.25     Years: 2.00     Pack years: 0.50     Types: Cigarettes     Quit date: 1999     Years since quittin.0    Smokeless tobacco: Never Used    Tobacco comment: maybe 1 pk a week    Substance and Sexual Activity    Alcohol use: No    Drug use: No    Sexual  activity: Yes     Partners: Male     Birth control/protection: None     Social Determinants of Health     Financial Resource Strain: Low Risk     Difficulty of Paying Living Expenses: Not hard at all   Food Insecurity: No Food Insecurity    Worried About Running Out of Food in the Last Year: Never true    Ran Out of Food in the Last Year: Never true   Transportation Needs: No Transportation Needs    Lack of Transportation (Medical): No    Lack of Transportation (Non-Medical): No   Physical Activity: Insufficiently Active    Days of Exercise per Week: 4 days    Minutes of Exercise per Session: 30 min   Stress: No Stress Concern Present    Feeling of Stress : Only a little   Social Connections: Unknown    Frequency of Communication with Friends and Family: More than three times a week    Frequency of Social Gatherings with Friends and Family: Twice a week    Active Member of Clubs or Organizations: Yes    Attends Club or Organization Meetings: More than 4 times per year    Marital Status:    Housing Stability: Low Risk     Unable to Pay for Housing in the Last Year: No    Number of Places Lived in the Last Year: 1    Unstable Housing in the Last Year: No     Medication List with Changes/Refills   Current Medications    BLOOD SUGAR DIAGNOSTIC STRP    To check BG 2 times daily, to use with insurance preferred meter    BLOOD-GLUCOSE METER KIT    To check BG 2 times daily, to use with insurance preferred meter    ESOMEPRAZOLE MAGNESIUM 20 MG TBEC    Take 20 mg by mouth 2 (two) times daily.    ESTRADIOL-LEVONORGESTREL (CLIMARAPRO) 0.045-0.015 MG/24 HR    Place 1 patch onto the skin once a week.    LACTOBACILLUS ACIDOPHILUS (PROBIOTIC ORAL)    Take 1 tablet by mouth once daily.    LANCETS MISC    To check BG 2 times daily, to use with insurance preferred meter    METFORMIN (GLUCOPHAGE-XR) 500 MG ER 24HR TABLET    Take 1 tablet (500 mg total) by mouth daily with breakfast.     Review of patient's  allergies indicates:   Allergen Reactions    No known drug allergies      Review of Systems   Constitutional: Negative for malaise/fatigue.   HENT: Negative for hearing loss.    Eyes: Negative for double vision and visual disturbance.   Cardiovascular: Negative for chest pain.   Respiratory: Negative for shortness of breath.    Endocrine: Negative for cold intolerance.   Hematologic/Lymphatic: Does not bruise/bleed easily.   Skin: Negative for poor wound healing and suspicious lesions.   Musculoskeletal: Negative for gout, joint pain and joint swelling.   Gastrointestinal: Positive for hemorrhoids. Negative for nausea and vomiting.   Genitourinary: Negative for dysuria.   Neurological: Positive for headaches, numbness, paresthesias and sensory change.   Psychiatric/Behavioral: Positive for depression. Negative for memory loss and substance abuse. The patient is not nervous/anxious.    Allergic/Immunologic: Negative for persistent infections.       Objective:   Body mass index is 34.61 kg/m².  There were no vitals filed for this visit.             General    Constitutional: She is oriented to person, place, and time. She appears well-developed and well-nourished. No distress.   HENT:   Head: Normocephalic.   Mouth/Throat: Oropharynx is clear and moist.   Eyes: EOM are normal.   Cardiovascular: Normal rate.    Pulmonary/Chest: Effort normal.   Abdominal: Soft.   Neurological: She is alert and oriented to person, place, and time. No cranial nerve deficit.   Psychiatric: She has a normal mood and affect.             Right Hand/Wrist Exam     Inspection   Scars: Wrist - absent Hand -  absent  Effusion: Wrist - absent Hand -  absent    Pain   Wrist - The patient exhibits pain of the flexor/pronator group.    Tests   Phalens sign: positive  Tinel's sign (median nerve): positive  Carpal Tunnel Compression Test: positive    Atrophy   Thenar:  negative  Intrinsic:  negative    Other     Neuorologic Exam    Median  Distribution: abnormal  Ulnar Distribution: normal  Radial Distribution: normal    Comments:  The patient had a positive Tinel and positive Phalen sign.  There is no thenar atrophy noted      Left Hand/Wrist Exam     Inspection   Scars: Wrist - absent Hand -  absent  Effusion: Wrist - absent Hand -  absent    Pain   Wrist - The patient exhibits pain of the flexor/pronator group.    Tests   Phalens sign: positive  Tinel's sign (median nerve): positive  Carpal Tunnel Compression Test: positive    Atrophy  Thenar:  Negative  Intrinsic: negative    Other     Sensory Exam  Median Distribution: abnormal  Ulnar Distribution: normal  Radial Distribution: normal    Comments:  The patient had a positive Tinel and positive Phalen sign.  There is no thenar atrophy noted.          Vascular Exam       Capillary Refill  Right Hand: normal capillary refill  Left Hand: normal capillary refill               radiographs were not obtained today  Assessment:     Encounter Diagnosis   Name Primary?    Carpal tunnel syndrome on both sides Yes        Plan:     The patient wishes to have a right carpal tunnel release.  She was counseled regarding the surgery.  Risk complications and alternatives were discussed including the risk of infection, anesthetic risk, injury to nerves and vessels, loss of motion, and possible need for additional surgeries were discussed.  She seems to understand and agree that surgery.  All questions were answered.                Disclaimer: This note was prepared using a voice recognition system and is likely to have sound alike errors within the text.

## 2022-06-28 NOTE — PROGRESS NOTES
Subjective:     Patient ID: Josh Simental is a 47 y.o. female.    Chief Complaint: Pain and Numbness of the Right Hand; Pain and Numbness of the Left Hand; Pain, Numbness, and Swelling of the Left Wrist; and Pain, Numbness, and Swelling of the Right Wrist      HPI:  The patient is a 47-year-old female with bilateral carpal tunnel syndrome documented by nerve conduction studies.  The numbness is constant rather than intermittent.  She wishes to have a right carpal tunnel release.    Past Medical History:   Diagnosis Date    Allergy     Anxiety     Depression     Endometriosis     Endometriosis 2019 10:14:15 AM    [facility] Historical - DO NOT USE: Endometriosis-No Additional Notes    Migraines     Other depressive disorder 2019 10:12:51 AM    [facility] Historical - [facility]: Depression-No Additional Notes    Pure hypercholesterolemia 2021 9:34:13 AM    [facility] Historical - Endocrine/Metabolic/Immune: Hypercholesterolemia-No Additional Notes    Sinusitis      Past Surgical History:   Procedure Laterality Date    CHOLECYSTECTOMY  2017    EYE SURGERY      LASIK    GALLBLADDER SURGERY  2017    LAPAROSCOPY  1994    dx 2/2 endometriosis    TUBAL LIGATION       Family History   Problem Relation Age of Onset    Diabetes Mother     Thyroid disease Mother     Diabetes Father     Cancer Father         Renal     Cancer Maternal Grandmother         Cervical     Diabetes Maternal Grandfather     Cancer Maternal Grandfather         Prostate    Stroke Paternal Grandmother      Social History     Socioeconomic History    Marital status:    Tobacco Use    Smoking status: Former Smoker     Packs/day: 0.25     Years: 2.00     Pack years: 0.50     Types: Cigarettes     Quit date: 1999     Years since quittin.0    Smokeless tobacco: Never Used    Tobacco comment: maybe 1 pk a week    Substance and Sexual Activity    Alcohol use: No    Drug use: No    Sexual  activity: Yes     Partners: Male     Birth control/protection: None     Social Determinants of Health     Financial Resource Strain: Low Risk     Difficulty of Paying Living Expenses: Not hard at all   Food Insecurity: No Food Insecurity    Worried About Running Out of Food in the Last Year: Never true    Ran Out of Food in the Last Year: Never true   Transportation Needs: No Transportation Needs    Lack of Transportation (Medical): No    Lack of Transportation (Non-Medical): No   Physical Activity: Insufficiently Active    Days of Exercise per Week: 4 days    Minutes of Exercise per Session: 30 min   Stress: No Stress Concern Present    Feeling of Stress : Only a little   Social Connections: Unknown    Frequency of Communication with Friends and Family: More than three times a week    Frequency of Social Gatherings with Friends and Family: Twice a week    Active Member of Clubs or Organizations: Yes    Attends Club or Organization Meetings: More than 4 times per year    Marital Status:    Housing Stability: Low Risk     Unable to Pay for Housing in the Last Year: No    Number of Places Lived in the Last Year: 1    Unstable Housing in the Last Year: No     Medication List with Changes/Refills   Current Medications    BLOOD SUGAR DIAGNOSTIC STRP    To check BG 2 times daily, to use with insurance preferred meter    BLOOD-GLUCOSE METER KIT    To check BG 2 times daily, to use with insurance preferred meter    ESOMEPRAZOLE MAGNESIUM 20 MG TBEC    Take 20 mg by mouth 2 (two) times daily.    ESTRADIOL-LEVONORGESTREL (CLIMARAPRO) 0.045-0.015 MG/24 HR    Place 1 patch onto the skin once a week.    LACTOBACILLUS ACIDOPHILUS (PROBIOTIC ORAL)    Take 1 tablet by mouth once daily.    LANCETS MISC    To check BG 2 times daily, to use with insurance preferred meter    METFORMIN (GLUCOPHAGE-XR) 500 MG ER 24HR TABLET    Take 1 tablet (500 mg total) by mouth daily with breakfast.     Review of patient's  allergies indicates:   Allergen Reactions    No known drug allergies      Review of Systems   Constitutional: Negative for malaise/fatigue.   HENT: Negative for hearing loss.    Eyes: Negative for double vision and visual disturbance.   Cardiovascular: Negative for chest pain.   Respiratory: Negative for shortness of breath.    Endocrine: Negative for cold intolerance.   Hematologic/Lymphatic: Does not bruise/bleed easily.   Skin: Negative for poor wound healing and suspicious lesions.   Musculoskeletal: Negative for gout, joint pain and joint swelling.   Gastrointestinal: Positive for hemorrhoids. Negative for nausea and vomiting.   Genitourinary: Negative for dysuria.   Neurological: Positive for headaches, numbness, paresthesias and sensory change.   Psychiatric/Behavioral: Positive for depression. Negative for memory loss and substance abuse. The patient is not nervous/anxious.    Allergic/Immunologic: Negative for persistent infections.       Objective:   Body mass index is 34.61 kg/m².  There were no vitals filed for this visit.             General    Constitutional: She is oriented to person, place, and time. She appears well-developed and well-nourished. No distress.   HENT:   Head: Normocephalic.   Mouth/Throat: Oropharynx is clear and moist.   Eyes: EOM are normal.   Cardiovascular: Normal rate.    Pulmonary/Chest: Effort normal.   Abdominal: Soft.   Neurological: She is alert and oriented to person, place, and time. No cranial nerve deficit.   Psychiatric: She has a normal mood and affect.             Right Hand/Wrist Exam     Inspection   Scars: Wrist - absent Hand -  absent  Effusion: Wrist - absent Hand -  absent    Pain   Wrist - The patient exhibits pain of the flexor/pronator group.    Tests   Phalens sign: positive  Tinel's sign (median nerve): positive  Carpal Tunnel Compression Test: positive    Atrophy   Thenar:  negative  Intrinsic:  negative    Other     Neuorologic Exam    Median  Distribution: abnormal  Ulnar Distribution: normal  Radial Distribution: normal    Comments:  The patient had a positive Tinel and positive Phalen sign.  There is no thenar atrophy noted      Left Hand/Wrist Exam     Inspection   Scars: Wrist - absent Hand -  absent  Effusion: Wrist - absent Hand -  absent    Pain   Wrist - The patient exhibits pain of the flexor/pronator group.    Tests   Phalens sign: positive  Tinel's sign (median nerve): positive  Carpal Tunnel Compression Test: positive    Atrophy  Thenar:  Negative  Intrinsic: negative    Other     Sensory Exam  Median Distribution: abnormal  Ulnar Distribution: normal  Radial Distribution: normal    Comments:  The patient had a positive Tinel and positive Phalen sign.  There is no thenar atrophy noted.          Vascular Exam       Capillary Refill  Right Hand: normal capillary refill  Left Hand: normal capillary refill               radiographs were not obtained today  Assessment:     Encounter Diagnosis   Name Primary?    Carpal tunnel syndrome on both sides Yes        Plan:     The patient wishes to have a right carpal tunnel release.  She was counseled regarding the surgery.  Risk complications and alternatives were discussed including the risk of infection, anesthetic risk, injury to nerves and vessels, loss of motion, and possible need for additional surgeries were discussed.  She seems to understand and agree that surgery.  All questions were answered.                Disclaimer: This note was prepared using a voice recognition system and is likely to have sound alike errors within the text.

## 2022-06-29 ENCOUNTER — PATIENT MESSAGE (OUTPATIENT)
Dept: SURGERY | Facility: HOSPITAL | Age: 47
End: 2022-06-29
Payer: COMMERCIAL

## 2022-06-29 LAB
ALBUMIN SERPL BCP-MCNC: 4.2 G/DL (ref 3.5–5.2)
ALP SERPL-CCNC: 79 U/L (ref 55–135)
ALT SERPL W/O P-5'-P-CCNC: 15 U/L (ref 10–44)
ANION GAP SERPL CALC-SCNC: 10 MMOL/L (ref 8–16)
AST SERPL-CCNC: 15 U/L (ref 10–40)
BILIRUB SERPL-MCNC: 0.4 MG/DL (ref 0.1–1)
BUN SERPL-MCNC: 22 MG/DL (ref 6–20)
CALCIUM SERPL-MCNC: 9.8 MG/DL (ref 8.7–10.5)
CHLORIDE SERPL-SCNC: 103 MMOL/L (ref 95–110)
CO2 SERPL-SCNC: 26 MMOL/L (ref 23–29)
CREAT SERPL-MCNC: 0.8 MG/DL (ref 0.5–1.4)
EST. GFR  (AFRICAN AMERICAN): >60 ML/MIN/1.73 M^2
EST. GFR  (NON AFRICAN AMERICAN): >60 ML/MIN/1.73 M^2
ESTIMATED AVG GLUCOSE: 108 MG/DL (ref 68–131)
GLUCOSE SERPL-MCNC: 82 MG/DL (ref 70–110)
HBA1C MFR BLD: 5.4 % (ref 4–5.6)
POTASSIUM SERPL-SCNC: 4.9 MMOL/L (ref 3.5–5.1)
PROT SERPL-MCNC: 7.1 G/DL (ref 6–8.4)
SODIUM SERPL-SCNC: 139 MMOL/L (ref 136–145)

## 2022-06-30 ENCOUNTER — PATIENT MESSAGE (OUTPATIENT)
Dept: INTERNAL MEDICINE | Facility: CLINIC | Age: 47
End: 2022-06-30
Payer: COMMERCIAL

## 2022-06-30 ENCOUNTER — TELEPHONE (OUTPATIENT)
Dept: PREADMISSION TESTING | Facility: HOSPITAL | Age: 47
End: 2022-06-30
Payer: COMMERCIAL

## 2022-06-30 NOTE — TELEPHONE ENCOUNTER
Pre op instructions reviewed with pt per phone.    Spoke about pre op process and surgery instructions, verbalized understanding.    To confirm, Your surgeon has instructed you:  Surgery is scheduled on 7/14/2022.      Please report to [facility], 1st floor.    The address is [address] [address].  [place]  [id number]       The Pre Admissions will call you the day prior to surgery with your arrival time.        INSTRUCTIONS IMPORTANT!!!  Do Not Eat, Drink, or Smoke after 12 midnight! NO WATER after midnight! OK to brush teeth, no gum, candy or mints!        *Take only these medicines with a small swallow of water-morning of surgery.    None        ____  Do Not wear makeup, mascara nail polish or artificial nails  ____  NO powder, lotions, deodorants or creams to surgical area.  ____  Please remove all jewelry, including piercings and leave at home.  SURGERY WILL BE CANCELLED IF PIERCINGS ARE PRESENT!!!  ____  Dentures, Hearing Aids and Contact Lens will need to be removed prior to the start of surgery.  ____  Please bring photo ID and insurance information to hospital (Leave Valuables at Home)  ____  If going home the same day, arrange for a ride home. You will not be able to            drive if Anesthesia was used.    ____  Wear clean, loose fitting clothing. Allow for dressings, bandages.  ____  Stop Aspirin, Ibuprofen, Motrin and Aleve at least 5-7 days before surgery, unless otherwise instructed by your doctor, or the nurse. You MAY use Tylenol/acetaminophen until day of               surgery.  ____  If you take diabetic medication, do NOT take morning of surgery unless instructed by            Doctor. Metformin to be stopped 24 hrs prior to surgery time.   ____ Stop taking any Fish Oil supplements or Vitamins at least 5 days prior to surgery, unless instructed otherwise by your Doctor.         Bathing Instructions: The night before surgery and the morning prior to coming  to the hospital:              -Do not shave your face.  -Do not shave pubic hair 7 days prior to surgery (gyn pt's).  -Do not shave legs/underarms 3 days prior to surgery.              -Shower & Rinse your body as usual with anti-bacterial Soap (Dial, Lever 2000, or Hibiclens)              -Do not use hibiclens on your head, face, or genitals.              -Do not wash with anti-bacterial soap after you use the hibiclens.              -Rinse your body thoroughly.       Pediatric patients do not need to use anti-bacterial soap or Hibiclens.            [facility] Visitor/Ride Policy:  Only 1 adult allowed (over the age of 18) to accompany you into Pre-op/Recovery Surgery Dept and must stay through the entire length of admission.    Must have a ride home from a responsible adult that you know and trust.   Pediatric Patients are allowed 2 adult visitors.    Medical Transport, Uber or Lyft can only be used if patient has a responsible adult to accompany them during ride home.     Post-Op Instructions: You will receive surgery post-op instructions by your Discharge Nurse prior to going home.     Surgical Site Infection:     Prevention of surgical site infections:                 -Keep incisions clean and dry.              -Do not soak/submerge incisions in water until completely healed.              -Do not apply lotions, powders, creams, or deodorants to site.              -Always make sure hands are cleaned with antibacterial soap/ alcohol-based   prior to touching the surgical site.       Signs and symptoms:              -Redness and pain around the area where you had surgery              -Drainage of cloudy fluid from your surgical wound              -Fever over 100.4 or chills  >>>Call Surgeon office/on-call Surgeon if you experience any of these signs & symptoms post-surgery.        *Please Call [facility] Pre-[facility] Department with surgery instruction questions at [phone].     *Insurance Questions,  please call [phone].    Pre admit office to call afternoon prior to surgery with final arrival time.

## 2022-07-11 ENCOUNTER — LAB VISIT (OUTPATIENT)
Dept: PRIMARY CARE CLINIC | Facility: CLINIC | Age: 47
End: 2022-07-11
Payer: COMMERCIAL

## 2022-07-11 ENCOUNTER — ANESTHESIA EVENT (OUTPATIENT)
Dept: SURGERY | Facility: HOSPITAL | Age: 47
End: 2022-07-11
Payer: COMMERCIAL

## 2022-07-11 DIAGNOSIS — Z01.818 PREOP TESTING: ICD-10-CM

## 2022-07-11 LAB
SARS-COV-2 RNA RESP QL NAA+PROBE: NOT DETECTED
SARS-COV-2- CYCLE NUMBER: NORMAL

## 2022-07-11 PROCEDURE — U0005 INFEC AGEN DETEC AMPLI PROBE: HCPCS | Performed by: ORTHOPAEDIC SURGERY

## 2022-07-11 PROCEDURE — U0003 INFECTIOUS AGENT DETECTION BY NUCLEIC ACID (DNA OR RNA); SEVERE ACUTE RESPIRATORY SYNDROME CORONAVIRUS 2 (SARS-COV-2) (CORONAVIRUS DISEASE [COVID-19]), AMPLIFIED PROBE TECHNIQUE, MAKING USE OF HIGH THROUGHPUT TECHNOLOGIES AS DESCRIBED BY CMS-2020-01-R: HCPCS | Performed by: ORTHOPAEDIC SURGERY

## 2022-07-11 NOTE — DISCHARGE INSTRUCTIONS
Nozin Instructions  Goal: the goal of Nozin is to reduce the risk of post-procedural infections by bacteria in the nasal cavity. Think of it as hand  for your nose.    How to use:    1. Shake Nozin bottle well    2. Take a cotton swab and apply 4 drops to one tip    3. Insert cotton tip into one nostril, being sure not to go deeper into nose than tip of the swab.    4. Swab nostril 6 times counterclockwise and 6 times clockwise. Make sure to swab the inside front pocket of the nostril.    5. Take swab out and apply 2 drops to the same cotton tip. Repeat steps 3 and 4 in the other nostril.        Do steps 1-5 twice a day for 7 days.        After Hand Surgery  After surgery, the better you take care of yourself--especially your hand--the sooner it will heal. Follow your surgeons instructions. Try not to bump your hand, and dont move or lift anything while youre still wearing bandages, a splint, or a cast.  Care for your hand    Keep your hand elevated above heart level as much as possible for the first several days after surgery. This helps reduce swelling and pain.  To help prevent infection and speed healing, take care not to get your cast or bandages wet.  Relieve pain as directed  Your surgeon may prescribe pain medicine or suggest you take an anti-inflammatory medicine. You might also be instructed to apply ice (or another cold source) to your hand. If you use ice cubes, put them in a plastic bag and rest it on top of your bandages. Leave the cold source on your hand for as long as its comfortable. Do this several times a day for the first few days after surgery. It may take several minutes before you can feel the cold through the cast or bandages.  Follow up with your surgeon  During a follow-up visit after surgery, your surgeon will check your progress. The stitches, bandages, splint, or cast may be removed. A new cast or splint may be placed. If your hand has healed enough, your surgeon may  prescribe exercises.  Do prescribed hand exercises  Your surgeon may recommend that you do exercises. These may be done under the guidance of a physical or occupational therapist. The exercises strengthen your hand, help you regain flexibility, and restore proper function. Do the exercises as advised.  Call your surgeon if you have...  A fever higher than 100.4°F (38.0°C) taken by mouth  Side effects from your medicine, such as prolonged nausea  A wet or loose dressing, or a dressing that is too tight  Excessive bleeding  Increased, ongoing pain or numbness  Signs of infection (such as drainage, warmth, or redness) at the incision site   Date Last Reviewed: 11/11/2015  © 2695-0115 [employer]. [address] [place] [address], [place] [address]. All rights reserved. This information is not intended as a substitute for professional medical care. Always follow your healthcare professional's instructions.

## 2022-07-12 NOTE — ANESTHESIA PREPROCEDURE EVALUATION
07/12/2022  Josh Simental is a 47 y.o., female.  Past Medical History:   Diagnosis Date    Allergy     Anxiety     Depression     Endometriosis     Endometriosis 4/17/2019 10:14:15 AM    [facility] Historical - DO NOT USE: Endometriosis-No Additional Notes    Insulin resistance     Migraines     Other depressive disorder 4/17/2019 10:12:51 AM    [facility] Historical - [facility]: Depression-No Additional Notes    Pure hypercholesterolemia 6/30/2021 9:34:13 AM    [facility] Historical - Endocrine/Metabolic/Immune: Hypercholesterolemia-No Additional Notes    Sinusitis        Past Surgical History:   Procedure Laterality Date    CHOLECYSTECTOMY  07/2017    EYE SURGERY      LASIK    GALLBLADDER SURGERY  07/2017    LAPAROSCOPY  1994    dx 2/2 endometriosis    TUBAL LIGATION       '    Pre-op Assessment    I have reviewed the Patient Summary Reports.     I have reviewed the Nursing Notes. I have reviewed the NPO Status.   I have reviewed the Medications.     Review of Systems  Anesthesia Hx:  No problems with previous Anesthesia  Denies Family Hx of Anesthesia complications.   Denies Personal Hx of Anesthesia complications.   Social:  Former Smoker    Hematology/Oncology:  Hematology Normal        Cardiovascular:   Exercise tolerance: good Denies Hypertension.  Denies Dysrhythmias.  hyperlipidemia    Pulmonary:  Pulmonary Normal    Renal/:  Renal/ Normal     Hepatic/GI:  Hepatic/GI Normal    Musculoskeletal:   Carpal tunnel   Neurological:   Headaches    Endocrine:   Prediabetes. Obesity / BMI > 30  Psych:   Psychiatric History depression          Physical Exam  General: Well nourished, Cooperative, Alert and Oriented    Airway:  Mallampati: II   Mouth Opening: Normal  TM Distance: 4 - 6 cm  Tongue: Normal  Neck ROM: Normal ROM    Dental:  Intact    Chest/Lungs:  Clear to auscultation,  Normal Respiratory Rate    Heart:  Rate: Normal  Rhythm: Regular Rhythm      Wt Readings from Last 3 Encounters:   07/14/22 90.9 kg (200 lb 8.1 oz)   06/28/22 94.3 kg (208 lb)   06/22/22 94.3 kg (208 lb)     Temp Readings from Last 3 Encounters:   07/14/22 36.4 °C (97.6 °F) (Temporal)   05/12/22 36.4 °C (97.6 °F) (Tympanic)   04/28/22 37.1 °C (98.8 °F) (Tympanic)     BP Readings from Last 3 Encounters:   07/14/22 113/71   06/22/22 129/71   05/12/22 122/86     Pulse Readings from Last 3 Encounters:   07/14/22 69   06/22/22 87   05/12/22 75     Lab Results   Component Value Date    WBC 6.78 06/28/2022    HGB 12.4 06/28/2022    HCT 40.0 06/28/2022    MCV 93 06/28/2022     06/28/2022       CMP  Sodium   Date Value Ref Range Status   06/28/2022 139 136 - 145 mmol/L Final     Potassium   Date Value Ref Range Status   06/28/2022 4.9 3.5 - 5.1 mmol/L Final     Chloride   Date Value Ref Range Status   06/28/2022 103 95 - 110 mmol/L Final     CO2   Date Value Ref Range Status   06/28/2022 26 23 - 29 mmol/L Final     Glucose   Date Value Ref Range Status   06/28/2022 82 70 - 110 mg/dL Final     BUN   Date Value Ref Range Status   06/28/2022 22 (H) 6 - 20 mg/dL Final     Creatinine   Date Value Ref Range Status   06/28/2022 0.8 0.5 - 1.4 mg/dL Final     Calcium   Date Value Ref Range Status   06/28/2022 9.8 8.7 - 10.5 mg/dL Final     Total Protein   Date Value Ref Range Status   06/28/2022 7.1 6.0 - 8.4 g/dL Final     Albumin   Date Value Ref Range Status   06/28/2022 4.2 3.5 - 5.2 g/dL Final     Total Bilirubin   Date Value Ref Range Status   06/28/2022 0.4 0.1 - 1.0 mg/dL Final     Comment:     For infants and newborns, interpretation of results should be based  on gestational age, weight and in agreement with clinical  observations.    Premature Infant recommended reference ranges:  Up to 24 hours.............<8.0 mg/dL  Up to 48 hours............<12.0 mg/dL  3-5 days..................<15.0 mg/dL  6-29  days.................<15.0 mg/dL       Alkaline Phosphatase   Date Value Ref Range Status   06/28/2022 79 55 - 135 U/L Final     AST   Date Value Ref Range Status   06/28/2022 15 10 - 40 U/L Final     ALT   Date Value Ref Range Status   06/28/2022 15 10 - 44 U/L Final     Anion Gap   Date Value Ref Range Status   06/28/2022 10 8 - 16 mmol/L Final     eGFR if    Date Value Ref Range Status   06/28/2022 >60.0 >60 mL/min/1.73 m^2 Final     eGFR if non    Date Value Ref Range Status   06/28/2022 >60.0 >60 mL/min/1.73 m^2 Final     Comment:     Calculation used to obtain the estimated glomerular filtration  rate (eGFR) is the CKD-EPI equation.        7/11/2022 COVID negative  7/14/2022 UPT negative    Anesthesia Plan  Type of Anesthesia, risks & benefits discussed:    Anesthesia Type: MAC, Gen Natural Airway  Intra-op Monitoring Plan: Standard ASA Monitors  Post Op Pain Control Plan: multimodal analgesia and IV/PO Opioids PRN  Induction:  IV  Informed Consent: Informed consent signed with the Patient and all parties understand the risks and agree with anesthesia plan.  All questions answered.   ASA Score: 2  Day of Surgery Review of History & Physical: H&P Update referred to the surgeon/provider.    Ready For Surgery From Anesthesia Perspective.     .

## 2022-07-13 ENCOUNTER — TELEPHONE (OUTPATIENT)
Dept: PREADMISSION TESTING | Facility: HOSPITAL | Age: 47
End: 2022-07-13
Payer: COMMERCIAL

## 2022-07-14 ENCOUNTER — HOSPITAL ENCOUNTER (OUTPATIENT)
Facility: HOSPITAL | Age: 47
Discharge: HOME | End: 2022-07-14
Attending: ORTHOPAEDIC SURGERY | Admitting: ORTHOPAEDIC SURGERY
Payer: COMMERCIAL

## 2022-07-14 ENCOUNTER — ANESTHESIA (OUTPATIENT)
Dept: SURGERY | Facility: HOSPITAL | Age: 47
End: 2022-07-14
Payer: COMMERCIAL

## 2022-07-14 ENCOUNTER — PATIENT MESSAGE (OUTPATIENT)
Dept: ORTHOPEDICS | Facility: CLINIC | Age: 47
End: 2022-07-14
Payer: COMMERCIAL

## 2022-07-14 VITALS
HEIGHT: 65 IN | WEIGHT: 200.5 LBS | SYSTOLIC BLOOD PRESSURE: 102 MMHG | DIASTOLIC BLOOD PRESSURE: 55 MMHG | OXYGEN SATURATION: 99 % | HEART RATE: 55 BPM | RESPIRATION RATE: 16 BRPM | TEMPERATURE: 98 F | BODY MASS INDEX: 33.41 KG/M2

## 2022-07-14 DIAGNOSIS — G56.01 RIGHT CARPAL TUNNEL SYNDROME: Primary | ICD-10-CM

## 2022-07-14 DIAGNOSIS — G56.02 CARPAL TUNNEL SYNDROME OF LEFT WRIST: ICD-10-CM

## 2022-07-14 LAB
B-HCG UR QL: NEGATIVE
CTP QC/QA: YES
POCT GLUCOSE: 112 MG/DL (ref 70–110)

## 2022-07-14 PROCEDURE — 71000015 HC POSTOP RECOV 1ST HR: Performed by: ORTHOPAEDIC SURGERY

## 2022-07-14 PROCEDURE — 63600175 PHARM REV CODE 636 W HCPCS: Performed by: NURSE ANESTHETIST, CERTIFIED REGISTERED

## 2022-07-14 PROCEDURE — 63600175 PHARM REV CODE 636 W HCPCS: Performed by: ANESTHESIOLOGY

## 2022-07-14 PROCEDURE — D9220A PRA ANESTHESIA: ICD-10-PCS | Mod: CRNA,,, | Performed by: NURSE ANESTHETIST, CERTIFIED REGISTERED

## 2022-07-14 PROCEDURE — 37000008 HC ANESTHESIA 1ST 15 MINUTES: Performed by: ORTHOPAEDIC SURGERY

## 2022-07-14 PROCEDURE — D9220A PRA ANESTHESIA: ICD-10-PCS | Mod: ANES,,, | Performed by: ANESTHESIOLOGY

## 2022-07-14 PROCEDURE — D9220A PRA ANESTHESIA: Mod: CRNA,,, | Performed by: NURSE ANESTHETIST, CERTIFIED REGISTERED

## 2022-07-14 PROCEDURE — 36000707: Performed by: ORTHOPAEDIC SURGERY

## 2022-07-14 PROCEDURE — 81025 URINE PREGNANCY TEST: CPT | Performed by: ORTHOPAEDIC SURGERY

## 2022-07-14 PROCEDURE — 36000706: Performed by: ORTHOPAEDIC SURGERY

## 2022-07-14 PROCEDURE — 37000009 HC ANESTHESIA EA ADD 15 MINS: Performed by: ORTHOPAEDIC SURGERY

## 2022-07-14 PROCEDURE — 71000033 HC RECOVERY, INTIAL HOUR: Performed by: ORTHOPAEDIC SURGERY

## 2022-07-14 PROCEDURE — 25000003 PHARM REV CODE 250

## 2022-07-14 PROCEDURE — D9220A PRA ANESTHESIA: Mod: ANES,,, | Performed by: ANESTHESIOLOGY

## 2022-07-14 PROCEDURE — 25000003 PHARM REV CODE 250: Performed by: ORTHOPAEDIC SURGERY

## 2022-07-14 PROCEDURE — 25000003 PHARM REV CODE 250: Performed by: NURSE ANESTHETIST, CERTIFIED REGISTERED

## 2022-07-14 PROCEDURE — 82962 GLUCOSE BLOOD TEST: CPT | Performed by: ORTHOPAEDIC SURGERY

## 2022-07-14 PROCEDURE — 64721 CARPAL TUNNEL SURGERY: CPT | Mod: LT,,, | Performed by: ORTHOPAEDIC SURGERY

## 2022-07-14 PROCEDURE — 64721 PR REVISE MEDIAN N/CARPAL TUNNEL SURG: ICD-10-PCS | Mod: LT,,, | Performed by: ORTHOPAEDIC SURGERY

## 2022-07-14 PROCEDURE — 63600175 PHARM REV CODE 636 W HCPCS

## 2022-07-14 RX ORDER — FENTANYL CITRATE 50 UG/ML
INJECTION, SOLUTION INTRAMUSCULAR; INTRAVENOUS
Status: DISCONTINUED | OUTPATIENT
Start: 2022-07-14 | End: 2022-07-14

## 2022-07-14 RX ORDER — SODIUM CHLORIDE 0.9 % (FLUSH) 0.9 %
10 SYRINGE (ML) INJECTION
Status: DISCONTINUED | OUTPATIENT
Start: 2022-07-14 | End: 2022-07-14 | Stop reason: HOSPADM

## 2022-07-14 RX ORDER — SODIUM CHLORIDE, SODIUM LACTATE, POTASSIUM CHLORIDE, CALCIUM CHLORIDE 600; 310; 30; 20 MG/100ML; MG/100ML; MG/100ML; MG/100ML
INJECTION, SOLUTION INTRAVENOUS CONTINUOUS
Status: DISCONTINUED | OUTPATIENT
Start: 2022-07-14 | End: 2022-07-14 | Stop reason: HOSPADM

## 2022-07-14 RX ORDER — HYDROCODONE BITARTRATE AND ACETAMINOPHEN 5; 325 MG/1; MG/1
1 TABLET ORAL EVERY 6 HOURS PRN
Qty: 15 TABLET | Refills: 0 | Status: SHIPPED | OUTPATIENT
Start: 2022-07-14 | End: 2022-07-22

## 2022-07-14 RX ORDER — HYDROCODONE BITARTRATE AND ACETAMINOPHEN 5; 325 MG/1; MG/1
1 TABLET ORAL EVERY 4 HOURS PRN
Status: CANCELLED | OUTPATIENT
Start: 2022-07-14

## 2022-07-14 RX ORDER — HYDROMORPHONE HYDROCHLORIDE 2 MG/ML
0.2 INJECTION, SOLUTION INTRAMUSCULAR; INTRAVENOUS; SUBCUTANEOUS EVERY 5 MIN PRN
Status: DISCONTINUED | OUTPATIENT
Start: 2022-07-14 | End: 2022-07-14 | Stop reason: HOSPADM

## 2022-07-14 RX ORDER — LIDOCAINE HYDROCHLORIDE 20 MG/ML
INJECTION, SOLUTION EPIDURAL; INFILTRATION; INTRACAUDAL; PERINEURAL
Status: DISCONTINUED | OUTPATIENT
Start: 2022-07-14 | End: 2022-07-14 | Stop reason: HOSPADM

## 2022-07-14 RX ORDER — CHLORHEXIDINE GLUCONATE ORAL RINSE 1.2 MG/ML
10 SOLUTION DENTAL 2 TIMES DAILY
Status: CANCELLED | OUTPATIENT
Start: 2022-07-14 | End: 2022-07-19

## 2022-07-14 RX ORDER — PROPOFOL 10 MG/ML
VIAL (ML) INTRAVENOUS
Status: DISCONTINUED | OUTPATIENT
Start: 2022-07-14 | End: 2022-07-14

## 2022-07-14 RX ORDER — ONDANSETRON 2 MG/ML
4 INJECTION INTRAMUSCULAR; INTRAVENOUS DAILY PRN
Status: DISCONTINUED | OUTPATIENT
Start: 2022-07-14 | End: 2022-07-14 | Stop reason: HOSPADM

## 2022-07-14 RX ORDER — CHLORHEXIDINE GLUCONATE ORAL RINSE 1.2 MG/ML
10 SOLUTION DENTAL
Status: DISPENSED | OUTPATIENT
Start: 2022-07-14

## 2022-07-14 RX ORDER — CEFAZOLIN SODIUM 2 G/50ML
2 SOLUTION INTRAVENOUS
Status: COMPLETED | OUTPATIENT
Start: 2022-07-14 | End: 2022-07-14

## 2022-07-14 RX ORDER — MIDAZOLAM HYDROCHLORIDE 1 MG/ML
INJECTION, SOLUTION INTRAMUSCULAR; INTRAVENOUS
Status: DISCONTINUED | OUTPATIENT
Start: 2022-07-14 | End: 2022-07-14

## 2022-07-14 RX ORDER — ONDANSETRON 2 MG/ML
INJECTION INTRAMUSCULAR; INTRAVENOUS
Status: DISCONTINUED | OUTPATIENT
Start: 2022-07-14 | End: 2022-07-14

## 2022-07-14 RX ORDER — LIDOCAINE HYDROCHLORIDE 20 MG/ML
INJECTION, SOLUTION EPIDURAL; INFILTRATION; INTRACAUDAL; PERINEURAL
Status: DISCONTINUED | OUTPATIENT
Start: 2022-07-14 | End: 2022-07-14

## 2022-07-14 RX ADMIN — PROPOFOL 30 MG: 10 INJECTION, EMULSION INTRAVENOUS at 09:07

## 2022-07-14 RX ADMIN — ONDANSETRON 4 MG: 2 INJECTION, SOLUTION INTRAMUSCULAR; INTRAVENOUS at 09:07

## 2022-07-14 RX ADMIN — CEFAZOLIN SODIUM 2 G: 2 SOLUTION INTRAVENOUS at 08:07

## 2022-07-14 RX ADMIN — LIDOCAINE HYDROCHLORIDE 50 MG: 20 INJECTION, SOLUTION EPIDURAL; INFILTRATION; INTRACAUDAL; PERINEURAL at 08:07

## 2022-07-14 RX ADMIN — FENTANYL CITRATE 50 MCG: 50 INJECTION, SOLUTION INTRAMUSCULAR; INTRAVENOUS at 08:07

## 2022-07-14 RX ADMIN — MIDAZOLAM 2 MG: 1 INJECTION INTRAMUSCULAR; INTRAVENOUS at 08:07

## 2022-07-14 RX ADMIN — SODIUM CHLORIDE, SODIUM LACTATE, POTASSIUM CHLORIDE, AND CALCIUM CHLORIDE: .6; .31; .03; .02 INJECTION, SOLUTION INTRAVENOUS at 06:07

## 2022-07-14 RX ADMIN — CHLORHEXIDINE GLUCONATE 0.12% ORAL RINSE 10 ML: 1.2 LIQUID ORAL at 06:07

## 2022-07-14 NOTE — OP NOTE
[facility]  Orthopedic Surgery  Operative Note    SUMMARY     Date of Procedure: 7/14/2022   Assistant: None    Procedure: Procedure(s) (LRB):  RELEASE, CARPAL TUNNEL (Left)       Surgeon(s) and Role:     * Daryl Glover MD - Primary    Assisting Surgeon: None    Pre-Operative Diagnosis:  Left carpal tunnel syndrome    Post-Operative Diagnosis: Post-Op Diagnosis Codes:     Left carpal tunnel syndrome    Anesthesia:  Local with sedation    Technical Procedures Used:  left carpal tunnel release    Description of the Findings of the Procedure:  The patient was taken to the operating room where 10 cc of 2% plain lidocaine was used for local anesthetic and was administered.  After exsanguination of the extremity a proximal tourniquet was inflated to 250 mm of mercury.  Satisfactory anesthesia had been achieved the left hand was prepped and draped in the usual sterile fashion.  The patient did receive 2 g of Ancef intravenously preoperatively.  At this time a longitudinal incision was made in line with the 4th ray over the transverse carpal ligament.  The incision was extended using blunt and sharp dissection under 3.5 loupe magnification.  The transverse carpal ligament was identified and sharply incised under direct vision.  A complete release was performed and verified by the surgeon's small finger both proximally and distally.  No additional pathology was encountered.  Satisfactory release had been achieved skin was closed using horizontal mattress 4 0 nylon suture.  Xeroform gauze 4x4s and 2 ABD pads were over wrapped with 3 in gauze dressing.  The patient tolerated the procedure well and was transferred to the recovery room in satisfactory condition.      Complications: No    Estimated Blood Loss (EBL): 5cc                        Condition: Good    Disposition: PACU - hemodynamically stable.    Attestation: I was present and scrubbed for the entire procedure.

## 2022-07-14 NOTE — H&P
Subjective:     Patient ID: Josh Simental is a 47 y.o. female.    Chief Complaint: No chief complaint on file.      HPI:  The patient is a 47-year-old female with bilateral carpal tunnel syndrome documented by nerve conduction studies.  The numbness is constant rather than intermittent.  She wishes to have a left carpal tunnel release.    Past Medical History:   Diagnosis Date    Allergy     Anxiety     Depression     Endometriosis     Endometriosis 2019 10:14:15 AM    [facility] Historical - DO NOT USE: Endometriosis-No Additional Notes    Insulin resistance     Migraines     Other depressive disorder 2019 10:12:51 AM    [facility] Historical - [facility]: Depression-No Additional Notes    Pure hypercholesterolemia 2021 9:34:13 AM    [facility] Historical - Endocrine/Metabolic/Immune: Hypercholesterolemia-No Additional Notes    Sinusitis      Past Surgical History:   Procedure Laterality Date    CHOLECYSTECTOMY  2017    EYE SURGERY      LASIK    GALLBLADDER SURGERY  2017    LAPAROSCOPY      dx 2/2 endometriosis    TUBAL LIGATION       Family History   Problem Relation Age of Onset    Diabetes Mother     Thyroid disease Mother     Diabetes Father     Cancer Father         Renal     Cancer Maternal Grandmother         Cervical     Diabetes Maternal Grandfather     Cancer Maternal Grandfather         Prostate    Stroke Paternal Grandmother      Social History     Socioeconomic History    Marital status:    Tobacco Use    Smoking status: Former Smoker     Packs/day: 0.25     Years: 2.00     Pack years: 0.50     Types: Cigarettes     Quit date: 1999     Years since quittin.0    Smokeless tobacco: Never Used    Tobacco comment: maybe 1 pk a week    Substance and Sexual Activity    Alcohol use: Yes     Comment: rarely    Drug use: No    Sexual activity: Yes     Partners: Male     Birth control/protection: None     Social Determinants of Health      Financial Resource Strain: Low Risk     Difficulty of Paying Living Expenses: Not hard at all   Food Insecurity: No Food Insecurity    Worried About Running Out of Food in the Last Year: Never true    Ran Out of Food in the Last Year: Never true   Transportation Needs: No Transportation Needs    Lack of Transportation (Medical): No    Lack of Transportation (Non-Medical): No   Physical Activity: Insufficiently Active    Days of Exercise per Week: 4 days    Minutes of Exercise per Session: 30 min   Stress: No Stress Concern Present    Feeling of Stress : Only a little   Social Connections: Unknown    Frequency of Communication with Friends and Family: More than three times a week    Frequency of Social Gatherings with Friends and Family: Twice a week    Active Member of Clubs or Organizations: Yes    Attends Club or Organization Meetings: More than 4 times per year    Marital Status:    Housing Stability: Low Risk     Unable to Pay for Housing in the Last Year: No    Number of Places Lived in the Last Year: 1    Unstable Housing in the Last Year: No     Current Discharge Medication List      CONTINUE these medications which have NOT CHANGED    Details   blood sugar diagnostic Strp To check BG 2 times daily, to use with insurance preferred meter  Qty: 180 strip, Refills: 0      COLLAGEN MISC by Misc.(Non-Drug; Combo Route) route.      estradioL-levonorgestreL (CLIMARAPRO) 0.045-0.015 mg/24 hr Place 1 patch onto the skin once a week.  Qty: 4 patch, Refills: 11    Associated Diagnoses: Hot flashes due to menopause      LACTOBACILLUS ACIDOPHILUS (PROBIOTIC ORAL) Take 1 tablet by mouth once daily.      lancets Misc To check BG 2 times daily, to use with insurance preferred meter  Qty: 180 each, Refills: 0      metFORMIN (GLUCOPHAGE-XR) 500 MG ER 24hr tablet Take 1 tablet (500 mg total) by mouth daily with breakfast.  Qty: 90 tablet, Refills: 3      blood-glucose meter kit To check BG 2 times  daily, to use with insurance preferred meter  Qty: 1 each, Refills: 0           Review of patient's allergies indicates:   Allergen Reactions    No known drug allergies      Review of Systems   Constitutional: Negative for malaise/fatigue.   HENT: Negative for hearing loss.    Eyes: Negative for double vision and visual disturbance.   Cardiovascular: Negative for chest pain.   Respiratory: Negative for shortness of breath.    Endocrine: Negative for cold intolerance.   Hematologic/Lymphatic: Does not bruise/bleed easily.   Skin: Negative for poor wound healing and suspicious lesions.   Musculoskeletal: Negative for gout, joint pain and joint swelling.   Gastrointestinal: Positive for hemorrhoids. Negative for nausea and vomiting.   Genitourinary: Negative for dysuria.   Neurological: Positive for headaches, numbness, paresthesias and sensory change.   Psychiatric/Behavioral: Positive for depression. Negative for memory loss and substance abuse. The patient is not nervous/anxious.    Allergic/Immunologic: Negative for persistent infections.       Objective:   Body mass index is 33.37 kg/m².  Vitals:    07/14/22 0636   BP: 113/71   Pulse: 69   Resp: 16   Temp: 97.6 °F (36.4 °C)                General    Constitutional: She is oriented to person, place, and time. She appears well-developed and well-nourished. No distress.   HENT:   Head: Normocephalic.   Mouth/Throat: Oropharynx is clear and moist.   Eyes: EOM are normal.   Cardiovascular: Normal rate.    Pulmonary/Chest: Effort normal.   Abdominal: Soft.   Neurological: She is alert and oriented to person, place, and time. No cranial nerve deficit.   Psychiatric: She has a normal mood and affect.             Right Hand/Wrist Exam     Inspection   Scars: Wrist - absent Hand -  absent  Effusion: Wrist - absent Hand -  absent    Pain   Wrist - The patient exhibits pain of the flexor/pronator group.    Tests   Phalens sign: positive  Tinel's sign (median nerve):  positive  Carpal Tunnel Compression Test: positive    Atrophy   Thenar:  negative  Intrinsic:  negative    Other     Neuorologic Exam    Median Distribution: abnormal  Ulnar Distribution: normal  Radial Distribution: normal    Comments:  The patient had a positive Tinel and positive Phalen sign.  There is no thenar atrophy noted      Left Hand/Wrist Exam     Inspection   Scars: Wrist - absent Hand -  absent  Effusion: Wrist - absent Hand -  absent    Pain   Wrist - The patient exhibits pain of the flexor/pronator group.    Tests   Phalens sign: positive  Tinel's sign (median nerve): positive  Carpal Tunnel Compression Test: positive    Atrophy  Thenar:  Negative  Intrinsic: negative    Other     Sensory Exam  Median Distribution: abnormal  Ulnar Distribution: normal  Radial Distribution: normal    Comments:  The patient had a positive Tinel and positive Phalen sign.  There is no thenar atrophy noted.          Vascular Exam       Capillary Refill  Right Hand: normal capillary refill  Left Hand: normal capillary refill               radiographs were not obtained today  Assessment:     Encounter Diagnosis   Name Primary?    Carpal tunnel syndrome on both sides Yes        Plan:     The patient wishes to have a left carpal tunnel release.  She was counseled regarding the surgery.  Risk complications and alternatives were discussed including the risk of infection, anesthetic risk, injury to nerves and vessels, loss of motion, and possible need for additional surgeries were discussed.  She seems to understand and agree that surgery.  All questions were answered.                Disclaimer: This note was prepared using a voice recognition system and is likely to have sound alike errors within the text.

## 2022-07-14 NOTE — ANESTHESIA POSTPROCEDURE EVALUATION
Anesthesia Post Evaluation    Patient: Josh Simental    Procedure(s) Performed: Procedure(s) (LRB):  RELEASE, CARPAL TUNNEL (Left)    Final Anesthesia Type: MAC      Patient location during evaluation: PACU  Patient participation: Yes- Able to Participate  Level of consciousness: awake and alert  Post-procedure vital signs: reviewed and stable  Pain management: adequate  Airway patency: patent    PONV status at discharge: No PONV  Anesthetic complications: no      Cardiovascular status: hemodynamically stable  Respiratory status: unassisted and spontaneous ventilation  Hydration status: euvolemic  Follow-up not needed.          Vitals Value Taken Time   /55 07/14/22 0955   Temp 36.4 °C (97.6 °F) 07/14/22 0955   Pulse 55 07/14/22 0955   Resp 16 07/14/22 0955   SpO2 99 % 07/14/22 0955         Event Time   Out of Recovery 09:45:00         Pain/Juan Score: Juan Score: 10 (7/14/2022  9:55 AM)

## 2022-07-14 NOTE — TRANSFER OF CARE
"Anesthesia Transfer of Care Note    Patient: Josh Simental    Procedure(s) Performed: Procedure(s) (LRB):  RELEASE, CARPAL TUNNEL (Left)    Patient location: PACU    Anesthesia Type: general    Transport from OR: Transported from OR on room air with adequate spontaneous ventilation    Post pain: adequate analgesia    Post assessment: no apparent anesthetic complications and tolerated procedure well    Post vital signs: stable    Level of consciousness: awake, oriented and alert    Nausea/Vomiting: no nausea/vomiting    Complications: none    Transfer of care protocol was followed      Last vitals:   Visit Vitals  /71 (BP Location: Right arm, Patient Position: Sitting)   Pulse 69   Temp 36.4 °C (97.6 °F) (Temporal)   Resp 16   Ht 5' 5" (1.651 m)   Wt 90.9 kg (200 lb 8.1 oz)   LMP 07/10/2022 (Exact Date)   SpO2 99%   Breastfeeding No   BMI 33.37 kg/m²     "

## 2022-07-14 NOTE — DISCHARGE SUMMARY
[facility]  Discharge Note  Short Stay    Procedure(s) (LRB):  RELEASE, CARPAL TUNNEL (Left)    OUTCOME: Patient tolerated treatment/procedure well without complication and is now ready for discharge.    DISPOSITION: Home or Self Care    FINAL DIAGNOSIS:  Left carpal tunnel syndrome    FOLLOWUP: In clinic    DISCHARGE INSTRUCTIONS:    Discharge Procedure Orders   Diet general     Call MD for:  temperature >100.4     Call MD for:  persistent nausea and vomiting     Call MD for:  severe uncontrolled pain     Call MD for:  difficulty breathing, headache or visual disturbances     Call MD for:  redness, tenderness, or signs of infection (pain, swelling, redness, odor or green/yellow discharge around incision site)     Call MD for:  hives     Call MD for:  persistent dizziness or light-headedness     Call MD for:  extreme fatigue        TIME SPENT ON DISCHARGE:  20 minutes

## 2022-07-19 ENCOUNTER — OFFICE VISIT (OUTPATIENT)
Dept: ORTHOPEDICS | Facility: CLINIC | Age: 47
End: 2022-07-19
Payer: COMMERCIAL

## 2022-07-19 VITALS — HEIGHT: 65 IN | BODY MASS INDEX: 33.38 KG/M2 | WEIGHT: 200.38 LBS

## 2022-07-19 DIAGNOSIS — Z98.890 S/P CARPAL TUNNEL RELEASE: Primary | ICD-10-CM

## 2022-07-19 PROCEDURE — 99024 POSTOP FOLLOW-UP VISIT: CPT | Mod: S$GLB,,,

## 2022-07-19 PROCEDURE — 99999 PR PBB SHADOW E&M-EST. PATIENT-LVL III: CPT | Mod: PBBFAC,,,

## 2022-07-19 PROCEDURE — 1159F MED LIST DOCD IN RCRD: CPT | Mod: CPTII,S$GLB,,

## 2022-07-19 PROCEDURE — 99024 PR POST-OP FOLLOW-UP VISIT: ICD-10-PCS | Mod: S$GLB,,,

## 2022-07-19 PROCEDURE — 3044F HG A1C LEVEL LT 7.0%: CPT | Mod: CPTII,S$GLB,,

## 2022-07-19 PROCEDURE — 3008F PR BODY MASS INDEX (BMI) DOCUMENTED: ICD-10-PCS | Mod: CPTII,S$GLB,,

## 2022-07-19 PROCEDURE — 3008F BODY MASS INDEX DOCD: CPT | Mod: CPTII,S$GLB,,

## 2022-07-19 PROCEDURE — 3044F PR MOST RECENT HEMOGLOBIN A1C LEVEL <7.0%: ICD-10-PCS | Mod: CPTII,S$GLB,,

## 2022-07-19 PROCEDURE — 99999 PR PBB SHADOW E&M-EST. PATIENT-LVL III: ICD-10-PCS | Mod: PBBFAC,,,

## 2022-07-19 PROCEDURE — 1159F PR MEDICATION LIST DOCUMENTED IN MEDICAL RECORD: ICD-10-PCS | Mod: CPTII,S$GLB,,

## 2022-07-19 NOTE — PROGRESS NOTES
SUBJECTIVE:      Patient ID: Josh Simental is a 47 y.o. female.    HPI: Ms. Simental is here today for post-operative visit #1.  She is 5 days status post left carpal tunnel release by Dr. Glover on 7/14/22. She reports that she is doing well since surgery.  Pain is controlled, she states she took the Norco for 2 days after surgery and then stopped. She requests a letter to return to work Monday. She has been compliant with postop instructions and keeping the extremity dry. She denies fever, chills, and sweats since the time of the surgery.     Past Medical History:   Diagnosis Date    Allergy     Anxiety     Depression     Endometriosis     Endometriosis 4/17/2019 10:14:15 AM    [facility] [employer] [employer] Historical - DO NOT USE: Endometriosis-No Additional Notes    Insulin resistance     Migraines     Other depressive disorder 4/17/2019 10:12:51 AM    [facility] [employer] Historical - [facility]: Depression-No Additional Notes    Pure hypercholesterolemia 6/30/2021 9:34:13 AM    [facility] Historical - Endocrine/Metabolic/Immune: Hypercholesterolemia-No Additional Notes    Sinusitis      Past Surgical History:   Procedure Laterality Date    CARPAL TUNNEL RELEASE Left 7/14/2022    Procedure: RELEASE, CARPAL TUNNEL;  Surgeon: Daryl Glover MD;  Location: [facility];  Service: Orthopedics;  Laterality: Left;    CHOLECYSTECTOMY  07/2017    EYE SURGERY      LASIK    GALLBLADDER SURGERY  07/2017    LAPAROSCOPY  1994    dx 2/2 endometriosis    TUBAL LIGATION       Family History   Problem Relation Age of Onset    Diabetes Mother     Thyroid disease Mother     Diabetes Father     Cancer Father         Renal     Cancer Maternal Grandmother         Cervical     Diabetes Maternal Grandfather     Cancer Maternal Grandfather         Prostate    Stroke Paternal Grandmother      Social History     Socioeconomic History    Marital status:    Tobacco Use    Smoking status: Former Smoker     Packs/day: 0.25      Years: 2.00     Pack years: 0.50     Types: Cigarettes     Quit date: 1999     Years since quittin.1    Smokeless tobacco: Never Used    Tobacco comment: maybe 1 pk a week    Substance and Sexual Activity    Alcohol use: Yes     Comment: rarely    Drug use: No    Sexual activity: Yes     Partners: Male     Birth control/protection: None     Social Determinants of Health     Financial Resource Strain: Low Risk     Difficulty of Paying Living Expenses: Not hard at all   Food Insecurity: No Food Insecurity    Worried About Running Out of Food in the Last Year: Never true    Ran Out of Food in the Last Year: Never true   Transportation Needs: No Transportation Needs    Lack of Transportation (Medical): No    Lack of Transportation (Non-Medical): No   Physical Activity: Insufficiently Active    Days of Exercise per Week: 4 days    Minutes of Exercise per Session: 20 min   Stress: No Stress Concern Present    Feeling of Stress : Only a little   Social Connections: Unknown    Frequency of Communication with Friends and Family: Three times a week    Frequency of Social Gatherings with Friends and Family: Three times a week    Active Member of Clubs or Organizations: Yes    Attends Club or Organization Meetings: More than 4 times per year    Marital Status:    Housing Stability: Low Risk     Unable to Pay for Housing in the Last Year: No    Number of Places Lived in the Last Year: 1    Unstable Housing in the Last Year: No     Medication List with Changes/Refills   Current Medications    BLOOD SUGAR DIAGNOSTIC STRP    To check BG 2 times daily, to use with insurance preferred meter    BLOOD-GLUCOSE METER KIT    To check BG 2 times daily, to use with insurance preferred meter    COLLAGEN MISC    by Misc.(Non-Drug; Combo Route) route.    ESTRADIOL-LEVONORGESTREL (CLIMARAPRO) 0.045-0.015 MG/24 HR    Place 1 patch onto the skin once a week.    HYDROCODONE-ACETAMINOPHEN (NORCO) 5-325 MG  "PER TABLET    Take 1 tablet by mouth every 6 (six) hours as needed for Pain.    LACTOBACILLUS ACIDOPHILUS (PROBIOTIC ORAL)    Take 1 tablet by mouth once daily.    LANCETS MISC    To check BG 2 times daily, to use with insurance preferred meter    METFORMIN (GLUCOPHAGE-XR) 500 MG ER 24HR TABLET    Take 1 tablet (500 mg total) by mouth daily with breakfast.     Review of patient's allergies indicates:   Allergen Reactions    No known drug allergies        OBJECTIVE:     Physical exam:    Vitals:    07/19/22 1213   Weight: 90.9 kg (200 lb 6.4 oz)   Height: 5' 5" (1.651 m)   PainSc: 0-No pain     Vital signs are stable, patient is afebrile.  Patient is well dressed and well groomed, no acute distress.  Alert and oriented to person, place, and time.    Left UE:  Post op dressing taken down.   Wrist incision is clean, dry, and intact.   There is no erythema or exudate. There is no sign of any infection.   Mild bruising to palm and wrist  She is NVI.   Sutures in place.   2+ pulses noted.  Cap refill <2 seconds  Comp soft  Motor intact to hand and wrist    ASSESSMENT         Encounter Diagnosis   Name Primary?    S/P carpal tunnel release Yes          5 days status post left carpal tunnel release     PLAN:           Josh was seen today for post-op evaluation.    Diagnoses and all orders for this visit:    S/P carpal tunnel release      - PO instruction reviewed and provided to patient  - The incision was cleaned with hydrogen peroxide. A sterile Band-Aid was applied.   - Patient may clean the incision daily as above.   - Patient may use brace as needed for symptomatic relief.     -Return to work letter for Monday provided. Recommend wearing brace while at work  - Patient should notify the office of any signs or symptoms of infection including fevers, erythema, purulent drainage, increasing pain.    - Follow up for suture removal 7/28/22    Patient Instructions   POST OPERATIVE INSTRUCTIONS - Visit " #1    BANDAGES/DRESSING/BATHING:  We have removed the dressing, cleaned your incision, and put on a band-aid at your first post op visit today. You may clean the incision daily as we did today. Keep the incision clean and dry. When showering, you may cover the incision with a plastic bag/umbrella bag.   Do not use Neosporin or other topical ointments or creams on the incision. If you are concerned about any redness or drainage of the incisions, please call the office.    SWELLING  Some swelling of your arm, hand and fingers is normal. The swelling can be decreased by  elevating your arm on a few pillows when lying down. Swelling can be further controlled by cold therapy over the surgical site. Flexion/extension of the fingers (opening and closing your hands) will also help to relieve swelling and prevent stiffness.    ACTIVITY  You may use the hand and wrist as tolerated for light activity. You are encouraged to bend the wrist, elbow and fingers as soon as the initial surgical dressing is removed. Avoid lifting, pushing, or pulling any object greater than 5-10 pounds for the first 10-14 days.     BRACE  You may use a brace as needed for additional relief/support.    MEDICATIONS  Take pain medicines exactly as directed.   If the doctor gave you a prescription medicine for pain, take it as prescribed.   If you are not taking a prescription pain medicine, take an over-the-counter medicine such as acetaminophen (Tylenol), ibuprofen (Advil, Motrin), or naproxen (Aleve) as long as you do not have an allergy or medical condition that prevents you from taking them.   Do not take two or more pain medicines at the same time unless the doctor told you to. Many pain medicines have acetaminophen, which is Tylenol. Too much acetaminophen (Tylenol) can be harmful.    FOLLOW -UP 7/28/22 at 1:00  You should be scheduled for a post-op appointment within the 10-14 days following surgery, at which time we will review your surgery,  remove sutures and evaluate incisions, review your post-operative program and answer any of your questions.      Rose Santo PA-C   [facility]

## 2022-07-19 NOTE — PATIENT INSTRUCTIONS
POST OPERATIVE INSTRUCTIONS - Visit #1    BANDAGES/DRESSING/BATHING:  We have removed the dressing, cleaned your incision, and put on a band-aid at your first post op visit today. You may clean the incision daily as we did today. Keep the incision clean and dry. When showering, you may cover the incision with a plastic bag/umbrella bag.   Do not use Neosporin or other topical ointments or creams on the incision. If you are concerned about any redness or drainage of the incisions, please call the office.    SWELLING  Some swelling of your arm, hand and fingers is normal. The swelling can be decreased by  elevating your arm on a few pillows when lying down. Swelling can be further controlled by cold therapy over the surgical site. Flexion/extension of the fingers (opening and closing your hands) will also help to relieve swelling and prevent stiffness.    ACTIVITY  You may use the hand and wrist as tolerated for light activity. You are encouraged to bend the wrist, elbow and fingers as soon as the initial surgical dressing is removed. Avoid lifting, pushing, or pulling any object greater than 5-10 pounds for the first 10-14 days.     BRACE  You may use a brace as needed for additional relief/support.    MEDICATIONS  Take pain medicines exactly as directed.  If the doctor gave you a prescription medicine for pain, take it as prescribed.  If you are not taking a prescription pain medicine, take an over-the-counter medicine such as acetaminophen (Tylenol), ibuprofen (Advil, Motrin), or naproxen (Aleve) as long as you do not have an allergy or medical condition that prevents you from taking them.  Do not take two or more pain medicines at the same time unless the doctor told you to. Many pain medicines have acetaminophen, which is Tylenol. Too much acetaminophen (Tylenol) can be harmful.    FOLLOW -UP 7/28/22 at 1:00  You should be scheduled for a post-op appointment within the 10-14 days following surgery, at which time  we will review your surgery, remove sutures and evaluate incisions, review your post-operative program and answer any of your questions.

## 2022-07-22 ENCOUNTER — LAB VISIT (OUTPATIENT)
Dept: LAB | Facility: HOSPITAL | Age: 47
End: 2022-07-22
Attending: FAMILY MEDICINE
Payer: COMMERCIAL

## 2022-07-22 ENCOUNTER — OFFICE VISIT (OUTPATIENT)
Dept: INTERNAL MEDICINE | Facility: CLINIC | Age: 47
End: 2022-07-22
Payer: COMMERCIAL

## 2022-07-22 VITALS
OXYGEN SATURATION: 98 % | TEMPERATURE: 98 F | SYSTOLIC BLOOD PRESSURE: 110 MMHG | BODY MASS INDEX: 33.57 KG/M2 | DIASTOLIC BLOOD PRESSURE: 65 MMHG | HEIGHT: 65 IN | HEART RATE: 60 BPM | WEIGHT: 201.5 LBS

## 2022-07-22 DIAGNOSIS — Z00.00 ROUTINE ADULT HEALTH MAINTENANCE: ICD-10-CM

## 2022-07-22 DIAGNOSIS — E88.819 INSULIN RESISTANCE: ICD-10-CM

## 2022-07-22 DIAGNOSIS — Z00.00 ROUTINE ADULT HEALTH MAINTENANCE: Primary | ICD-10-CM

## 2022-07-22 LAB
CHOLEST SERPL-MCNC: 173 MG/DL (ref 120–199)
CHOLEST/HDLC SERPL: 2.9 {RATIO} (ref 2–5)
HDLC SERPL-MCNC: 59 MG/DL (ref 40–75)
HDLC SERPL: 34.1 % (ref 20–50)
LDLC SERPL CALC-MCNC: 95.8 MG/DL (ref 63–159)
NONHDLC SERPL-MCNC: 114 MG/DL
TRIGL SERPL-MCNC: 91 MG/DL (ref 30–150)

## 2022-07-22 PROCEDURE — 1159F PR MEDICATION LIST DOCUMENTED IN MEDICAL RECORD: ICD-10-PCS | Mod: CPTII,S$GLB,, | Performed by: FAMILY MEDICINE

## 2022-07-22 PROCEDURE — 3044F HG A1C LEVEL LT 7.0%: CPT | Mod: CPTII,S$GLB,, | Performed by: FAMILY MEDICINE

## 2022-07-22 PROCEDURE — 99396 PR PREVENTIVE VISIT,EST,40-64: ICD-10-PCS | Mod: S$GLB,,, | Performed by: FAMILY MEDICINE

## 2022-07-22 PROCEDURE — 99396 PREV VISIT EST AGE 40-64: CPT | Mod: S$GLB,,, | Performed by: FAMILY MEDICINE

## 2022-07-22 PROCEDURE — 3074F SYST BP LT 130 MM HG: CPT | Mod: CPTII,S$GLB,, | Performed by: FAMILY MEDICINE

## 2022-07-22 PROCEDURE — 3074F PR MOST RECENT SYSTOLIC BLOOD PRESSURE < 130 MM HG: ICD-10-PCS | Mod: CPTII,S$GLB,, | Performed by: FAMILY MEDICINE

## 2022-07-22 PROCEDURE — 99999 PR PBB SHADOW E&M-EST. PATIENT-LVL IV: ICD-10-PCS | Mod: PBBFAC,,, | Performed by: FAMILY MEDICINE

## 2022-07-22 PROCEDURE — 3044F PR MOST RECENT HEMOGLOBIN A1C LEVEL <7.0%: ICD-10-PCS | Mod: CPTII,S$GLB,, | Performed by: FAMILY MEDICINE

## 2022-07-22 PROCEDURE — 3078F PR MOST RECENT DIASTOLIC BLOOD PRESSURE < 80 MM HG: ICD-10-PCS | Mod: CPTII,S$GLB,, | Performed by: FAMILY MEDICINE

## 2022-07-22 PROCEDURE — 80061 LIPID PANEL: CPT | Performed by: FAMILY MEDICINE

## 2022-07-22 PROCEDURE — 3078F DIAST BP <80 MM HG: CPT | Mod: CPTII,S$GLB,, | Performed by: FAMILY MEDICINE

## 2022-07-22 PROCEDURE — 3008F BODY MASS INDEX DOCD: CPT | Mod: CPTII,S$GLB,, | Performed by: FAMILY MEDICINE

## 2022-07-22 PROCEDURE — 99999 PR PBB SHADOW E&M-EST. PATIENT-LVL IV: CPT | Mod: PBBFAC,,, | Performed by: FAMILY MEDICINE

## 2022-07-22 PROCEDURE — 36415 COLL VENOUS BLD VENIPUNCTURE: CPT | Mod: PO | Performed by: FAMILY MEDICINE

## 2022-07-22 PROCEDURE — 3008F PR BODY MASS INDEX (BMI) DOCUMENTED: ICD-10-PCS | Mod: CPTII,S$GLB,, | Performed by: FAMILY MEDICINE

## 2022-07-22 PROCEDURE — 1159F MED LIST DOCD IN RCRD: CPT | Mod: CPTII,S$GLB,, | Performed by: FAMILY MEDICINE

## 2022-07-22 RX ORDER — METFORMIN HYDROCHLORIDE 500 MG/1
500 TABLET, EXTENDED RELEASE ORAL
Qty: 90 TABLET | Refills: 3 | Status: SHIPPED | OUTPATIENT
Start: 2022-07-22 | End: 2023-06-28 | Stop reason: SDUPTHER

## 2022-07-22 RX ORDER — ESOMEPRAZOLE MAGNESIUM 40 MG/1
40 CAPSULE, DELAYED RELEASE ORAL
COMMUNITY
End: 2022-11-03

## 2022-07-22 NOTE — PROGRESS NOTES
Subjective:      Patient ID: Josh Simental is a 47 y.o. female.    Chief Complaint: Annual Exam      Patient here for routine annual exam and to establish care.  Had recent carpal tunnel release, doing fairly well after this, still having some pain.  On metformin for insulin resistance, has been on this for a couple years now, doing well      Review of Systems   Constitutional: Negative for activity change and unexpected weight change.   HENT: Negative for hearing loss, rhinorrhea and trouble swallowing.    Eyes: Negative for discharge and visual disturbance.   Respiratory: Negative for chest tightness and wheezing.    Cardiovascular: Negative for chest pain and palpitations.   Gastrointestinal: Negative for blood in stool, constipation, diarrhea and vomiting.   Endocrine: Negative for polydipsia and polyuria.   Genitourinary: Negative for difficulty urinating, dysuria, hematuria and menstrual problem.   Musculoskeletal: Negative for arthralgias, joint swelling and neck pain.   Neurological: Negative for weakness and headaches.   Psychiatric/Behavioral: Negative for confusion and dysphoric mood.     Past Medical History:   Diagnosis Date    Allergy     Anxiety     Depression     Endometriosis     Endometriosis 4/17/2019 10:14:15 AM    [employer] Historical - DO NOT USE: Endometriosis-No Additional Notes    Insulin resistance     Migraines     Other depressive disorder 4/17/2019 10:12:51 AM    [facility] [employer] Historical - [facility]: Depression-No Additional Notes    Pure hypercholesterolemia 6/30/2021 9:34:13 AM    [employer] Historical - Endocrine/Metabolic/Immune: Hypercholesterolemia-No Additional Notes    Sinusitis           Past Surgical History:   Procedure Laterality Date    CARPAL TUNNEL RELEASE Left 7/14/2022    Procedure: RELEASE, CARPAL TUNNEL;  Surgeon: Daryl Glover MD;  Location: [facility];  Service: Orthopedics;  Laterality: Left;    CHOLECYSTECTOMY  07/2017    EYE SURGERY       LASIK    GALLBLADDER SURGERY  2017    LAPAROSCOPY      dx 2/2 endometriosis    TUBAL LIGATION       Family History   Problem Relation Age of Onset    Diabetes Mother     Thyroid disease Mother     Diabetes Father     Cancer Father         Renal     Cancer Maternal Grandmother         Cervical     Diabetes Maternal Grandfather     Cancer Maternal Grandfather         Prostate    Stroke Paternal Grandmother      Social History     Socioeconomic History    Marital status:    Tobacco Use    Smoking status: Former Smoker     Packs/day: 0.25     Years: 2.00     Pack years: 0.50     Types: Cigarettes     Quit date: 1999     Years since quittin.1    Smokeless tobacco: Never Used    Tobacco comment: maybe 1 pk a week    Substance and Sexual Activity    Alcohol use: Yes     Comment: rarely    Drug use: No    Sexual activity: Yes     Partners: Male     Birth control/protection: None     Social Determinants of Health     Financial Resource Strain: Low Risk     Difficulty of Paying Living Expenses: Not hard at all   Food Insecurity: No Food Insecurity    Worried About Running Out of Food in the Last Year: Never true    Ran Out of Food in the Last Year: Never true   Transportation Needs: No Transportation Needs    Lack of Transportation (Medical): No    Lack of Transportation (Non-Medical): No   Physical Activity: Insufficiently Active    Days of Exercise per Week: 4 days    Minutes of Exercise per Session: 20 min   Stress: No Stress Concern Present    Feeling of Stress : Only a little   Social Connections: Unknown    Frequency of Communication with Friends and Family: Three times a week    Frequency of Social Gatherings with Friends and Family: Three times a week    Active Member of Clubs or Organizations: Yes    Attends Club or Organization Meetings: More than 4 times per year    Marital Status:    Housing Stability: Low Risk     Unable to Pay for Housing in the Last  "Year: No    Number of Places Lived in the Last Year: 1    Unstable Housing in the Last Year: No     Review of patient's allergies indicates:   Allergen Reactions    No known drug allergies        Objective:       /65 (BP Location: Right arm, Patient Position: Sitting, BP Method: Large (Automatic))   Pulse 60   Temp 97.7 °F (36.5 °C) (Tympanic)   Ht 5' 5" (1.651 m)   Wt 91.4 kg (201 lb 8 oz)   LMP 07/10/2022 (Exact Date)   SpO2 98%   BMI 33.53 kg/m²   Physical Exam  Vitals reviewed.   Constitutional:       General: She is not in acute distress.     Appearance: Normal appearance. She is well-developed. She is not ill-appearing or diaphoretic.   HENT:      Head: Normocephalic and atraumatic.      Right Ear: Hearing, tympanic membrane, ear canal and external ear normal.      Left Ear: Hearing, tympanic membrane, ear canal and external ear normal.      Nose: Nose normal.      Mouth/Throat:      Pharynx: Uvula midline. No oropharyngeal exudate.   Eyes:      Conjunctiva/sclera: Conjunctivae normal.      Pupils: Pupils are equal, round, and reactive to light.   Neck:      Thyroid: No thyromegaly.      Trachea: No tracheal deviation.   Cardiovascular:      Rate and Rhythm: Normal rate and regular rhythm.      Heart sounds: Normal heart sounds. No murmur heard.  Pulmonary:      Effort: Pulmonary effort is normal. No respiratory distress.      Breath sounds: Normal breath sounds.   Abdominal:      General: Bowel sounds are normal.      Palpations: Abdomen is soft.      Tenderness: There is no abdominal tenderness. There is no guarding.      Hernia: No hernia is present.   Musculoskeletal:         General: Normal range of motion.      Cervical back: Normal range of motion and neck supple.   Lymphadenopathy:      Cervical: No cervical adenopathy.   Skin:     General: Skin is warm and dry.      Capillary Refill: Capillary refill takes less than 2 seconds.   Neurological:      General: No focal deficit present.     "  Mental Status: She is alert and oriented to person, place, and time.   Psychiatric:         Mood and Affect: Mood normal.         Behavior: Behavior normal.         Thought Content: Thought content normal.         Judgment: Judgment normal.       Assessment:     1. Routine adult health maintenance    2. Insulin resistance      Plan:   Routine adult health maintenance  -     Lipid Panel; Future; Expected date: 07/22/2022    Insulin resistance    Other orders  -     metFORMIN (GLUCOPHAGE-XR) 500 MG ER 24hr tablet; Take 1 tablet (500 mg total) by mouth daily with breakfast.  Dispense: 90 tablet; Refill: 3    Reviewed labs drawn last month prior to her surgery-due for lipids  Medication List with Changes/Refills   Current Medications    BLOOD SUGAR DIAGNOSTIC STRP    To check BG 2 times daily, to use with insurance preferred meter    BLOOD-GLUCOSE METER KIT    To check BG 2 times daily, to use with insurance preferred meter    COLLAGEN MISC    by Misc.(Non-Drug; Combo Route) route.    ESOMEPRAZOLE (NEXIUM) 40 MG CAPSULE    Take 40 mg by mouth before breakfast.    ESTRADIOL-LEVONORGESTREL (CLIMARAPRO) 0.045-0.015 MG/24 HR    Place 1 patch onto the skin once a week.    LACTOBACILLUS ACIDOPHILUS (PROBIOTIC ORAL)    Take 1 tablet by mouth once daily.    LANCETS MISC    To check BG 2 times daily, to use with insurance preferred meter   Changed and/or Refilled Medications    Modified Medication Previous Medication    METFORMIN (GLUCOPHAGE-XR) 500 MG ER 24HR TABLET metFORMIN (GLUCOPHAGE-XR) 500 MG ER 24hr tablet       Take 1 tablet (500 mg total) by mouth daily with breakfast.    Take 1 tablet (500 mg total) by mouth daily with breakfast.   Discontinued Medications    HYDROCODONE-ACETAMINOPHEN (NORCO) 5-325 MG PER TABLET    Take 1 tablet by mouth every 6 (six) hours as needed for Pain.

## 2022-07-25 ENCOUNTER — OFFICE VISIT (OUTPATIENT)
Dept: ORTHOPEDICS | Facility: CLINIC | Age: 47
End: 2022-07-25
Payer: COMMERCIAL

## 2022-07-25 ENCOUNTER — HOSPITAL ENCOUNTER (OUTPATIENT)
Dept: RADIOLOGY | Facility: HOSPITAL | Age: 47
Discharge: HOME | End: 2022-07-25
Attending: ORTHOPAEDIC SURGERY
Payer: COMMERCIAL

## 2022-07-25 ENCOUNTER — PATIENT MESSAGE (OUTPATIENT)
Dept: ORTHOPEDICS | Facility: CLINIC | Age: 47
End: 2022-07-25

## 2022-07-25 VITALS — BODY MASS INDEX: 33.57 KG/M2 | WEIGHT: 201.5 LBS | HEIGHT: 65 IN

## 2022-07-25 DIAGNOSIS — M25.532 LEFT WRIST PAIN: ICD-10-CM

## 2022-07-25 DIAGNOSIS — G56.03 CARPAL TUNNEL SYNDROME ON BOTH SIDES: ICD-10-CM

## 2022-07-25 DIAGNOSIS — M25.522 LEFT ELBOW PAIN: ICD-10-CM

## 2022-07-25 DIAGNOSIS — Z98.890 S/P CARPAL TUNNEL RELEASE: Primary | ICD-10-CM

## 2022-07-25 DIAGNOSIS — M79.642 LEFT HAND PAIN: ICD-10-CM

## 2022-07-25 DIAGNOSIS — M79.642 LEFT HAND PAIN: Primary | ICD-10-CM

## 2022-07-25 PROCEDURE — 99024 PR POST-OP FOLLOW-UP VISIT: ICD-10-PCS | Mod: S$GLB,,, | Performed by: ORTHOPAEDIC SURGERY

## 2022-07-25 PROCEDURE — 73080 XR ELBOW COMPLETE 3 VIEW LEFT: ICD-10-PCS | Mod: 26,LT,, | Performed by: RADIOLOGY

## 2022-07-25 PROCEDURE — 1159F PR MEDICATION LIST DOCUMENTED IN MEDICAL RECORD: ICD-10-PCS | Mod: CPTII,S$GLB,, | Performed by: ORTHOPAEDIC SURGERY

## 2022-07-25 PROCEDURE — 1160F RVW MEDS BY RX/DR IN RCRD: CPT | Mod: CPTII,S$GLB,, | Performed by: ORTHOPAEDIC SURGERY

## 2022-07-25 PROCEDURE — 3008F BODY MASS INDEX DOCD: CPT | Mod: CPTII,S$GLB,, | Performed by: ORTHOPAEDIC SURGERY

## 2022-07-25 PROCEDURE — 99024 POSTOP FOLLOW-UP VISIT: CPT | Mod: S$GLB,,, | Performed by: ORTHOPAEDIC SURGERY

## 2022-07-25 PROCEDURE — 1159F MED LIST DOCD IN RCRD: CPT | Mod: CPTII,S$GLB,, | Performed by: ORTHOPAEDIC SURGERY

## 2022-07-25 PROCEDURE — 3044F PR MOST RECENT HEMOGLOBIN A1C LEVEL <7.0%: ICD-10-PCS | Mod: CPTII,S$GLB,, | Performed by: ORTHOPAEDIC SURGERY

## 2022-07-25 PROCEDURE — 73130 XR HAND COMPLETE 3 VIEW LEFT: ICD-10-PCS | Mod: 26,LT,, | Performed by: RADIOLOGY

## 2022-07-25 PROCEDURE — 73110 XR WRIST COMPLETE 3 VIEWS LEFT: ICD-10-PCS | Mod: 26,LT,, | Performed by: RADIOLOGY

## 2022-07-25 PROCEDURE — 99999 PR PBB SHADOW E&M-EST. PATIENT-LVL III: CPT | Mod: PBBFAC,,, | Performed by: ORTHOPAEDIC SURGERY

## 2022-07-25 PROCEDURE — 3044F HG A1C LEVEL LT 7.0%: CPT | Mod: CPTII,S$GLB,, | Performed by: ORTHOPAEDIC SURGERY

## 2022-07-25 PROCEDURE — 99999 PR PBB SHADOW E&M-EST. PATIENT-LVL III: ICD-10-PCS | Mod: PBBFAC,,, | Performed by: ORTHOPAEDIC SURGERY

## 2022-07-25 PROCEDURE — 73080 X-RAY EXAM OF ELBOW: CPT | Mod: TC,LT

## 2022-07-25 PROCEDURE — 73080 X-RAY EXAM OF ELBOW: CPT | Mod: 26,LT,, | Performed by: RADIOLOGY

## 2022-07-25 PROCEDURE — 73130 X-RAY EXAM OF HAND: CPT | Mod: TC,LT

## 2022-07-25 PROCEDURE — 73110 X-RAY EXAM OF WRIST: CPT | Mod: TC,LT

## 2022-07-25 PROCEDURE — 1160F PR REVIEW ALL MEDS BY PRESCRIBER/CLIN PHARMACIST DOCUMENTED: ICD-10-PCS | Mod: CPTII,S$GLB,, | Performed by: ORTHOPAEDIC SURGERY

## 2022-07-25 PROCEDURE — 73130 X-RAY EXAM OF HAND: CPT | Mod: 26,LT,, | Performed by: RADIOLOGY

## 2022-07-25 PROCEDURE — 3008F PR BODY MASS INDEX (BMI) DOCUMENTED: ICD-10-PCS | Mod: CPTII,S$GLB,, | Performed by: ORTHOPAEDIC SURGERY

## 2022-07-25 PROCEDURE — 73110 X-RAY EXAM OF WRIST: CPT | Mod: 26,LT,, | Performed by: RADIOLOGY

## 2022-07-25 NOTE — PROGRESS NOTES
Subjective:     Patient ID: Josh Simental is a 47 y.o. female.    Chief Complaint: Pain and Injury of the Left Wrist, Pain and Injury of the Left Elbow, and Pain and Injury of the Left Hand      HPI:  The patient is 47-year-old female status post left carpal tunnel release date of surgery was 7/14 /22. She seems to be doing well.  She reports for suture removal.  She apparently fell 07/23/2022 but radiographs left hand wrist and elbow are normal.  She says she wishes to do the right carpal tunnel shortly.    Past Medical History:   Diagnosis Date    Allergy     Anxiety     Depression     Endometriosis     Endometriosis 4/17/2019 10:14:15 AM    [facility] Historical - DO NOT USE: Endometriosis-No Additional Notes    Insulin resistance     Migraines     Other depressive disorder 4/17/2019 10:12:51 AM    [facility] - [facility]: Depression-No Additional Notes    Pure hypercholesterolemia 6/30/2021 9:34:13 AM    [facility] Historical - Endocrine/Metabolic/Immune: Hypercholesterolemia-No Additional Notes    Sinusitis      Past Surgical History:   Procedure Laterality Date    CARPAL TUNNEL RELEASE Left 7/14/2022    Procedure: RELEASE, CARPAL TUNNEL;  Surgeon: Daryl Glover MD;  Location: [facility];  Service: Orthopedics;  Laterality: Left;    CHOLECYSTECTOMY  07/2017    EYE SURGERY      LASIK    GALLBLADDER SURGERY  07/2017    LAPAROSCOPY  1994    dx 2/2 endometriosis    TUBAL LIGATION       Family History   Problem Relation Age of Onset    Diabetes Mother     Thyroid disease Mother     Diabetes Father     Cancer Father         Renal     Cancer Maternal Grandmother         Cervical     Diabetes Maternal Grandfather     Cancer Maternal Grandfather         Prostate    Stroke Paternal Grandmother      Social History     Socioeconomic History    Marital status:    Tobacco Use    Smoking status: Former Smoker     Packs/day: 0.25     Years: 2.00     Pack years: 0.50      Types: Cigarettes     Quit date: 1999     Years since quittin.1    Smokeless tobacco: Never Used    Tobacco comment: maybe 1 pk a week    Substance and Sexual Activity    Alcohol use: Yes     Comment: rarely    Drug use: No    Sexual activity: Yes     Partners: Male     Birth control/protection: None     Social Determinants of Health     Financial Resource Strain: Low Risk     Difficulty of Paying Living Expenses: Not hard at all   Food Insecurity: No Food Insecurity    Worried About Running Out of Food in the Last Year: Never true    Ran Out of Food in the Last Year: Never true   Transportation Needs: No Transportation Needs    Lack of Transportation (Medical): No    Lack of Transportation (Non-Medical): No   Physical Activity: Insufficiently Active    Days of Exercise per Week: 4 days    Minutes of Exercise per Session: 20 min   Stress: No Stress Concern Present    Feeling of Stress : Only a little   Social Connections: Unknown    Frequency of Communication with Friends and Family: Three times a week    Frequency of Social Gatherings with Friends and Family: Three times a week    Active Member of Clubs or Organizations: Yes    Attends Club or Organization Meetings: More than 4 times per year    Marital Status:    Housing Stability: Low Risk     Unable to Pay for Housing in the Last Year: No    Number of Places Lived in the Last Year: 1    Unstable Housing in the Last Year: No     Medication List with Changes/Refills   Current Medications    BLOOD SUGAR DIAGNOSTIC STRP    To check BG 2 times daily, to use with insurance preferred meter    BLOOD-GLUCOSE METER KIT    To check BG 2 times daily, to use with insurance preferred meter    COLLAGEN MISC    by Misc.(Non-Drug; Combo Route) route.    ESOMEPRAZOLE (NEXIUM) 40 MG CAPSULE    Take 40 mg by mouth before breakfast.    ESTRADIOL-LEVONORGESTREL (CLIMARAPRO) 0.045-0.015 MG/24 HR    Place 1 patch onto the skin once a week.     LACTOBACILLUS ACIDOPHILUS (PROBIOTIC ORAL)    Take 1 tablet by mouth once daily.    LANCETS MISC    To check BG 2 times daily, to use with insurance preferred meter    METFORMIN (GLUCOPHAGE-XR) 500 MG ER 24HR TABLET    Take 1 tablet (500 mg total) by mouth daily with breakfast.     Review of patient's allergies indicates:   Allergen Reactions    No known drug allergies      Review of Systems   Constitutional: Negative for malaise/fatigue.   HENT: Negative for hearing loss.    Eyes: Negative for double vision and visual disturbance.   Cardiovascular: Negative for chest pain.   Respiratory: Negative for shortness of breath.    Endocrine: Negative for cold intolerance.   Hematologic/Lymphatic: Does not bruise/bleed easily.   Skin: Negative for poor wound healing and suspicious lesions.   Musculoskeletal: Negative for gout, joint pain and joint swelling.   Gastrointestinal: Positive for hemorrhoids. Negative for nausea and vomiting.   Genitourinary: Negative for dysuria.   Neurological: Positive for headaches, numbness, paresthesias and sensory change.   Psychiatric/Behavioral: Positive for depression. Negative for memory loss and substance abuse. The patient is not nervous/anxious.    Allergic/Immunologic: Negative for persistent infections.       Objective:   Body mass index is 33.53 kg/m².  There were no vitals filed for this visit.             General    Constitutional: She is oriented to person, place, and time. She appears well-developed and well-nourished. No distress.   HENT:   Head: Normocephalic.   Eyes: EOM are normal.   Pulmonary/Chest: Effort normal.   Neurological: She is oriented to person, place, and time.   Psychiatric: She has a normal mood and affect.             Right Hand/Wrist Exam     Inspection   Scars: Wrist - absent Hand -  absent  Effusion: Wrist - absent Hand -  absent    Pain   Wrist - The patient exhibits pain of the flexor/pronator group.    Tests   Phalens sign: positive  Tinel's sign  (median nerve): positive  Carpal Tunnel Compression Test: positive    Atrophy   Thenar:  negative  Intrinsic:  negative    Other     Neuorologic Exam    Median Distribution: abnormal  Ulnar Distribution: normal  Radial Distribution: normal      Left Hand/Wrist Exam     Inspection   Scars: Wrist - present Hand -  present  Effusion: Wrist - absent Hand -  absent    Other     Sensory Exam  Median Distribution: normal  Ulnar Distribution: normal  Radial Distribution: normal    Comments:  There is a well-healed carpal tunnel scar left wrist.  The suture lines intact.  There is no sign of infection.  There are no motor or sensory deficits.          Vascular Exam       Capillary Refill  Right Hand: normal capillary refill  Left Hand: normal capillary refill            Relevant imaging results reviewed and interpreted by me, discussed with the patient and / or family today radiographs left elbow hand and wrist were normal  Assessment:     Encounter Diagnoses   Name Primary?    S/P carpal tunnel release Yes    Carpal tunnel syndrome on both sides         Plan:     The patient the sutures removed left wrist today.  There is no sign of infection.  Steri-Strips were applied.  Wound care was discussed.  She will call when she is ready to schedule right carpal tunnel release.  Risk complications and alternatives were discussed including the risk of infection, anesthetic risk, injury to nerves and vessels, loss of motion, and possible need for additional surgeries were discussed.  She seems to understand and agree that surgery.  All questions were answered.                Disclaimer: This note was prepared using a voice recognition system and is likely to have sound alike errors within the text.

## 2022-08-30 ENCOUNTER — OFFICE VISIT (OUTPATIENT)
Dept: OBSTETRICS AND GYNECOLOGY | Facility: CLINIC | Age: 47
End: 2022-08-30
Payer: COMMERCIAL

## 2022-08-30 ENCOUNTER — HOSPITAL ENCOUNTER (OUTPATIENT)
Dept: RADIOLOGY | Facility: HOSPITAL | Age: 47
Discharge: HOME | End: 2022-08-30
Attending: OBSTETRICS & GYNECOLOGY
Payer: COMMERCIAL

## 2022-08-30 VITALS
SYSTOLIC BLOOD PRESSURE: 112 MMHG | HEIGHT: 65 IN | WEIGHT: 192.25 LBS | BODY MASS INDEX: 32.03 KG/M2 | DIASTOLIC BLOOD PRESSURE: 72 MMHG

## 2022-08-30 DIAGNOSIS — Z12.31 SCREENING MAMMOGRAM, ENCOUNTER FOR: ICD-10-CM

## 2022-08-30 DIAGNOSIS — Z12.31 BREAST CANCER SCREENING BY MAMMOGRAM: ICD-10-CM

## 2022-08-30 DIAGNOSIS — N95.1 MENOPAUSAL HOT FLUSHES: ICD-10-CM

## 2022-08-30 DIAGNOSIS — Z01.419 WELL WOMAN EXAM WITH ROUTINE GYNECOLOGICAL EXAM: Primary | ICD-10-CM

## 2022-08-30 PROCEDURE — 99386 PR PREVENTIVE VISIT,NEW,40-64: ICD-10-PCS | Mod: S$GLB,,, | Performed by: OBSTETRICS & GYNECOLOGY

## 2022-08-30 PROCEDURE — 77063 BREAST TOMOSYNTHESIS BI: CPT | Mod: 26,,, | Performed by: RADIOLOGY

## 2022-08-30 PROCEDURE — 77063 MAMMO DIGITAL SCREENING BILAT WITH TOMO: ICD-10-PCS | Mod: 26,,, | Performed by: RADIOLOGY

## 2022-08-30 PROCEDURE — 3008F BODY MASS INDEX DOCD: CPT | Mod: CPTII,S$GLB,, | Performed by: OBSTETRICS & GYNECOLOGY

## 2022-08-30 PROCEDURE — 77067 SCR MAMMO BI INCL CAD: CPT | Mod: 26,,, | Performed by: RADIOLOGY

## 2022-08-30 PROCEDURE — 3044F HG A1C LEVEL LT 7.0%: CPT | Mod: CPTII,S$GLB,, | Performed by: OBSTETRICS & GYNECOLOGY

## 2022-08-30 PROCEDURE — 3074F PR MOST RECENT SYSTOLIC BLOOD PRESSURE < 130 MM HG: ICD-10-PCS | Mod: CPTII,S$GLB,, | Performed by: OBSTETRICS & GYNECOLOGY

## 2022-08-30 PROCEDURE — 3044F PR MOST RECENT HEMOGLOBIN A1C LEVEL <7.0%: ICD-10-PCS | Mod: CPTII,S$GLB,, | Performed by: OBSTETRICS & GYNECOLOGY

## 2022-08-30 PROCEDURE — 99999 PR PBB SHADOW E&M-EST. PATIENT-LVL III: CPT | Mod: PBBFAC,,, | Performed by: OBSTETRICS & GYNECOLOGY

## 2022-08-30 PROCEDURE — 77063 BREAST TOMOSYNTHESIS BI: CPT | Mod: TC

## 2022-08-30 PROCEDURE — 3074F SYST BP LT 130 MM HG: CPT | Mod: CPTII,S$GLB,, | Performed by: OBSTETRICS & GYNECOLOGY

## 2022-08-30 PROCEDURE — 3078F DIAST BP <80 MM HG: CPT | Mod: CPTII,S$GLB,, | Performed by: OBSTETRICS & GYNECOLOGY

## 2022-08-30 PROCEDURE — 3078F PR MOST RECENT DIASTOLIC BLOOD PRESSURE < 80 MM HG: ICD-10-PCS | Mod: CPTII,S$GLB,, | Performed by: OBSTETRICS & GYNECOLOGY

## 2022-08-30 PROCEDURE — 77067 SCR MAMMO BI INCL CAD: CPT | Mod: TC

## 2022-08-30 PROCEDURE — 3008F PR BODY MASS INDEX (BMI) DOCUMENTED: ICD-10-PCS | Mod: CPTII,S$GLB,, | Performed by: OBSTETRICS & GYNECOLOGY

## 2022-08-30 PROCEDURE — 99999 PR PBB SHADOW E&M-EST. PATIENT-LVL III: ICD-10-PCS | Mod: PBBFAC,,, | Performed by: OBSTETRICS & GYNECOLOGY

## 2022-08-30 PROCEDURE — 99386 PREV VISIT NEW AGE 40-64: CPT | Mod: S$GLB,,, | Performed by: OBSTETRICS & GYNECOLOGY

## 2022-08-30 PROCEDURE — 1159F PR MEDICATION LIST DOCUMENTED IN MEDICAL RECORD: ICD-10-PCS | Mod: CPTII,S$GLB,, | Performed by: OBSTETRICS & GYNECOLOGY

## 2022-08-30 PROCEDURE — 1159F MED LIST DOCD IN RCRD: CPT | Mod: CPTII,S$GLB,, | Performed by: OBSTETRICS & GYNECOLOGY

## 2022-08-30 PROCEDURE — 77067 MAMMO DIGITAL SCREENING BILAT WITH TOMO: ICD-10-PCS | Mod: 26,,, | Performed by: RADIOLOGY

## 2022-08-30 RX ORDER — MELATON/B.COHOSH/VALERIAN/HOPS 3 MG-40 MG
1 CAPSULE ORAL DAILY
COMMUNITY
End: 2022-11-03

## 2022-08-30 NOTE — PROGRESS NOTES
Subjective:       Patient ID: Josh Simental is a 47 y.o. female.    Chief Complaint:  Well Woman      History of Present Illness  HPI  Presents for well-woman exam.  Pt was established with Dr. Breen at [facility].  Records reviewed in Epic.  A few years ago, pt was having irregular periods.  In office hysteroscopy with biopsy was benign.  Pt ultimately had her FSH checked, and it was 83.  Was having significant hot flushes, so started Climarapro patch (0.045/0.015 dose).  Had bleeding q 2-3 weeks on that.  Stopped it 1 month ago, and no bleeding since.  Using Estroven now instead, and still has hot flushes, but not nearly as bad as before.  Overall satisfied with Estroven, and wants to continue using that instead of HRT.  Last pap: 2020: neg  MMG scheduled  Cologuard : negative    GYN & OB History  Patient's last menstrual period was 2022 (exact date).   Date of Last Pap: No result found    OB History    Para Term  AB Living   2 2 2 0 0 2   SAB IAB Ectopic Multiple Live Births   0 0 0 0 2      # Outcome Date GA Lbr Camron/2nd Weight Sex Delivery Anes PTL Lv   2 Term      Vag-Spont   YANA   1 Term      Vag-Spont   YANA       Review of Systems  Review of Systems   Constitutional:  Negative for fatigue, fever and unexpected weight change.   Gastrointestinal:  Negative for abdominal pain, bloating, blood in stool, constipation, diarrhea, nausea and vomiting.   Endocrine: Positive for hot flashes.   Genitourinary:  Negative for decreased libido, dyspareunia, dysuria, flank pain, frequency, genital sores, hematuria, pelvic pain, urgency, vaginal bleeding, vaginal discharge, vaginal pain, urinary incontinence, postcoital bleeding, postmenopausal bleeding and vaginal odor.   Integumentary:  Negative for rash, hair changes, breast mass, nipple discharge and breast skin changes.   Psychiatric/Behavioral:  Negative for depression. The patient is not nervous/anxious.    Breast: Negative for mass,  mastodynia, nipple discharge and skin changes        Objective:    Physical Exam:   Constitutional: She is oriented to person, place, and time. She appears well-developed and well-nourished. No distress.             Abdominal: Soft. She exhibits no distension and no mass. There is no abdominal tenderness. There is no rebound and no guarding. Hernia confirmed negative in the right inguinal area and confirmed negative in the left inguinal area.     Genitourinary:    Vagina normal.      Pelvic exam was performed with patient supine.   There is no rash, tenderness, lesion or injury on the right labia. There is no rash, tenderness, lesion or injury on the left labia. Right adnexum displays no mass, no tenderness and no fullness. Left adnexum displays no mass, no tenderness and no fullness. No erythema,  no vaginal discharge, tenderness, bleeding, rectocele, cystocele or unspecified prolapse of vaginal walls in the vagina.    No foreign body in the vagina.      No signs of injury in the vagina.   Cervix exhibits no motion tenderness, no discharge and no friability. Uterus is not deviated, not enlarged, not fixed and not tender.               Neurological: She is alert and oriented to person, place, and time.     Psychiatric: She has a normal mood and affect.        Assessment:        1. Well woman exam with routine gynecological exam    2. Breast cancer screening by mammogram    3. Menopausal hot flushes                Plan:      Josh was seen today for well woman.    Diagnoses and all orders for this visit:    Well woman exam with routine gynecological exam    Breast cancer screening by mammogram    Menopausal hot flushes   Reviewed updated recommendations for pap smears (every 3 years) in low risk patients.   Recommend annual pelvic exams.  Reviewed recommendations for annual CBE and annual MMG.  Okay to continue estroven.  Advised her to let me know if bleeding resumes or if hot flushes worsen.  If hot flushes worsen  with no bleeding, then okay to resume HRT patch.  RTC 1 year.  Pap due in 2023.

## 2022-09-12 NOTE — PROGRESS NOTES
GEOFF sent 3x to Dr. Kanika Breen for mammo results.    Goal Outcome Evaluation:  Plan of Care Reviewed With: patient        Progress: no change    Pt remains intubated and sedated on vent, MD added PO meds, tube feeds on hold due to pt vomiting.  Vitals are wnl, will continue to monitor

## 2022-09-22 ENCOUNTER — PATIENT MESSAGE (OUTPATIENT)
Dept: OBSTETRICS AND GYNECOLOGY | Facility: CLINIC | Age: 47
End: 2022-09-22
Payer: COMMERCIAL

## 2022-09-22 DIAGNOSIS — N95.1 HOT FLASHES DUE TO MENOPAUSE: ICD-10-CM

## 2022-11-03 ENCOUNTER — OFFICE VISIT (OUTPATIENT)
Dept: INTERNAL MEDICINE | Facility: CLINIC | Age: 47
End: 2022-11-03
Payer: COMMERCIAL

## 2022-11-03 VITALS
BODY MASS INDEX: 30.37 KG/M2 | HEIGHT: 65 IN | OXYGEN SATURATION: 99 % | SYSTOLIC BLOOD PRESSURE: 118 MMHG | DIASTOLIC BLOOD PRESSURE: 80 MMHG | TEMPERATURE: 98 F | WEIGHT: 182.31 LBS | HEART RATE: 74 BPM

## 2022-11-03 DIAGNOSIS — J32.9 SINUSITIS, UNSPECIFIED CHRONICITY, UNSPECIFIED LOCATION: Primary | ICD-10-CM

## 2022-11-03 PROCEDURE — 99999 PR PBB SHADOW E&M-EST. PATIENT-LVL III: ICD-10-PCS | Mod: PBBFAC,,, | Performed by: FAMILY MEDICINE

## 2022-11-03 PROCEDURE — 99213 PR OFFICE/OUTPT VISIT, EST, LEVL III, 20-29 MIN: ICD-10-PCS | Mod: S$GLB,,, | Performed by: FAMILY MEDICINE

## 2022-11-03 PROCEDURE — 3079F DIAST BP 80-89 MM HG: CPT | Mod: CPTII,S$GLB,, | Performed by: FAMILY MEDICINE

## 2022-11-03 PROCEDURE — 3074F SYST BP LT 130 MM HG: CPT | Mod: CPTII,S$GLB,, | Performed by: FAMILY MEDICINE

## 2022-11-03 PROCEDURE — 3044F PR MOST RECENT HEMOGLOBIN A1C LEVEL <7.0%: ICD-10-PCS | Mod: CPTII,S$GLB,, | Performed by: FAMILY MEDICINE

## 2022-11-03 PROCEDURE — 3044F HG A1C LEVEL LT 7.0%: CPT | Mod: CPTII,S$GLB,, | Performed by: FAMILY MEDICINE

## 2022-11-03 PROCEDURE — 3008F BODY MASS INDEX DOCD: CPT | Mod: CPTII,S$GLB,, | Performed by: FAMILY MEDICINE

## 2022-11-03 PROCEDURE — 1159F MED LIST DOCD IN RCRD: CPT | Mod: CPTII,S$GLB,, | Performed by: FAMILY MEDICINE

## 2022-11-03 PROCEDURE — 3079F PR MOST RECENT DIASTOLIC BLOOD PRESSURE 80-89 MM HG: ICD-10-PCS | Mod: CPTII,S$GLB,, | Performed by: FAMILY MEDICINE

## 2022-11-03 PROCEDURE — 99213 OFFICE O/P EST LOW 20 MIN: CPT | Mod: S$GLB,,, | Performed by: FAMILY MEDICINE

## 2022-11-03 PROCEDURE — 1159F PR MEDICATION LIST DOCUMENTED IN MEDICAL RECORD: ICD-10-PCS | Mod: CPTII,S$GLB,, | Performed by: FAMILY MEDICINE

## 2022-11-03 PROCEDURE — 99999 PR PBB SHADOW E&M-EST. PATIENT-LVL III: CPT | Mod: PBBFAC,,, | Performed by: FAMILY MEDICINE

## 2022-11-03 PROCEDURE — 3008F PR BODY MASS INDEX (BMI) DOCUMENTED: ICD-10-PCS | Mod: CPTII,S$GLB,, | Performed by: FAMILY MEDICINE

## 2022-11-03 PROCEDURE — 3074F PR MOST RECENT SYSTOLIC BLOOD PRESSURE < 130 MM HG: ICD-10-PCS | Mod: CPTII,S$GLB,, | Performed by: FAMILY MEDICINE

## 2022-11-03 RX ORDER — AMOXICILLIN AND CLAVULANATE POTASSIUM 875; 125 MG/1; MG/1
1 TABLET, FILM COATED ORAL EVERY 12 HOURS
Qty: 20 TABLET | Refills: 0 | Status: SHIPPED | OUTPATIENT
Start: 2022-11-03 | End: 2023-03-23 | Stop reason: ALTCHOICE

## 2022-11-03 RX ORDER — PREDNISONE 20 MG/1
40 TABLET ORAL DAILY
Qty: 8 TABLET | Refills: 0 | Status: SHIPPED | OUTPATIENT
Start: 2022-11-03 | End: 2022-11-07

## 2022-11-04 NOTE — PROGRESS NOTES
Subjective:      Patient ID: Josh Simental is a 47 y.o. female.    Chief Complaint: Ear Fullness      Patient reports pain in sinuses, worse on left, radiating into left ear. Has had scratchy throat as well. Feels pressure shift when she leans over to  something    Ear Fullness   Associated symptoms include headaches, rhinorrhea and a sore throat. Pertinent negatives include no abdominal pain, coughing, rash or vomiting.   Review of Systems   Constitutional:  Positive for fatigue. Negative for activity change, appetite change and fever.   HENT:  Positive for ear pain, postnasal drip, rhinorrhea, sinus pressure and sore throat. Negative for congestion and sinus pain.    Respiratory:  Negative for cough, shortness of breath and wheezing.    Gastrointestinal:  Negative for abdominal pain, nausea and vomiting.   Musculoskeletal:  Negative for myalgias.   Skin:  Negative for rash.   Neurological:  Positive for headaches.   Past Medical History:   Diagnosis Date    Allergy     Anxiety     Depression     Endometriosis     Endometriosis 4/17/2019 10:14:15 AM    [employer] Historical - DO NOT USE: Endometriosis-No Additional Notes    Insulin resistance     Migraines     Other depressive disorder 4/17/2019 10:12:51 AM    [facility] [employer] Historical - [facility]: Depression-No Additional Notes    Pure hypercholesterolemia 6/30/2021 9:34:13 AM    [facility] [employer] Historical - Endocrine/Metabolic/Immune: Hypercholesterolemia-No Additional Notes    Sinusitis           Past Surgical History:   Procedure Laterality Date    CARPAL TUNNEL RELEASE Left 07/14/2022    Procedure: RELEASE, CARPAL TUNNEL;  Surgeon: Daryl Glover MD;  Location: [facility];  Service: Orthopedics;  Laterality: Left;    CHOLECYSTECTOMY  07/2017    EYE SURGERY      LASIK    GALLBLADDER SURGERY  07/2017    hysteroscope      LAPAROSCOPY  1994    dx 2/2 endometriosis    TUBAL LIGATION       Family History   Problem Relation Age of Onset    Stroke Paternal  Grandmother     Cancer Maternal Grandmother         Cervical     Diabetes Maternal Grandfather     Cancer Maternal Grandfather         Prostate    Diabetes Father     Cancer Father         Renal     Diabetes Mother     Thyroid disease Mother     Ovarian cancer Other     Colon cancer Other     Breast cancer Other      Social History     Socioeconomic History    Marital status:    Tobacco Use    Smoking status: Former     Packs/day: 0.25     Years: 2.00     Pack years: 0.50     Types: Cigarettes     Quit date: 1999     Years since quittin.4    Smokeless tobacco: Never    Tobacco comments:     Does not smoke   Substance and Sexual Activity    Alcohol use: Yes     Comment: rarely    Drug use: No    Sexual activity: Yes     Partners: Male     Birth control/protection: None     Social Determinants of Health     Financial Resource Strain: Low Risk     Difficulty of Paying Living Expenses: Not hard at all   Food Insecurity: No Food Insecurity    Worried About Running Out of Food in the Last Year: Never true    Ran Out of Food in the Last Year: Never true   Transportation Needs: No Transportation Needs    Lack of Transportation (Medical): No    Lack of Transportation (Non-Medical): No   Physical Activity: Insufficiently Active    Days of Exercise per Week: 4 days    Minutes of Exercise per Session: 20 min   Stress: No Stress Concern Present    Feeling of Stress : Only a little   Social Connections: Unknown    Frequency of Communication with Friends and Family: Three times a week    Frequency of Social Gatherings with Friends and Family: Three times a week    Active Member of Clubs or Organizations: Yes    Attends Club or Organization Meetings: More than 4 times per year    Marital Status:    Housing Stability: Low Risk     Unable to Pay for Housing in the Last Year: No    Number of Places Lived in the Last Year: 1    Unstable Housing in the Last Year: No     Review of patient's allergies indicates:  "  Allergen Reactions    No known drug allergies        Objective:       /80 (BP Location: Left arm, Patient Position: Sitting, BP Method: Medium (Manual))   Pulse 74   Temp 98.2 °F (36.8 °C) (Temporal)   Ht 5' 5" (1.651 m)   Wt 82.7 kg (182 lb 5.1 oz)   SpO2 99%   BMI 30.34 kg/m²   Physical Exam  Vitals reviewed.   Constitutional:       General: She is not in acute distress.     Appearance: She is well-developed. She is not diaphoretic.   HENT:      Head: Normocephalic.      Right Ear: Hearing, tympanic membrane, ear canal and external ear normal.      Left Ear: Hearing, tympanic membrane, ear canal and external ear normal.      Nose: Mucosal edema present.      Right Sinus: Maxillary sinus tenderness and frontal sinus tenderness present.      Left Sinus: Maxillary sinus tenderness and frontal sinus tenderness present.      Mouth/Throat:      Pharynx: Uvula midline. Posterior oropharyngeal erythema present.   Eyes:      Conjunctiva/sclera: Conjunctivae normal.      Pupils: Pupils are equal, round, and reactive to light.   Cardiovascular:      Rate and Rhythm: Normal rate and regular rhythm.      Heart sounds: Normal heart sounds.   Pulmonary:      Effort: Pulmonary effort is normal. No respiratory distress.      Breath sounds: Normal breath sounds.   Abdominal:      General: Bowel sounds are normal.      Palpations: Abdomen is soft.   Lymphadenopathy:      Cervical: No cervical adenopathy.   Skin:     General: Skin is warm and dry.      Capillary Refill: Capillary refill takes less than 2 seconds.   Psychiatric:         Behavior: Behavior normal.     Assessment:     1. Sinusitis, unspecified chronicity, unspecified location      Plan:   Sinusitis, unspecified chronicity, unspecified location    Other orders  -     amoxicillin-clavulanate 875-125mg (AUGMENTIN) 875-125 mg per tablet; Take 1 tablet by mouth every 12 (twelve) hours.  Dispense: 20 tablet; Refill: 0  -     predniSONE (DELTASONE) 20 MG tablet; " Take 2 tablets (40 mg total) by mouth once daily. for 4 days  Dispense: 8 tablet; Refill: 0      Medication List with Changes/Refills   New Medications    AMOXICILLIN-CLAVULANATE 875-125MG (AUGMENTIN) 875-125 MG PER TABLET    Take 1 tablet by mouth every 12 (twelve) hours.    PREDNISONE (DELTASONE) 20 MG TABLET    Take 2 tablets (40 mg total) by mouth once daily. for 4 days   Current Medications    BLOOD SUGAR DIAGNOSTIC STRP    To check BG 2 times daily, to use with insurance preferred meter    BLOOD-GLUCOSE METER KIT    To check BG 2 times daily, to use with insurance preferred meter    COLLAGEN MISC    by Misc.(Non-Drug; Combo Route) route.    ESTRADIOL-LEVONORGESTREL (CLIMARAPRO) 0.045-0.015 MG/24 HR    Place 1 patch onto the skin once a week.    LACTOBACILLUS ACIDOPHILUS (PROBIOTIC ORAL)    Take 1 tablet by mouth once daily.    LANCETS MISC    To check BG 2 times daily, to use with insurance preferred meter    METFORMIN (GLUCOPHAGE-XR) 500 MG ER 24HR TABLET    Take 1 tablet (500 mg total) by mouth daily with breakfast.   Discontinued Medications    ESOMEPRAZOLE (NEXIUM) 40 MG CAPSULE    Take 40 mg by mouth before breakfast.    SOY ISOFLA-BLK COHOSH-MAG BARK (ESTROVEN) 155 MG CAP    Take 1 capsule by mouth once daily at 6am.

## 2023-03-23 ENCOUNTER — OFFICE VISIT (OUTPATIENT)
Dept: GASTROENTEROLOGY | Facility: CLINIC | Age: 48
End: 2023-03-23
Payer: COMMERCIAL

## 2023-03-23 ENCOUNTER — PATIENT MESSAGE (OUTPATIENT)
Dept: INTERNAL MEDICINE | Facility: CLINIC | Age: 48
End: 2023-03-23
Payer: COMMERCIAL

## 2023-03-23 ENCOUNTER — PATIENT MESSAGE (OUTPATIENT)
Dept: GASTROENTEROLOGY | Facility: CLINIC | Age: 48
End: 2023-03-23

## 2023-03-23 VITALS — WEIGHT: 182 LBS | BODY MASS INDEX: 30.32 KG/M2 | HEIGHT: 65 IN

## 2023-03-23 DIAGNOSIS — K21.9 GASTROESOPHAGEAL REFLUX DISEASE, UNSPECIFIED WHETHER ESOPHAGITIS PRESENT: ICD-10-CM

## 2023-03-23 DIAGNOSIS — R09.89 CHOKING SENSATION: ICD-10-CM

## 2023-03-23 DIAGNOSIS — R13.10 DYSPHAGIA, UNSPECIFIED TYPE: Primary | ICD-10-CM

## 2023-03-23 PROCEDURE — 3008F BODY MASS INDEX DOCD: CPT | Mod: CPTII,95,, | Performed by: PHYSICIAN ASSISTANT

## 2023-03-23 PROCEDURE — 3008F PR BODY MASS INDEX (BMI) DOCUMENTED: ICD-10-PCS | Mod: CPTII,95,, | Performed by: PHYSICIAN ASSISTANT

## 2023-03-23 PROCEDURE — 1159F MED LIST DOCD IN RCRD: CPT | Mod: CPTII,95,, | Performed by: PHYSICIAN ASSISTANT

## 2023-03-23 PROCEDURE — 99203 OFFICE O/P NEW LOW 30 MIN: CPT | Mod: 95,,, | Performed by: PHYSICIAN ASSISTANT

## 2023-03-23 PROCEDURE — 1159F PR MEDICATION LIST DOCUMENTED IN MEDICAL RECORD: ICD-10-PCS | Mod: CPTII,95,, | Performed by: PHYSICIAN ASSISTANT

## 2023-03-23 PROCEDURE — 99203 PR OFFICE/OUTPT VISIT, NEW, LEVL III, 30-44 MIN: ICD-10-PCS | Mod: 95,,, | Performed by: PHYSICIAN ASSISTANT

## 2023-03-23 RX ORDER — SOY ISOFLAV/BCOHOSH/CISSUS QUA 198 MG
CAPSULE ORAL
Status: ON HOLD | COMMUNITY
Start: 2023-02-03 | End: 2023-06-16 | Stop reason: HOSPADM

## 2023-03-23 RX ORDER — PANTOPRAZOLE SODIUM 40 MG/1
40 TABLET, DELAYED RELEASE ORAL DAILY
Qty: 30 TABLET | Refills: 11 | Status: ON HOLD | OUTPATIENT
Start: 2023-03-23 | End: 2023-06-16 | Stop reason: HOSPADM

## 2023-03-23 NOTE — PROGRESS NOTES
Clinic Consult:  [facility] Consultation Note    Reason for Consult:  The primary encounter diagnosis was Dysphagia, unspecified type. Diagnoses of Choking sensation and Gastroesophageal reflux disease, unspecified whether esophagitis present were also pertinent to this visit.    PCP: Dianna Issa   No address on file    CC: Dysphagia (Choking during night has to cough to get relief)    The patient location is: [place]  The chief complaint leading to consultation is: dysphagia    Visit type: audiovisual    Face to Face time with patient: 12 mins  20 minutes of total time spent on the encounter, which includes face to face time and non-face to face time preparing to see the patient (eg, review of tests), Obtaining and/or reviewing separately obtained history, Documenting clinical information in the electronic or other health record, Independently interpreting results (not separately reported) and communicating results to the patient/family/caregiver, or Care coordination (not separately reported).     Each patient to whom he or she provides medical services by telemedicine is:  (1) informed of the relationship between the physician and patient and the respective role of any other health care provider with respect to management of the patient; and (2) notified that he or she may decline to receive medical services by telemedicine and may withdraw from such care at any time.    Notes:     HPI:  This is a 47 y.o. female here for evaluation of the above. Began with throat tightness, trouble swallowing about 1 year ago. Spoke with PCP - thought maybe reflux. Was put on Nexium for months, doubled it - still no improvement. Past few weeks, waking up in the middle of the night choking on liquid. Intermittent heartburn. Drinks a lot of coffee. Has never had an EGD. Feels the sensation near the base of the neck. Feels a soreness. Clearing her throat often. Dysphagia can be to solids or liquids.   Has not had  colonoscopy but did complete Cologuard with negative result in 2021.      Review of Systems   Constitutional:  Negative for activity change, appetite change, chills, diaphoresis, fatigue, fever and unexpected weight change.   HENT:  Positive for trouble swallowing. Negative for voice change.    Respiratory:  Positive for cough and choking. Negative for shortness of breath.    Cardiovascular:  Negative for chest pain.   Gastrointestinal:  Negative for abdominal pain, blood in stool, nausea and vomiting.   Skin:  Negative for color change and pallor.   Neurological:  Negative for dizziness and weakness.   Psychiatric/Behavioral:  Negative for dysphoric mood. The patient is not nervous/anxious.       Medical History:   Past Medical History:   Diagnosis Date    Allergy     Anxiety     Depression     Endometriosis     Endometriosis 4/17/2019 10:14:15 AM    [facility] [employer] Historical - DO NOT USE: Endometriosis-No Additional Notes    Insulin resistance     Migraines     Other depressive disorder 4/17/2019 10:12:51 AM    [facility] [employer] Historical - [facility]: Depression-No Additional Notes    Pure hypercholesterolemia 6/30/2021 9:34:13 AM    [facility] [employer] Historical - Endocrine/Metabolic/Immune: Hypercholesterolemia-No Additional Notes    Sinusitis        Surgical History:   Past Surgical History:   Procedure Laterality Date    CARPAL TUNNEL RELEASE Left 07/14/2022    Procedure: RELEASE, CARPAL TUNNEL;  Surgeon: Daryl Glover MD;  Location: [facility];  Service: Orthopedics;  Laterality: Left;    CHOLECYSTECTOMY  07/2017    EYE SURGERY      LASIK    GALLBLADDER SURGERY  07/2017    hysteroscope      LAPAROSCOPY  1994    dx 2/2 endometriosis    TUBAL LIGATION         Family History:    Family History   Problem Relation Age of Onset    Stroke Paternal Grandmother     Cancer Maternal Grandmother         Cervical     Diabetes Maternal Grandfather     Cancer Maternal Grandfather         Prostate    Diabetes Father     Cancer Father          Renal     Diabetes Mother     Thyroid disease Mother     Ovarian cancer Other     Colon cancer Other     Breast cancer Other        Social History:   Social History     Socioeconomic History    Marital status:    Tobacco Use    Smoking status: Former     Packs/day: 0.25     Years: 2.00     Pack years: 0.50     Types: Cigarettes     Quit date: 1999     Years since quittin.7    Smokeless tobacco: Never    Tobacco comments:     Does not smoke   Substance and Sexual Activity    Alcohol use: Yes     Comment: rarely    Drug use: No    Sexual activity: Yes     Partners: Male     Birth control/protection: None     Social Determinants of Health     Financial Resource Strain: Low Risk     Difficulty of Paying Living Expenses: Not hard at all   Food Insecurity: No Food Insecurity    Worried About Running Out of Food in the Last Year: Never true    Ran Out of Food in the Last Year: Never true   Transportation Needs: No Transportation Needs    Lack of Transportation (Medical): No    Lack of Transportation (Non-Medical): No   Physical Activity: Insufficiently Active    Days of Exercise per Week: 4 days    Minutes of Exercise per Session: 20 min   Stress: No Stress Concern Present    Feeling of Stress : Only a little   Social Connections: Unknown    Frequency of Communication with Friends and Family: Three times a week    Frequency of Social Gatherings with Friends and Family: Three times a week    Active Member of Clubs or Organizations: Yes    Attends Club or Organization Meetings: More than 4 times per year    Marital Status:    Housing Stability: Low Risk     Unable to Pay for Housing in the Last Year: No    Number of Places Lived in the Last Year: 1    Unstable Housing in the Last Year: No       Allergies:   Review of patient's allergies indicates:   Allergen Reactions    No known drug allergies        Home Medications:   Current Outpatient Medications on File Prior to Visit   Medication Sig  "Dispense Refill    LACTOBACILLUS ACIDOPHILUS (PROBIOTIC ORAL) Take 1 tablet by mouth once daily.      metFORMIN (GLUCOPHAGE-XR) 500 MG ER 24hr tablet Take 1 tablet (500 mg total) by mouth daily with breakfast. 90 tablet 3    rhubarb root extract (ESTROVEN CMPLT MENOPAUSE RLF) 4 mg Tab       blood sugar diagnostic Strp To check BG 2 times daily, to use with insurance preferred meter 180 strip 0    blood-glucose meter kit To check BG 2 times daily, to use with insurance preferred meter 1 each 0    lancets Misc To check BG 2 times daily, to use with insurance preferred meter 180 each 0    [DISCONTINUED] amoxicillin-clavulanate 875-125mg (AUGMENTIN) 875-125 mg per tablet Take 1 tablet by mouth every 12 (twelve) hours. 20 tablet 0    [DISCONTINUED] COLLAGEN MISC by Misc.(Non-Drug; Combo Route) route.      [DISCONTINUED] estradioL-levonorgestreL (CLIMARAPRO) 0.045-0.015 mg/24 hr Place 1 patch onto the skin once a week. 4 patch 11     Current Facility-Administered Medications on File Prior to Visit   Medication Dose Route Frequency Provider Last Rate Last Admin    chlorhexidine 0.12 % solution 10 mL  10 mL Mouth/Throat On Call Procedure Rose Santo PA-C   10 mL at 07/14/22 0655       Physical Exam:  Ht 5' 5" (1.651 m)   Wt 82.6 kg (182 lb) Comment: stated wt/am  BMI 30.29 kg/m²   Body mass index is 30.29 kg/m².  Physical Exam  Constitutional:       General: She is not in acute distress.     Appearance: Normal appearance. She is not ill-appearing, toxic-appearing or diaphoretic.   HENT:      Head: Normocephalic and atraumatic.   Neurological:      Mental Status: She is alert.         Labs: Pertinent labs reviewed.  Endoscopy: --  CRC Screening: Cologuard  Anticoagulation: --    Assessment:  1. Dysphagia, unspecified type    2. Choking sensation    3. Gastroesophageal reflux disease, unspecified whether esophagitis present         Recommendations:  -Recommend EGD for ongoing and progressive dysphagia, choking, " GERD  -Begin Protonix 40mg daily in the interim.   -Dysphagia precautions discussed.  -GERD diet  -ED with worsening symptoms.     Dysphagia, unspecified type  -     Case Request Endoscopy: EGD (ESOPHAGOGASTRODUODENOSCOPY)  -     Ambulatory referral/consult to Endo Procedure ; Future; Expected date: 03/24/2023  -     pantoprazole (PROTONIX) 40 MG tablet; Take 1 tablet (40 mg total) by mouth once daily.  Dispense: 30 tablet; Refill: 11    Choking sensation  -     Case Request Endoscopy: EGD (ESOPHAGOGASTRODUODENOSCOPY)  -     Ambulatory referral/consult to Endo Procedure ; Future; Expected date: 03/24/2023  -     pantoprazole (PROTONIX) 40 MG tablet; Take 1 tablet (40 mg total) by mouth once daily.  Dispense: 30 tablet; Refill: 11    Gastroesophageal reflux disease, unspecified whether esophagitis present  -     Case Request Endoscopy: EGD (ESOPHAGOGASTRODUODENOSCOPY)  -     Ambulatory referral/consult to Endo Procedure ; Future; Expected date: 03/24/2023  -     pantoprazole (PROTONIX) 40 MG tablet; Take 1 tablet (40 mg total) by mouth once daily.  Dispense: 30 tablet; Refill: 11        No follow-ups on file.    Thank you so much for allowing me to participate in the care of Josh Kwok PA-C

## 2023-03-31 ENCOUNTER — HOSPITAL ENCOUNTER (OUTPATIENT)
Dept: PREADMISSION TESTING | Facility: HOSPITAL | Age: 48
Discharge: HOME | End: 2023-03-31
Attending: COLON & RECTAL SURGERY
Payer: COMMERCIAL

## 2023-03-31 DIAGNOSIS — R13.10 DYSPHAGIA, UNSPECIFIED TYPE: ICD-10-CM

## 2023-03-31 DIAGNOSIS — R09.89 CHOKING SENSATION: ICD-10-CM

## 2023-03-31 DIAGNOSIS — K21.9 GASTROESOPHAGEAL REFLUX DISEASE, UNSPECIFIED WHETHER ESOPHAGITIS PRESENT: ICD-10-CM

## 2023-04-10 ENCOUNTER — HOSPITAL ENCOUNTER (OUTPATIENT)
Dept: PREADMISSION TESTING | Facility: HOSPITAL | Age: 48
Discharge: HOME | End: 2023-04-10
Attending: INTERNAL MEDICINE
Payer: COMMERCIAL

## 2023-04-12 ENCOUNTER — TELEPHONE (OUTPATIENT)
Dept: PREADMISSION TESTING | Facility: HOSPITAL | Age: 48
End: 2023-04-12
Payer: COMMERCIAL

## 2023-04-12 ENCOUNTER — PATIENT MESSAGE (OUTPATIENT)
Dept: GASTROENTEROLOGY | Facility: CLINIC | Age: 48
End: 2023-04-12
Payer: COMMERCIAL

## 2023-04-13 ENCOUNTER — TELEPHONE (OUTPATIENT)
Dept: PREADMISSION TESTING | Facility: HOSPITAL | Age: 48
End: 2023-04-13
Payer: COMMERCIAL

## 2023-04-26 ENCOUNTER — OFFICE VISIT (OUTPATIENT)
Dept: DERMATOLOGY | Facility: CLINIC | Age: 48
End: 2023-04-26
Payer: COMMERCIAL

## 2023-04-26 DIAGNOSIS — L72.3 INFLAMED EPIDERMOID CYST OF SKIN: ICD-10-CM

## 2023-04-26 DIAGNOSIS — D18.00 HEMANGIOMA, UNSPECIFIED SITE: Primary | ICD-10-CM

## 2023-04-26 PROCEDURE — 11102 PR TANGENTIAL BIOPSY, SKIN, SINGLE LESION: ICD-10-PCS | Mod: XS,S$GLB,, | Performed by: STUDENT IN AN ORGANIZED HEALTH CARE EDUCATION/TRAINING PROGRAM

## 2023-04-26 PROCEDURE — 11102 TANGNTL BX SKIN SINGLE LES: CPT | Mod: XS,S$GLB,, | Performed by: STUDENT IN AN ORGANIZED HEALTH CARE EDUCATION/TRAINING PROGRAM

## 2023-04-26 PROCEDURE — 1159F PR MEDICATION LIST DOCUMENTED IN MEDICAL RECORD: ICD-10-PCS | Mod: CPTII,S$GLB,, | Performed by: STUDENT IN AN ORGANIZED HEALTH CARE EDUCATION/TRAINING PROGRAM

## 2023-04-26 PROCEDURE — 88305 TISSUE EXAM BY PATHOLOGIST: CPT | Mod: 26,,, | Performed by: PATHOLOGY

## 2023-04-26 PROCEDURE — 11401 PR EXC SKIN BENIG 0.6-1 CM TRUNK,ARM,LEG: ICD-10-PCS | Mod: S$GLB,,, | Performed by: STUDENT IN AN ORGANIZED HEALTH CARE EDUCATION/TRAINING PROGRAM

## 2023-04-26 PROCEDURE — 99499 NO LOS: ICD-10-PCS | Mod: S$GLB,,, | Performed by: STUDENT IN AN ORGANIZED HEALTH CARE EDUCATION/TRAINING PROGRAM

## 2023-04-26 PROCEDURE — 1159F MED LIST DOCD IN RCRD: CPT | Mod: CPTII,S$GLB,, | Performed by: STUDENT IN AN ORGANIZED HEALTH CARE EDUCATION/TRAINING PROGRAM

## 2023-04-26 PROCEDURE — 88305 TISSUE EXAM BY PATHOLOGIST: CPT | Performed by: PATHOLOGY

## 2023-04-26 PROCEDURE — 99499 UNLISTED E&M SERVICE: CPT | Mod: S$GLB,,, | Performed by: STUDENT IN AN ORGANIZED HEALTH CARE EDUCATION/TRAINING PROGRAM

## 2023-04-26 PROCEDURE — 11401 EXC TR-EXT B9+MARG 0.6-1 CM: CPT | Mod: S$GLB,,, | Performed by: STUDENT IN AN ORGANIZED HEALTH CARE EDUCATION/TRAINING PROGRAM

## 2023-04-26 PROCEDURE — 99999 PR PBB SHADOW E&M-EST. PATIENT-LVL III: CPT | Mod: PBBFAC,,, | Performed by: STUDENT IN AN ORGANIZED HEALTH CARE EDUCATION/TRAINING PROGRAM

## 2023-04-26 PROCEDURE — 1160F RVW MEDS BY RX/DR IN RCRD: CPT | Mod: CPTII,S$GLB,, | Performed by: STUDENT IN AN ORGANIZED HEALTH CARE EDUCATION/TRAINING PROGRAM

## 2023-04-26 PROCEDURE — 88305 TISSUE EXAM BY PATHOLOGIST: ICD-10-PCS | Mod: 26,,, | Performed by: PATHOLOGY

## 2023-04-26 PROCEDURE — 99999 PR PBB SHADOW E&M-EST. PATIENT-LVL III: ICD-10-PCS | Mod: PBBFAC,,, | Performed by: STUDENT IN AN ORGANIZED HEALTH CARE EDUCATION/TRAINING PROGRAM

## 2023-04-26 PROCEDURE — 1160F PR REVIEW ALL MEDS BY PRESCRIBER/CLIN PHARMACIST DOCUMENTED: ICD-10-PCS | Mod: CPTII,S$GLB,, | Performed by: STUDENT IN AN ORGANIZED HEALTH CARE EDUCATION/TRAINING PROGRAM

## 2023-04-26 NOTE — PATIENT INSTRUCTIONS

## 2023-04-26 NOTE — PROGRESS NOTES
Subjective:      Patient ID:  Josh Simental is a 48 y.o. female who presents for skin lesions  History of Present Illness: The patient presents with chief complaint of mole.  Location: right neck, left forearm  Duration: greater 20 years  Signs/Symptoms: bleeds at times, itches    Prior treatments: na     Review of Systems   Skin:  Negative for itching and rash.     Objective:   Physical Exam   Constitutional: She appears well-developed and well-nourished. No distress.   Neurological: She is alert and oriented to person, place, and time. She is not disoriented.   Psychiatric: She has a normal mood and affect.   Skin:   Areas Examined (abnormalities noted in diagram):   Head / Face Inspection Performed  Neck Inspection Performed  LUE Inspection Performed          Diagram Legend     Erythematous scaling macule/papule c/w actinic keratosis       Vascular papule c/w angioma      Pigmented verrucoid papule/plaque c/w seborrheic keratosis      Yellow umbilicated papule c/w sebaceous hyperplasia      Irregularly shaped tan macule c/w lentigo     1-2 mm smooth white papules consistent with Milia      Movable subcutaneous cyst with punctum c/w epidermal inclusion cyst      Subcutaneous movable cyst c/w pilar cyst      Firm pink to brown papule c/w dermatofibroma      Pedunculated fleshy papule(s) c/w skin tag(s)      Evenly pigmented macule c/w junctional nevus     Mildly variegated pigmented, slightly irregular-bordered macule c/w mildly atypical nevus      Flesh colored to evenly pigmented papule c/w intradermal nevus       Pink pearly papule/plaque c/w basal cell carcinoma      Erythematous hyperkeratotic cursted plaque c/w SCC      Surgical scar with no sign of skin cancer recurrence      Open and closed comedones      Inflammatory papules and pustules      Verrucoid papule consistent consistent with wart     Erythematous eczematous patches and plaques     Dystrophic onycholytic nail with subungual debris c/w  onychomycosis     Umbilicated papule    Erythematous-base heme-crusted tan verrucoid plaque consistent with inflamed seborrheic keratosis     Erythematous Silvery Scaling Plaque c/w Psoriasis     See annotation      Assessment / Plan:      Pathology Orders:       Normal Orders This Visit    Specimen to Pathology, Dermatology     Questions:    Procedure Type: Dermatology and skin neoplasms    Number of Specimens: 1    ------------------------: -------------------------    Spec 1 Procedure: Biopsy    Spec 1 Clinical Impression: EIC vs other    Spec 1 Source: left forearm    Release to patient: Immediate          Hemangioma, unspecified site  Shave removal procedure note:    Shave removal performed after verbal consent including risk of infection, scar, recurrence, need for additional treatment of site. Area prepped with alcohol, anesthetized with approximately 1.0cc of 1% lidocaine with epinephrine. Lesional tissue shaved with dermablade. Hemostasis achieved with hyfrecation. No complications. Dressing applied. Wound care explained.    Inflamed epidermoid cyst of skin  -     Specimen to Pathology, Dermatology  Punch removal procedure note:  Punch removal performed after verbal consent obtained. Area marked and prepped with alcohol. Approximately 1cc of 1% lidocaine with epinephrine injected. 8 mm disposable punch used to remove lesion. Hemostasis obtained and biopsy site closed with 1 - 2 ethilon sutures. Wound care instructions reviewed with patient and handout given.             LOS NUMBER AND COMPLEXITY OF PROBLEMS    COMPLEXITY OF DATA RISK TOTAL TIME (m)   58894  71188 [] 1 self-limited or minor problem [] Minimal to none [] No treatment recommended or patient to monitor 15-29  10-19   65624  60193 Low  [] 2 or > self limited or minor problems  [] 1 stable chronic illness  [] 1 acute, uncomplicated illness or injury Limited (2)  [] Prior external notes from each unique source  [] Review result of each unique  test  [] Order each unique test []  Low  OTC medications, minor skin biopsy 30-44  20-29   33019  22921 Moderate  []  1 or > chronic illness with progression, exacerbation or SE of treatment  []  2 or more stable chronic illnesses  []  1 acute illness with systemic symptoms  []  1 acute complicated injury  []  1 undiagnosed new problem with uncertain prognosis Moderate (1/3 below)  []  3 or more data items        *Now includes assessment requiring independent historian  []  Independent interpretation of a test  []  Discuss management/test with another provider Moderate  []  Prescription drug mgmt  []  Minor surgery with risk discussed  []  Mgmt limited by social determinates 45-59  30-39   95641  12485 High  []  1 or more chronic illness with severe exacerbation, progression or SE of treatment  []  1 acute or chronic illness/injury that poses a threat to life or bodily function Extensive (2/3 below)  []  3 or more data items        *Now includes assessment requiring independent historian.  []  Independent interpretation of a test  []  Discuss management/test with another provider High  []  Major surgery with risk discussed  []  Drug therapy requiring intensive monitoring for toxicity  []  Hospitalization  []  Decision for DNR 60-74  40-54         No follow-ups on file.

## 2023-05-08 LAB
FINAL PATHOLOGIC DIAGNOSIS: NORMAL
GROSS: NORMAL
Lab: NORMAL
MICROSCOPIC EXAM: NORMAL

## 2023-06-01 ENCOUNTER — PATIENT MESSAGE (OUTPATIENT)
Dept: GASTROENTEROLOGY | Facility: CLINIC | Age: 48
End: 2023-06-01
Payer: COMMERCIAL

## 2023-06-02 ENCOUNTER — TELEPHONE (OUTPATIENT)
Dept: PREADMISSION TESTING | Facility: HOSPITAL | Age: 48
End: 2023-06-02
Payer: COMMERCIAL

## 2023-06-02 NOTE — TELEPHONE ENCOUNTER
----- Message from Doris Gordon sent at 6/2/2023  2:11 PM CDT -----  Regarding: returned call  Contact: patient  Patient is returning a call regarding rescheduling her colonoscopy, please call them back at [phone]

## 2023-06-16 ENCOUNTER — ANESTHESIA (OUTPATIENT)
Dept: ENDOSCOPY | Facility: HOSPITAL | Age: 48
End: 2023-06-16
Payer: COMMERCIAL

## 2023-06-16 ENCOUNTER — HOSPITAL ENCOUNTER (OUTPATIENT)
Facility: HOSPITAL | Age: 48
Discharge: HOME | End: 2023-06-16
Attending: INTERNAL MEDICINE | Admitting: INTERNAL MEDICINE
Payer: COMMERCIAL

## 2023-06-16 ENCOUNTER — ANESTHESIA EVENT (OUTPATIENT)
Dept: ENDOSCOPY | Facility: HOSPITAL | Age: 48
End: 2023-06-16
Payer: COMMERCIAL

## 2023-06-16 DIAGNOSIS — R09.A2 GLOBUS SENSATION: ICD-10-CM

## 2023-06-16 PROCEDURE — 88305 TISSUE EXAM BY PATHOLOGIST: ICD-10-PCS | Mod: 26,,, | Performed by: STUDENT IN AN ORGANIZED HEALTH CARE EDUCATION/TRAINING PROGRAM

## 2023-06-16 PROCEDURE — 43239 PR EGD, FLEX, W/BIOPSY, SGL/MULTI: ICD-10-PCS | Mod: ,,, | Performed by: INTERNAL MEDICINE

## 2023-06-16 PROCEDURE — 37000008 HC ANESTHESIA 1ST 15 MINUTES: Performed by: INTERNAL MEDICINE

## 2023-06-16 PROCEDURE — 43239 EGD BIOPSY SINGLE/MULTIPLE: CPT | Mod: ,,, | Performed by: INTERNAL MEDICINE

## 2023-06-16 PROCEDURE — 88342 IMHCHEM/IMCYTCHM 1ST ANTB: CPT | Performed by: STUDENT IN AN ORGANIZED HEALTH CARE EDUCATION/TRAINING PROGRAM

## 2023-06-16 PROCEDURE — 63600175 PHARM REV CODE 636 W HCPCS: Performed by: FAMILY MEDICINE

## 2023-06-16 PROCEDURE — 88342 IMHCHEM/IMCYTCHM 1ST ANTB: CPT | Mod: 26,,, | Performed by: STUDENT IN AN ORGANIZED HEALTH CARE EDUCATION/TRAINING PROGRAM

## 2023-06-16 PROCEDURE — 25000003 PHARM REV CODE 250: Performed by: FAMILY MEDICINE

## 2023-06-16 PROCEDURE — 37000009 HC ANESTHESIA EA ADD 15 MINS: Performed by: INTERNAL MEDICINE

## 2023-06-16 PROCEDURE — 43239 EGD BIOPSY SINGLE/MULTIPLE: CPT | Performed by: INTERNAL MEDICINE

## 2023-06-16 PROCEDURE — 88342 CHG IMMUNOCYTOCHEMISTRY: ICD-10-PCS | Mod: 26,,, | Performed by: STUDENT IN AN ORGANIZED HEALTH CARE EDUCATION/TRAINING PROGRAM

## 2023-06-16 PROCEDURE — 88305 TISSUE EXAM BY PATHOLOGIST: CPT | Performed by: STUDENT IN AN ORGANIZED HEALTH CARE EDUCATION/TRAINING PROGRAM

## 2023-06-16 PROCEDURE — 88305 TISSUE EXAM BY PATHOLOGIST: CPT | Mod: 26,,, | Performed by: STUDENT IN AN ORGANIZED HEALTH CARE EDUCATION/TRAINING PROGRAM

## 2023-06-16 PROCEDURE — 27201012 HC FORCEPS, HOT/COLD, DISP: Performed by: INTERNAL MEDICINE

## 2023-06-16 RX ORDER — LANOLIN ALCOHOL/MO/W.PET/CERES
1 CREAM (GRAM) TOPICAL 2 TIMES DAILY
COMMUNITY

## 2023-06-16 RX ORDER — LIDOCAINE HYDROCHLORIDE 20 MG/ML
INJECTION, SOLUTION EPIDURAL; INFILTRATION; INTRACAUDAL; PERINEURAL
Status: DISCONTINUED | OUTPATIENT
Start: 2023-06-16 | End: 2023-06-16

## 2023-06-16 RX ORDER — BIOTIN 1 MG
1000 TABLET ORAL 3 TIMES DAILY
COMMUNITY

## 2023-06-16 RX ORDER — PROPOFOL 10 MG/ML
VIAL (ML) INTRAVENOUS
Status: DISCONTINUED | OUTPATIENT
Start: 2023-06-16 | End: 2023-06-16

## 2023-06-16 RX ADMIN — LIDOCAINE HYDROCHLORIDE 50 MG: 20 INJECTION, SOLUTION EPIDURAL; INFILTRATION; INTRACAUDAL; PERINEURAL at 09:06

## 2023-06-16 RX ADMIN — SODIUM CHLORIDE, SODIUM LACTATE, POTASSIUM CHLORIDE, AND CALCIUM CHLORIDE: .6; .31; .03; .02 INJECTION, SOLUTION INTRAVENOUS at 09:06

## 2023-06-16 RX ADMIN — PROPOFOL 50 MG: 10 INJECTION, EMULSION INTRAVENOUS at 09:06

## 2023-06-16 RX ADMIN — PROPOFOL 150 MG: 10 INJECTION, EMULSION INTRAVENOUS at 09:06

## 2023-06-16 NOTE — ANESTHESIA POSTPROCEDURE EVALUATION
Anesthesia Post Evaluation    Patient: Josh Simental    Procedure(s) Performed: Procedure(s) (LRB):  EGD (ESOPHAGOGASTRODUODENOSCOPY) (N/A)    Final Anesthesia Type: MAC      Patient location during evaluation: GI PACU  Patient participation: Yes- Able to Participate  Level of consciousness: awake and alert  Post-procedure vital signs: reviewed and stable  Pain management: adequate  Airway patency: patent    PONV status at discharge: No PONV  Anesthetic complications: no      Cardiovascular status: stable  Respiratory status: unassisted and spontaneous ventilation  Hydration status: euvolemic  Follow-up not needed.          Vitals Value Taken Time   BP 99/59 06/16/23 0947   Temp 36.7 °C (98 °F) 06/16/23 0947   Pulse 67 06/16/23 0947   Resp 20 06/16/23 0947   SpO2 94 % 06/16/23 0947         No case tracking events are documented in the log.      Pain/Juan Score: Juan Score: 9 (6/16/2023  9:47 AM)

## 2023-06-16 NOTE — TRANSFER OF CARE
"Anesthesia Transfer of Care Note    Patient: Josh Simental    Procedure(s) Performed: Procedure(s) (LRB):  EGD (ESOPHAGOGASTRODUODENOSCOPY) (N/A)    Patient location: GI    Anesthesia Type: MAC    Transport from OR: Transported from OR on room air with adequate spontaneous ventilation    Post pain: adequate analgesia    Post assessment: no apparent anesthetic complications    Post vital signs: stable    Level of consciousness: awake and responds to stimulation    Nausea/Vomiting: no nausea/vomiting    Complications: none    Transfer of care protocol was followed      Last vitals:   Visit Vitals  BP (!) 99/59 (BP Location: Left arm, Patient Position: Lying)   Pulse 67   Temp 36.7 °C (98 °F) (Skin)   Resp 20   Ht 5' 4" (1.626 m)   Wt 85.3 kg (188 lb)   SpO2 (!) 94%   Breastfeeding No   BMI 32.27 kg/m²     "

## 2023-06-16 NOTE — ANESTHESIA PREPROCEDURE EVALUATION
06/16/2023  Johs Simental is a 48 y.o., female.      Pre-op Assessment    I have reviewed the Patient Summary Reports.     I have reviewed the Nursing Notes. I have reviewed the NPO Status.   I have reviewed the Medications.     Review of Systems  Anesthesia Hx:  No problems with previous Anesthesia  Denies Family Hx of Anesthesia complications.   Denies Personal Hx of Anesthesia complications.   Social:  Former Smoker    Hematology/Oncology:  Hematology Normal        Cardiovascular:   Exercise tolerance: good Denies Hypertension.  Denies Dysrhythmias.  hyperlipidemia    Pulmonary:  Pulmonary Normal    Renal/:  Renal/ Normal     Hepatic/GI:  Hepatic/GI Normal    Musculoskeletal:   Carpal tunnel   Neurological:   Headaches    Endocrine:   Prediabetes. Obesity / BMI > 30  Psych:   Psychiatric History depression          Physical Exam  General: Well nourished, Cooperative, Alert and Oriented    Airway:  Mallampati: II   Mouth Opening: Normal  TM Distance: Normal  Tongue: Normal  Neck ROM: Normal ROM    Dental:  Intact    Chest/Lungs:  Clear to auscultation    Heart:  Rhythm: Regular Rhythm  Sounds: Normal    Abdomen:  Normal        Anesthesia Plan  Type of Anesthesia, risks & benefits discussed:    Anesthesia Type: MAC  Intra-op Monitoring Plan: Standard ASA Monitors  Induction:  IV  Informed Consent: Informed consent signed with the Patient and all parties understand the risks and agree with anesthesia plan.  All questions answered.   ASA Score: 2  Day of Surgery Review of History & Physical: I have interviewed and examined the patient. I have reviewed the patient's H&P dated:     Ready For Surgery From Anesthesia Perspective.     .

## 2023-06-16 NOTE — PLAN OF CARE
DR SALGADO AT BEDSIDE TO SPEAK TO PT. REGARDING RESULTS.  VSS, NO GI BLEEDING, NO ABD. PAIN, NO N/V. PT. DISCHARGED FROM UNIT.

## 2023-06-16 NOTE — [employer] PATIENT INSTRUCTIONS
Discharge Summary/Instructions after an Endoscopic Procedure  Patient Name: Josh Simental  Patient MRN: [id number]  Patient YOB: 1975  Friday, June 16, 2023 Eileen Yo MD  Dear patient,  As a result of recent federal legislation (The Federal Cures Act), you may   receive lab or pathology results from your procedure in your [facility]   account before your physician is able to contact you. Your physician or   their representative will relay the results to you with their   recommendations at their soonest availability.  Thank you,  RESTRICTIONS:  During your procedure today, you received medications for sedation.  These   medications may affect your judgment, balance and coordination.  Therefore,   for 24 hours, you have the following restrictions:   - DO NOT drive a car, operate machinery, make legal/financial decisions,   sign important papers or drink alcohol.    ACTIVITY:  Today: no heavy lifting, straining or running due to procedural   sedation/anesthesia.  The following day: return to full activity including work.  DIET:  Eat and drink normally unless instructed otherwise.     TREATMENT FOR COMMON SIDE EFFECTS:  - Mild abdominal pain, nausea, belching, bloating or excessive gas:  rest,   eat lightly and use a heating pad.  - Sore Throat: treat with throat lozenges and/or gargle with warm salt   water.  - Because air was used during the procedure, expelling large amounts of air   from your rectum or belching is normal.  - If a bowel prep was taken, you may not have a bowel movement for 1-3 days.    This is normal.  SYMPTOMS TO WATCH FOR AND REPORT TO YOUR PHYSICIAN:  1. Abdominal pain or bloating, other than gas cramps.  2. Chest pain.  3. Back pain.  4. Signs of infection such as: chills or fever occurring within 24 hours   after the procedure.  5. Rectal bleeding, which would show as bright red, maroon, or black stools.   (A tablespoon of blood from the rectum is not serious, especially if    hemorrhoids are present.)  6. Vomiting.  7. Weakness or dizziness.  GO DIRECTLY TO THE NEAREST EMERGENCY ROOM IF YOU HAVE ANY OF THE FOLLOWING:      Difficulty breathing              Chills and/or fever over 101 F   Persistent vomiting and/or vomiting blood   Severe abdominal pain   Severe chest pain   Black, tarry stools   Bleeding- more than one tablespoon   Any other symptom or condition that you feel may need urgent attention  Your doctor recommends these additional instructions:  If any biopsies were taken, your doctors clinic will contact you in 1 to 2   weeks with any results.  - Discharge patient to home (with escort).   - Resume previous diet.   - Continue present medications.   - Await pathology results.   - US neck to evaluate globus sensation  - Return to GI clinic with Ms. Fatmata Kwok PA-C.   - Stop Protonix  For questions, problems or results please call your physician Eileen Yo MD at Work:  [phone]  If you have any questions about the above instructions, call the GI   department at [phone] or call the endoscopy unit at [phone]   from 7am until 3 pm.  [facility] [place], EMERGENCY ROOM PHONE NUMBER:   [phone]  IF A COMPLICATION OR EMERGENCY SITUATION ARISES AND YOU ARE UNABLE TO REACH   YOUR PHYSICIAN - GO DIRECTLY TO THE EMERGENCY ROOM.  I have read or have had read to me these discharge instructions for my   procedure and have received a written copy.  I understand these   instructions and will follow-up with my physician if I have any questions.     __________________________________       _____________________________________  Nurse Signature                                          Patient/Designated   Responsible Party Signature  MD Eileen Joseph MD  6/16/2023 9:51:12 AM  This report has been verified and signed electronically.  Dear patient,  As a result of recent federal legislation (The Federal Cures Act), you may    receive lab or pathology results from your procedure in your [facility]   account before your physician is able to contact you. Your physician or   their representative will relay the results to you with their   recommendations at their soonest availability.  Thank you,  [employer]ATION

## 2023-06-16 NOTE — H&P
PRE PROCEDURE H&P    Patient Name: Josh Simental  MRN: [id number]  : 1975  Date of Procedure:  2023  Referring Physician: Fatmata Kwok PA-C  Primary Physician: Dianna Issa MD  Procedure Physician: Eileen Yo MD       Planned Procedure: EGD  Diagnosis:   globus sensation    Chief Complaint: Same as above    HPI: Patient is an 48 y.o. female is here for the above. Saw Ms. Avilez Sundar SANTIAGO via televisit in 2023 for complaints initially reported as dysphagia however patient clarifies today there is no dysphagia. She can tolerate solids and liquids without difficulty. She has a globus sensation and feels as if there is something in her throat. Denies weight loss or tobacco use. Rare etoh use. No previous EGD. No previous imaging regarding these symptoms. They have been present for approximately 1 year.  at bedside.    Last colonoscopy: none. Cologuard   Family history: none  Anticoagulation: none    Past Medical History:   Past Medical History:   Diagnosis Date    Allergy     Anxiety     Depression     Endometriosis     Endometriosis 2019 10:14:15 AM    [facility] Historical - DO NOT USE: Endometriosis-No Additional Notes    Insulin resistance     Migraines     Other depressive disorder 2019 10:12:51 AM    [facility] Historical - [facility]: Depression-No Additional Notes    Pure hypercholesterolemia 2021 9:34:13 AM    [facility] Historical - Endocrine/Metabolic/Immune: Hypercholesterolemia-No Additional Notes    Sinusitis         Past Surgical History:  Past Surgical History:   Procedure Laterality Date    CARPAL TUNNEL RELEASE Left 2022    Procedure: RELEASE, CARPAL TUNNEL;  Surgeon: Daryl Glover MD;  Location: [facility];  Service: Orthopedics;  Laterality: Left;    CHOLECYSTECTOMY  2017    EYE SURGERY      LASIK    GALLBLADDER SURGERY  2017    hysteroscope      LAPAROSCOPY      dx  endometriosis    TUBAL LIGATION           Home Medications:  Prior to Admission medications    Medication Sig Start Date End Date Taking? Authorizing Provider   biotin 1 mg tablet Take 1,000 mcg by mouth 3 (three) times daily.   Yes Historical Provider   calcium citrate-vitamin D3 315-200 mg (CITRACAL+D) 315 mg-5 mcg (200 unit) per tablet Take 1 tablet by mouth 2 (two) times daily.   Yes Historical Provider   LACTOBACILLUS ACIDOPHILUS (PROBIOTIC ORAL) Take 1 tablet by mouth once daily.   Yes Historical Provider   metFORMIN (GLUCOPHAGE-XR) 500 MG ER 24hr tablet Take 1 tablet (500 mg total) by mouth daily with breakfast. 22  Yes Dianna Issa MD   pantoprazole (PROTONIX) 40 MG tablet Take 1 tablet (40 mg total) by mouth once daily. 3/23/23 3/22/24 Yes Fatmata Kwok PA-C   blood sugar diagnostic Strp To check BG 2 times daily, to use with insurance preferred meter 19   Arminda Mace MD   blood-glucose meter kit To check BG 2 times daily, to use with insurance preferred meter 5/27/19 11/3/22  Arminda Mace MD   lancets Misc To check BG 2 times daily, to use with insurance preferred meter 19   Arminda Mace MD   rhubarb root extract (ESTROVEN CMPLT MENOPAUSE RLF) 4 mg Tab  2/3/23   Historical Provider        Allergies:  Review of patient's allergies indicates:   Allergen Reactions    No known drug allergies         Social History:   Social History     Socioeconomic History    Marital status:    Tobacco Use    Smoking status: Former     Packs/day: 0.25     Years: 2.00     Pack years: 0.50     Types: Cigarettes     Quit date: 1999     Years since quittin.0    Smokeless tobacco: Never    Tobacco comments:     Does not smoke   Substance and Sexual Activity    Alcohol use: Yes     Comment: rarely    Drug use: No    Sexual activity: Yes     Partners: Male     Birth control/protection: None     Social Determinants of Health     Financial Resource Strain: Low Risk     Difficulty of  "Paying Living Expenses: Not hard at all   Food Insecurity: No Food Insecurity    Worried About Running Out of Food in the Last Year: Never true    Ran Out of Food in the Last Year: Never true   Transportation Needs: No Transportation Needs    Lack of Transportation (Medical): No    Lack of Transportation (Non-Medical): No   Physical Activity: Insufficiently Active    Days of Exercise per Week: 4 days    Minutes of Exercise per Session: 30 min   Stress: Stress Concern Present    Feeling of Stress : To some extent   Social Connections: Unknown    Frequency of Communication with Friends and Family: More than three times a week    Frequency of Social Gatherings with Friends and Family: Once a week    Active Member of Clubs or Organizations: Yes    Attends Club or Organization Meetings: More than 4 times per year    Marital Status:    Housing Stability: Low Risk     Unable to Pay for Housing in the Last Year: No    Number of Places Lived in the Last Year: 1    Unstable Housing in the Last Year: No       Family History:  Family History   Problem Relation Age of Onset    Stroke Paternal Grandmother     Cancer Maternal Grandmother         Cervical     Diabetes Maternal Grandfather     Cancer Maternal Grandfather         Prostate    Diabetes Father     Cancer Father         Renal     Diabetes Mother     Thyroid disease Mother     Ovarian cancer Other     Colon cancer Other     Breast cancer Other        ROS: No acute cardiac events, no acute respiratory complaints.     Physical Exam (all patients):    /68   Pulse 78   Temp 97.5 °F (36.4 °C)   Resp 16   Ht 5' 4" (1.626 m)   Wt 85.3 kg (188 lb)   SpO2 99%   Breastfeeding No   BMI 32.27 kg/m²   Lungs: Clear to auscultation bilaterally, respirations unlabored  Heart: Regular rate and rhythm, S1 and S2 normal, no obvious murmurs  Abdomen:         Soft, non-tender, bowel sounds normal, no masses, no organomegaly    Lab Results   Component Value Date    WBC " 6.78 06/28/2022    MCV 93 06/28/2022    RDW 13.1 06/28/2022     06/28/2022    GLU 82 06/28/2022    HGBA1C 5.4 06/28/2022    BUN 22 (H) 06/28/2022     06/28/2022    K 4.9 06/28/2022     06/28/2022        SEDATION PLAN: per anesthesia      History reviewed, vital signs satisfactory, cardiopulmonary status satisfactory, sedation options, risks and plans have been discussed with the patient  All their questions were answered and the patient agrees to the sedation procedures as planned and the patient is deemed an appropriate candidate for the sedation as planned.    The risks, benefits and alternatives of the procedure were discussed with the patient in detail. This discussion was had in the presence of her  and endoscopy staff. The risks include, risks of adverse reaction to sedation requiring the use of reversal agents, bleeding requiring blood transfusion, perforation requiring surgical intervention and technical failure. Other risks include aspiration leading to respiratory distress and respiratory failure resulting in endotracheal intubation and mechanical ventilation including death. If anesthesia is being utilized for this procedure, it is up to the anesthesiologist to determine airway safety including elective endotracheal intubation. Questions were answered, they agree to proceed. There was no language barriers.      Procedure explained to patient, informed consent obtained and placed in chart.    Eileen Yo  6/16/2023  9:32 AM

## 2023-06-19 ENCOUNTER — PATIENT MESSAGE (OUTPATIENT)
Dept: GASTROENTEROLOGY | Facility: CLINIC | Age: 48
End: 2023-06-19
Payer: COMMERCIAL

## 2023-06-19 VITALS
DIASTOLIC BLOOD PRESSURE: 67 MMHG | TEMPERATURE: 98 F | OXYGEN SATURATION: 96 % | WEIGHT: 188 LBS | RESPIRATION RATE: 16 BRPM | SYSTOLIC BLOOD PRESSURE: 102 MMHG | HEIGHT: 64 IN | BODY MASS INDEX: 32.1 KG/M2 | HEART RATE: 74 BPM

## 2023-06-21 ENCOUNTER — TELEPHONE (OUTPATIENT)
Dept: INTERNAL MEDICINE | Facility: CLINIC | Age: 48
End: 2023-06-21
Payer: COMMERCIAL

## 2023-06-21 ENCOUNTER — HOSPITAL ENCOUNTER (OUTPATIENT)
Dept: RADIOLOGY | Facility: HOSPITAL | Age: 48
Discharge: HOME | End: 2023-06-21
Attending: INTERNAL MEDICINE
Payer: COMMERCIAL

## 2023-06-21 DIAGNOSIS — R09.A2 GLOBUS SENSATION: ICD-10-CM

## 2023-06-21 DIAGNOSIS — E04.2 MULTIPLE THYROID NODULES: ICD-10-CM

## 2023-06-21 DIAGNOSIS — Z00.00 ROUTINE ADULT HEALTH MAINTENANCE: Primary | ICD-10-CM

## 2023-06-21 DIAGNOSIS — E88.819 INSULIN RESISTANCE: ICD-10-CM

## 2023-06-21 LAB
FINAL PATHOLOGIC DIAGNOSIS: NORMAL
GROSS: NORMAL
Lab: NORMAL
MICROSCOPIC EXAM: NORMAL

## 2023-06-21 PROCEDURE — 76536 US EXAM OF HEAD AND NECK: CPT | Mod: 26,,, | Performed by: RADIOLOGY

## 2023-06-21 PROCEDURE — 76536 US EXAM OF HEAD AND NECK: CPT | Mod: TC

## 2023-06-21 PROCEDURE — 76536 US SOFT TISSUE HEAD NECK THYROID: ICD-10-PCS | Mod: 26,,, | Performed by: RADIOLOGY

## 2023-06-21 NOTE — PROGRESS NOTES
The US of your thyroid shows a few modules. This is likely the cause of the sensation of you are feeling as if there is something in your throat. I recommend thyroid studies and referral to endocrinology. I will communicate with Dr. Issa's office about these requests. No additional GI work up is needed at this time. Take care.

## 2023-06-21 NOTE — TELEPHONE ENCOUNTER
----- Message from Dianna Issa MD sent at 6/21/2023  2:03 PM CDT -----  Notify I received a message about her thyroid ultrasound.  We need to check her thyroid levels, had these ordered in addition to her regular yearly labs.  Have her scheduled for routine fasting labs and then follow-up with me so we can discuss this

## 2023-06-22 ENCOUNTER — LAB VISIT (OUTPATIENT)
Dept: LAB | Facility: HOSPITAL | Age: 48
End: 2023-06-22
Attending: FAMILY MEDICINE
Payer: COMMERCIAL

## 2023-06-22 DIAGNOSIS — E88.819 INSULIN RESISTANCE: ICD-10-CM

## 2023-06-22 DIAGNOSIS — E04.2 MULTIPLE THYROID NODULES: ICD-10-CM

## 2023-06-22 DIAGNOSIS — Z00.00 ROUTINE ADULT HEALTH MAINTENANCE: ICD-10-CM

## 2023-06-22 LAB
ALBUMIN SERPL BCP-MCNC: 4 G/DL (ref 3.5–5.2)
ALP SERPL-CCNC: 71 U/L (ref 55–135)
ALT SERPL W/O P-5'-P-CCNC: 16 U/L (ref 10–44)
ANION GAP SERPL CALC-SCNC: 9 MMOL/L (ref 8–16)
AST SERPL-CCNC: 16 U/L (ref 10–40)
BASOPHILS # BLD AUTO: 0.05 K/UL (ref 0–0.2)
BASOPHILS NFR BLD: 1 % (ref 0–1.9)
BILIRUB SERPL-MCNC: 0.4 MG/DL (ref 0.1–1)
BUN SERPL-MCNC: 14 MG/DL (ref 6–20)
CALCIUM SERPL-MCNC: 9.9 MG/DL (ref 8.7–10.5)
CHLORIDE SERPL-SCNC: 108 MMOL/L (ref 95–110)
CHOLEST SERPL-MCNC: 228 MG/DL (ref 120–199)
CHOLEST/HDLC SERPL: 3.1 {RATIO} (ref 2–5)
CO2 SERPL-SCNC: 27 MMOL/L (ref 23–29)
CREAT SERPL-MCNC: 0.9 MG/DL (ref 0.5–1.4)
DIFFERENTIAL METHOD: ABNORMAL
EOSINOPHIL # BLD AUTO: 0.1 K/UL (ref 0–0.5)
EOSINOPHIL NFR BLD: 1.8 % (ref 0–8)
ERYTHROCYTE [DISTWIDTH] IN BLOOD BY AUTOMATED COUNT: 12.3 % (ref 11.5–14.5)
EST. GFR  (NO RACE VARIABLE): >60 ML/MIN/1.73 M^2
ESTIMATED AVG GLUCOSE: 103 MG/DL (ref 68–131)
GLUCOSE SERPL-MCNC: 87 MG/DL (ref 70–110)
HBA1C MFR BLD: 5.2 % (ref 4–5.6)
HCT VFR BLD AUTO: 41.3 % (ref 37–48.5)
HDLC SERPL-MCNC: 74 MG/DL (ref 40–75)
HDLC SERPL: 32.5 % (ref 20–50)
HGB BLD-MCNC: 12.9 G/DL (ref 12–16)
IMM GRANULOCYTES # BLD AUTO: 0.01 K/UL (ref 0–0.04)
IMM GRANULOCYTES NFR BLD AUTO: 0.2 % (ref 0–0.5)
LDLC SERPL CALC-MCNC: 131.4 MG/DL (ref 63–159)
LYMPHOCYTES # BLD AUTO: 1.6 K/UL (ref 1–4.8)
LYMPHOCYTES NFR BLD: 31.9 % (ref 18–48)
MCH RBC QN AUTO: 29.3 PG (ref 27–31)
MCHC RBC AUTO-ENTMCNC: 31.2 G/DL (ref 32–36)
MCV RBC AUTO: 94 FL (ref 82–98)
MONOCYTES # BLD AUTO: 0.3 K/UL (ref 0.3–1)
MONOCYTES NFR BLD: 6 % (ref 4–15)
NEUTROPHILS # BLD AUTO: 3 K/UL (ref 1.8–7.7)
NEUTROPHILS NFR BLD: 59.1 % (ref 38–73)
NONHDLC SERPL-MCNC: 154 MG/DL
NRBC BLD-RTO: 0 /100 WBC
PLATELET # BLD AUTO: 293 K/UL (ref 150–450)
PMV BLD AUTO: 10.6 FL (ref 9.2–12.9)
POTASSIUM SERPL-SCNC: 4.3 MMOL/L (ref 3.5–5.1)
PROT SERPL-MCNC: 7.2 G/DL (ref 6–8.4)
RBC # BLD AUTO: 4.4 M/UL (ref 4–5.4)
SODIUM SERPL-SCNC: 144 MMOL/L (ref 136–145)
T3 SERPL-MCNC: 90 NG/DL (ref 60–180)
T4 FREE SERPL-MCNC: 0.82 NG/DL (ref 0.71–1.51)
TRIGL SERPL-MCNC: 113 MG/DL (ref 30–150)
TSH SERPL DL<=0.005 MIU/L-ACNC: 1.58 UIU/ML (ref 0.4–4)
WBC # BLD AUTO: 4.99 K/UL (ref 3.9–12.7)

## 2023-06-22 PROCEDURE — 85025 COMPLETE CBC W/AUTO DIFF WBC: CPT | Performed by: FAMILY MEDICINE

## 2023-06-22 PROCEDURE — 80053 COMPREHEN METABOLIC PANEL: CPT | Performed by: FAMILY MEDICINE

## 2023-06-22 PROCEDURE — 80061 LIPID PANEL: CPT | Performed by: FAMILY MEDICINE

## 2023-06-22 PROCEDURE — 84443 ASSAY THYROID STIM HORMONE: CPT | Performed by: FAMILY MEDICINE

## 2023-06-22 PROCEDURE — 84480 ASSAY TRIIODOTHYRONINE (T3): CPT | Performed by: FAMILY MEDICINE

## 2023-06-22 PROCEDURE — 36415 COLL VENOUS BLD VENIPUNCTURE: CPT | Mod: PO | Performed by: FAMILY MEDICINE

## 2023-06-22 PROCEDURE — 83036 HEMOGLOBIN GLYCOSYLATED A1C: CPT | Performed by: FAMILY MEDICINE

## 2023-06-22 PROCEDURE — 84439 ASSAY OF FREE THYROXINE: CPT | Performed by: FAMILY MEDICINE

## 2023-06-23 NOTE — PROGRESS NOTES
The biopsies of your stomach show no signs of infection. Please keep your appointment with Dr. Issa next week to determine if additional testing of the thyroid nodules needs to be performed or if a referral to a Ear Nose and Throat (ENT) is indicated. No further GI work up is indicated at this time. Take care.

## 2023-06-28 ENCOUNTER — OFFICE VISIT (OUTPATIENT)
Dept: INTERNAL MEDICINE | Facility: CLINIC | Age: 48
End: 2023-06-28
Payer: COMMERCIAL

## 2023-06-28 ENCOUNTER — PATIENT MESSAGE (OUTPATIENT)
Dept: INTERNAL MEDICINE | Facility: CLINIC | Age: 48
End: 2023-06-28

## 2023-06-28 VITALS
OXYGEN SATURATION: 98 % | HEART RATE: 60 BPM | TEMPERATURE: 96 F | BODY MASS INDEX: 32.37 KG/M2 | SYSTOLIC BLOOD PRESSURE: 110 MMHG | HEIGHT: 64 IN | WEIGHT: 189.63 LBS | DIASTOLIC BLOOD PRESSURE: 70 MMHG

## 2023-06-28 DIAGNOSIS — E04.2 MULTIPLE THYROID NODULES: ICD-10-CM

## 2023-06-28 DIAGNOSIS — Z00.00 ROUTINE ADULT HEALTH MAINTENANCE: Primary | ICD-10-CM

## 2023-06-28 DIAGNOSIS — E88.819 INSULIN RESISTANCE: ICD-10-CM

## 2023-06-28 DIAGNOSIS — R09.A2 GLOBUS SENSATION: ICD-10-CM

## 2023-06-28 PROCEDURE — 99999 PR PBB SHADOW E&M-EST. PATIENT-LVL III: CPT | Mod: PBBFAC,,, | Performed by: FAMILY MEDICINE

## 2023-06-28 PROCEDURE — 3008F BODY MASS INDEX DOCD: CPT | Mod: CPTII,S$GLB,, | Performed by: FAMILY MEDICINE

## 2023-06-28 PROCEDURE — 99999 PR PBB SHADOW E&M-EST. PATIENT-LVL III: ICD-10-PCS | Mod: PBBFAC,,, | Performed by: FAMILY MEDICINE

## 2023-06-28 PROCEDURE — 3044F PR MOST RECENT HEMOGLOBIN A1C LEVEL <7.0%: ICD-10-PCS | Mod: CPTII,S$GLB,, | Performed by: FAMILY MEDICINE

## 2023-06-28 PROCEDURE — 3074F SYST BP LT 130 MM HG: CPT | Mod: CPTII,S$GLB,, | Performed by: FAMILY MEDICINE

## 2023-06-28 PROCEDURE — 3008F PR BODY MASS INDEX (BMI) DOCUMENTED: ICD-10-PCS | Mod: CPTII,S$GLB,, | Performed by: FAMILY MEDICINE

## 2023-06-28 PROCEDURE — 3078F PR MOST RECENT DIASTOLIC BLOOD PRESSURE < 80 MM HG: ICD-10-PCS | Mod: CPTII,S$GLB,, | Performed by: FAMILY MEDICINE

## 2023-06-28 PROCEDURE — 99396 PREV VISIT EST AGE 40-64: CPT | Mod: S$GLB,,, | Performed by: FAMILY MEDICINE

## 2023-06-28 PROCEDURE — 3074F PR MOST RECENT SYSTOLIC BLOOD PRESSURE < 130 MM HG: ICD-10-PCS | Mod: CPTII,S$GLB,, | Performed by: FAMILY MEDICINE

## 2023-06-28 PROCEDURE — 99396 PR PREVENTIVE VISIT,EST,40-64: ICD-10-PCS | Mod: S$GLB,,, | Performed by: FAMILY MEDICINE

## 2023-06-28 PROCEDURE — 3044F HG A1C LEVEL LT 7.0%: CPT | Mod: CPTII,S$GLB,, | Performed by: FAMILY MEDICINE

## 2023-06-28 PROCEDURE — 3078F DIAST BP <80 MM HG: CPT | Mod: CPTII,S$GLB,, | Performed by: FAMILY MEDICINE

## 2023-06-28 RX ORDER — VITAMIN E 268 MG
400 CAPSULE ORAL DAILY
COMMUNITY

## 2023-06-28 RX ORDER — METFORMIN HYDROCHLORIDE 500 MG/1
500 TABLET, EXTENDED RELEASE ORAL
Qty: 90 TABLET | Refills: 3 | Status: SHIPPED | OUTPATIENT
Start: 2023-06-28

## 2023-06-28 RX ORDER — MELATON/B.COHOSH/VALERIAN/HOPS 3 MG-40 MG
CAPSULE ORAL
COMMUNITY

## 2023-06-28 NOTE — PROGRESS NOTES
Subjective:      Patient ID: Josh Simental is a 48 y.o. female.    Chief Complaint: Follow-up      Patient here for routine wellness exam and follow-up.  Reviewed labs recently drawn with the patient, lipids increased but still within range.  Everything else looks good.  She recently had a thyroid ultrasound ordered by her gastroenterologist-did show 10 mm nodule, recommended repeat ultrasound in 1 year  Reports continues to have sensation of something pressing in her throat    Follow-up  Pertinent negatives include no abdominal pain.   Review of Systems   Constitutional:  Negative for activity change and appetite change.   Respiratory:  Negative for shortness of breath.    Cardiovascular:  Negative for leg swelling.   Gastrointestinal:  Negative for abdominal pain.   Past Medical History:   Diagnosis Date    Allergy     Anxiety     Depression     Endometriosis     Endometriosis 4/17/2019 10:14:15 AM    [facility] [employer] Historical - DO NOT USE: Endometriosis-No Additional Notes    Insulin resistance     Migraines     Other depressive disorder 4/17/2019 10:12:51 AM    [facility] [employer] Historical - [facility]: Depression-No Additional Notes    Pure hypercholesterolemia 6/30/2021 9:34:13 AM    [facility] [employer] Historical - Endocrine/Metabolic/Immune: Hypercholesterolemia-No Additional Notes    Sinusitis           Past Surgical History:   Procedure Laterality Date    CARPAL TUNNEL RELEASE Left 07/14/2022    Procedure: RELEASE, CARPAL TUNNEL;  Surgeon: Daryl Glover MD;  Location: [facility];  Service: Orthopedics;  Laterality: Left;    CHOLECYSTECTOMY  07/2017    ESOPHAGOGASTRODUODENOSCOPY N/A 6/16/2023    Procedure: EGD (ESOPHAGOGASTRODUODENOSCOPY);  Surgeon: Eileen Yo MD;  Location: [facility];  Service: Endoscopy;  Laterality: N/A;    EYE SURGERY      LASIK    GALLBLADDER SURGERY  07/2017    hysteroscope      LAPAROSCOPY  1994    dx 2/2 endometriosis    TUBAL LIGATION       Family History   Problem Relation Age  of Onset    Stroke Paternal Grandmother     Cancer Maternal Grandmother         Cervical     Diabetes Maternal Grandfather     Cancer Maternal Grandfather         Prostate    Diabetes Father     Cancer Father         Renal     Diabetes Mother     Thyroid disease Mother     Ovarian cancer Other     Colon cancer Other     Breast cancer Other      Social History     Socioeconomic History    Marital status:    Tobacco Use    Smoking status: Former     Packs/day: 0.25     Years: 2.00     Pack years: 0.50     Types: Cigarettes     Quit date: 1999     Years since quittin.0    Smokeless tobacco: Never    Tobacco comments:     Does not smoke   Substance and Sexual Activity    Alcohol use: Yes     Comment: rarely    Drug use: No    Sexual activity: Yes     Partners: Male     Birth control/protection: None     Social Determinants of Health     Financial Resource Strain: Low Risk     Difficulty of Paying Living Expenses: Not hard at all   Food Insecurity: No Food Insecurity    Worried About Running Out of Food in the Last Year: Never true    Ran Out of Food in the Last Year: Never true   Transportation Needs: No Transportation Needs    Lack of Transportation (Medical): No    Lack of Transportation (Non-Medical): No   Physical Activity: Insufficiently Active    Days of Exercise per Week: 3 days    Minutes of Exercise per Session: 30 min   Stress: No Stress Concern Present    Feeling of Stress : Only a little   Social Connections: Unknown    Frequency of Communication with Friends and Family: More than three times a week    Frequency of Social Gatherings with Friends and Family: Once a week    Active Member of Clubs or Organizations: Yes    Attends Club or Organization Meetings: More than 4 times per year    Marital Status:    Housing Stability: Low Risk     Unable to Pay for Housing in the Last Year: No    Number of Places Lived in the Last Year: 1    Unstable Housing in the Last Year: No     Review of  "patient's allergies indicates:   Allergen Reactions    No known drug allergies        Objective:       /70 (BP Location: Left arm, Patient Position: Sitting, BP Method: Large (Manual))   Pulse 60   Temp 96.2 °F (35.7 °C) (Tympanic)   Ht 5' 4" (1.626 m)   Wt 86 kg (189 lb 9.5 oz)   SpO2 98%   BMI 32.54 kg/m²   Physical Exam  Vitals reviewed.   Constitutional:       General: She is not in acute distress.     Appearance: Normal appearance. She is well-developed. She is not ill-appearing or diaphoretic.   HENT:      Head: Normocephalic and atraumatic.      Right Ear: Hearing, tympanic membrane, ear canal and external ear normal.      Left Ear: Hearing, tympanic membrane, ear canal and external ear normal.      Nose: Nose normal.      Mouth/Throat:      Pharynx: Uvula midline. No oropharyngeal exudate.   Eyes:      Conjunctiva/sclera: Conjunctivae normal.      Pupils: Pupils are equal, round, and reactive to light.   Neck:      Thyroid: No thyromegaly.      Trachea: No tracheal deviation.   Cardiovascular:      Rate and Rhythm: Normal rate and regular rhythm.      Heart sounds: Normal heart sounds. No murmur heard.  Pulmonary:      Effort: Pulmonary effort is normal. No respiratory distress.      Breath sounds: Normal breath sounds.   Abdominal:      General: Bowel sounds are normal.      Palpations: Abdomen is soft.      Tenderness: There is no abdominal tenderness. There is no guarding.      Hernia: No hernia is present.   Musculoskeletal:         General: Normal range of motion.      Cervical back: Normal range of motion and neck supple.      Right lower leg: No edema.      Left lower leg: No edema.   Lymphadenopathy:      Cervical: No cervical adenopathy.   Skin:     General: Skin is warm and dry.      Capillary Refill: Capillary refill takes less than 2 seconds.   Neurological:      General: No focal deficit present.      Mental Status: She is alert and oriented to person, place, and time.   Psychiatric: "         Mood and Affect: Mood normal.         Behavior: Behavior normal.         Thought Content: Thought content normal.         Judgment: Judgment normal.     Assessment:     1. Routine adult health maintenance    2. Globus sensation    3. Insulin resistance    4. Multiple thyroid nodules      Plan:   Routine adult health maintenance    Globus sensation  -     Ambulatory referral/consult to ENT; Future; Expected date: 07/05/2023    Insulin resistance    Multiple thyroid nodules    Other orders  -     metFORMIN (GLUCOPHAGE-XR) 500 MG ER 24hr tablet; Take 1 tablet (500 mg total) by mouth daily with breakfast.  Dispense: 90 tablet; Refill: 3    Continue all other current medications.   Will plan to recheck thyroid ultrasound in 1 year  Medication List with Changes/Refills   Current Medications    BIOTIN 1 MG TABLET    Take 1,000 mcg by mouth 3 (three) times daily.    BLOOD SUGAR DIAGNOSTIC STRP    To check BG 2 times daily, to use with insurance preferred meter    BLOOD-GLUCOSE METER KIT    To check BG 2 times daily, to use with insurance preferred meter    CALCIUM CITRATE-VITAMIN D3 315-200 MG (CITRACAL+D) 315 MG-5 MCG (200 UNIT) PER TABLET    Take 1 tablet by mouth 2 (two) times daily.    LACTOBACILLUS ACIDOPHILUS (PROBIOTIC ORAL)    Take 1 tablet by mouth once daily.    LANCETS MISC    To check BG 2 times daily, to use with insurance preferred meter    SOY ISOFLA-BLK COHOSH-MAG BARK (ESTROVEN) 155 MG CAP    Take by mouth.    VITAMIN E 400 UNIT CAPSULE    Take 400 Units by mouth once daily.   Changed and/or Refilled Medications    Modified Medication Previous Medication    METFORMIN (GLUCOPHAGE-XR) 500 MG ER 24HR TABLET metFORMIN (GLUCOPHAGE-XR) 500 MG ER 24hr tablet       Take 1 tablet (500 mg total) by mouth daily with breakfast.    Take 1 tablet (500 mg total) by mouth daily with breakfast.

## 2023-06-29 ENCOUNTER — OFFICE VISIT (OUTPATIENT)
Dept: OTOLARYNGOLOGY | Facility: CLINIC | Age: 48
End: 2023-06-29
Payer: COMMERCIAL

## 2023-06-29 VITALS — WEIGHT: 190.5 LBS | BODY MASS INDEX: 32.7 KG/M2 | TEMPERATURE: 97 F

## 2023-06-29 DIAGNOSIS — R09.A2 GLOBUS SENSATION: ICD-10-CM

## 2023-06-29 DIAGNOSIS — R49.0 DYSPHONIA: ICD-10-CM

## 2023-06-29 DIAGNOSIS — J30.89 NON-SEASONAL ALLERGIC RHINITIS, UNSPECIFIED TRIGGER: Primary | ICD-10-CM

## 2023-06-29 PROCEDURE — 31575 DIAGNOSTIC LARYNGOSCOPY: CPT | Mod: S$GLB,,, | Performed by: OTOLARYNGOLOGY

## 2023-06-29 PROCEDURE — 31575 PR LARYNGOSCOPY, FLEXIBLE; DIAGNOSTIC: ICD-10-PCS | Mod: S$GLB,,, | Performed by: OTOLARYNGOLOGY

## 2023-06-29 PROCEDURE — 1159F PR MEDICATION LIST DOCUMENTED IN MEDICAL RECORD: ICD-10-PCS | Mod: CPTII,S$GLB,, | Performed by: OTOLARYNGOLOGY

## 2023-06-29 PROCEDURE — 3008F BODY MASS INDEX DOCD: CPT | Mod: CPTII,S$GLB,, | Performed by: OTOLARYNGOLOGY

## 2023-06-29 PROCEDURE — 3044F HG A1C LEVEL LT 7.0%: CPT | Mod: CPTII,S$GLB,, | Performed by: OTOLARYNGOLOGY

## 2023-06-29 PROCEDURE — 3044F PR MOST RECENT HEMOGLOBIN A1C LEVEL <7.0%: ICD-10-PCS | Mod: CPTII,S$GLB,, | Performed by: OTOLARYNGOLOGY

## 2023-06-29 PROCEDURE — 99999 PR PBB SHADOW E&M-EST. PATIENT-LVL III: CPT | Mod: PBBFAC,,, | Performed by: OTOLARYNGOLOGY

## 2023-06-29 PROCEDURE — 99204 PR OFFICE/OUTPT VISIT, NEW, LEVL IV, 45-59 MIN: ICD-10-PCS | Mod: 25,S$GLB,, | Performed by: OTOLARYNGOLOGY

## 2023-06-29 PROCEDURE — 1159F MED LIST DOCD IN RCRD: CPT | Mod: CPTII,S$GLB,, | Performed by: OTOLARYNGOLOGY

## 2023-06-29 PROCEDURE — 3008F PR BODY MASS INDEX (BMI) DOCUMENTED: ICD-10-PCS | Mod: CPTII,S$GLB,, | Performed by: OTOLARYNGOLOGY

## 2023-06-29 PROCEDURE — 99204 OFFICE O/P NEW MOD 45 MIN: CPT | Mod: 25,S$GLB,, | Performed by: OTOLARYNGOLOGY

## 2023-06-29 PROCEDURE — 99999 PR PBB SHADOW E&M-EST. PATIENT-LVL III: ICD-10-PCS | Mod: PBBFAC,,, | Performed by: OTOLARYNGOLOGY

## 2023-06-29 NOTE — PROGRESS NOTES
Referring Provider:    Dianna Issa Md  [id number] [place] [address]  Subjective:   Patient: Josh Simental [id number], :1975   Visit date:2023 8:38 AM    Chief Complaint:  Other (Pt states that it feels like there's something stuck in her throat like something is pressing on it with some hoarseness)    HPI:    Prior notes reviewed by myself.  Clinical documentation obtained by nursing staff reviewed.     48-year-old female presents for evaluation of globus sensation, throat clearing.  She also reports intermittent hoarseness that is worse first thing in the morning and late in the evening. She has not noticed a definite association with meals.   This has been an issue for over a year.  She has had a GI evaluation including EGD and at least 6 month trial of omeprazole without significant improvement.  She has a history of allergic rhinitis and prior allergy testing which showed that she was allergic to everything outside.  She is not currently taking any allergy medication.  She was on sublingual immunotherapy for a period of time which she felt helped with her symptoms.  She does not have a history of recurrent acute or chronic sinusitis.      Objective:     Physical Exam:  Vitals:  Temp 97.2 °F (36.2 °C) (Tympanic)   Wt 86.4 kg (190 lb 7.6 oz)   BMI 32.70 kg/m²   General appearance:  Well developed, well nourished    Ears:  Otoscopy of external auditory canals and tympanic membranes was normal, clinical speech reception thresholds grossly intact, no mass/lesion of auricle.    Nose:  No masses/lesions of external nose, congested nasal mucosa, septum, and turbinates were within normal limits. Clear rhinorrhea    Mouth:  No mass/lesion of lips, teeth, gums, hard/soft palate, tongue, tonsils, or oropharynx.    Neck & Lymphatics:  No cervical lymphadenopathy, no neck mass/crepitus/ asymmetry, trachea is midline, no thyroid enlargement/tenderness/mass.    Due to indication in  patient's history, presentation or risk factors,  a fiber optic exam was performed.    SEPARATE PROCEDURE NOTE:    ANESTHESIA:  Topical xylocaine with eriberto-synephrine  FINDINGS:  Normal exam      PROCEDURE:  After verbal consent was obtained, the flexible scope was passed through the patient's nasal cavity without difficulty.  The nasopharynx (adenoid pad) and eustachian tube orifices were first visualized and were found to be normal, without masses or irregularity.  The posterior pharyngeal wall and base of tongue were then examined and no mass or irregular tissue was seen.  The scope was then advanced to the larynx, and the epiglottis, valleculae, and piriform sinuses were normal, without masses or mucosal irregularity.  The false vocal folds and true vocal folds were then examined and were found to have normal mobility (full abduction and adduction) and no masses or mucosal irregularity was seen.  The arytenoids and the interarytenoid area were normal.      [x]  Data Reviewed:    Lab Results   Component Value Date    WBC 4.99 06/22/2023    HGB 12.9 06/22/2023    HCT 41.3 06/22/2023    MCV 94 06/22/2023    EOSINOPHIL 1.8 06/22/2023        US Soft Tissue Head Neck Thyroid  Status: Final result     MyChart Results Release    MyChart Status: Active  Results Release       US Soft Tissue Head Neck Thyroid: Result Notes     Parul Barlow LPN   6/21/2023 12:49 PM CDT       Patient reviewed results on my chart.    Sent a msg to pt on my chart to contact the office if she has any questions  or concerns.     Eileen Yo MD   6/21/2023 11:40 AM CDT       The US of your thyroid shows a few modules. This is likely the cause of the sensation of you are feeling as if there is something in your throat. I recommend thyroid studies and referral to endocrinology. I will communicate with Dr. Issa's office about these requests. No additional GI work up is needed at this time. Take care.             PACS Images for [facility]  [place] Viewer     Show images for US Soft Tissue Head Neck Thyroid  All Reviewers List    Dianna Issa MD on 6/21/2023 14:04   Dianna Issa MD on 6/21/2023 12:05     US Soft Tissue Head Neck Thyroid  Order: [id number]  Status: Final result     Visible to patient: Yes (seen)     Next appt: 09/21/2023 at 08:30 AM in Otolaryngology (Alex Alvarez MD)     Dx: Globus sensation     2 Result Notes    1 Patient Communication  Details    Reading Physician Reading Date Result Priority   SENIA Ness Sr., MD  [phone] 6/21/2023 Routine     Narrative & Impression  EXAMINATION:  US SOFT TISSUE HEAD NECK THYROID     CLINICAL HISTORY:  globus sensation, neg EGD eval for goiter or neck mass;     TECHNIQUE:  Multiple static ultrasound images are submitted for interpretation.     COMPARISON:  None     FINDINGS:  The thyroid is heterogeneous in appearance.  There is a 10 mm slightly hypoechoic nodule in the inferior pole of the left lobe of the thyroid.     The right lobe of the thyroid measures 4.8 cm in craniocaudal dimension by 2.1 cm in AP dimension by 2.0 cm in mediolateral dimension.  The isthmus measures 4 mm in AP dimension.  The left lobe of the thyroid measures 4.8 cm in craniocaudal dimension by 1.8 cm in AP dimension by 2.0 cm in mediolateral dimension.     Impression:     1. There is a 10 mm slightly hypoechoic nodule in the inferior pole of the left lobe of the thyroid.  2. The finding corresponds with a TI-RADS level 4, moderately suspicious.  Based on its size I recommend consideration of a follow-up ultrasound examination in 1 year.  3. The thyroid is heterogeneous in appearance.        Electronically signed by: Ras Ness MD  Date:                                            06/21/2023  Time:                                           08:10           Exam Ended: 06/21/23 07:57 Last Resulted: 06/21/23 08:10       Order Details     View Encounter     Lab and Collection Details     Routing      Result History     View Encounter Conversation           Result Care Coordination      Result Notes     Parul Barlow LPN   6/21/2023 12:49 PM CDT Back to Top      Patient reviewed results on my chart.    Sent a msg to pt on my chart to contact the office if she has any questions  or concerns.     Eileen Yo MD   6/21/2023 11:40 AM CDT       The US of your thyroid shows a few modules. This is likely the cause of the sensation of you are feeling as if there is something in your throat. I recommend thyroid studies and referral to endocrinology. I will communicate with Dr. Issa's office about these requests. No additional GI work up is needed at this time. Take care.           Patient Communication     Append Comments   Seen Back to Top    The US of your thyroid shows a few modules. This is likely the cause of the sensation of you are feeling as if there is something in your throat. I recommend thyroid studies and referral to endocrinology. I will communicate with Dr. Issa's office about these requests. No additional GI work up is needed at this time. Take care. Per Dr Yo.  ...   Written by Parul Barlow LPN on 6/21/2023 12:49 PM CDT View Full Comments  Seen by patient Josh Simental on 6/21/2023  1:02 PM            US Soft Tissue Head Neck Thyroid: Patient Communication     Append Comments   Seen    The US of your thyroid shows a few modules. This is likely the cause of the sensation of you are feeling as if there is something in your throat. I recommend thyroid studies and referral to endocrinology. I will communicate with Dr. Issa's office about these requests. No additional GI work up is needed at this time. Take care. Per Dr Yo.   Please let me know if you need me to help with getting you in contact with Dr Issa's office. Thank you and have a good day   Written by Parul Barlow LPN on 6/21/2023 12:49 PM CDT  Seen by patient Josh Simental on 6/21/2023   1:02 PM    External Result Report    External Result Report     Narrative & Impression    EXAMINATION:  US SOFT TISSUE HEAD NECK THYROID     CLINICAL HISTORY:  globus sensation, neg EGD eval for goiter or neck mass;     TECHNIQUE:  Multiple static ultrasound images are submitted for interpretation.     COMPARISON:  None     FINDINGS:  The thyroid is heterogeneous in appearance.  There is a 10 mm slightly hypoechoic nodule in the inferior pole of the left lobe of the thyroid.     The right lobe of the thyroid measures 4.8 cm in craniocaudal dimension by 2.1 cm in AP dimension by 2.0 cm in mediolateral dimension.  The isthmus measures 4 mm in AP dimension.  The left lobe of the thyroid measures 4.8 cm in craniocaudal dimension by 1.8 cm in AP dimension by 2.0 cm in mediolateral dimension.     Impression:     1. There is a 10 mm slightly hypoechoic nodule in the inferior pole of the left lobe of the thyroid.  2. The finding corresponds with a TI-RADS level 4, moderately suspicious.  Based on its size I recommend consideration of a follow-up ultrasound examination in 1 year.  3. The thyroid is heterogeneous in appearance.        Electronically signed by: Ras Ness MD  Date:                                            06/21/2023  Time:                                           08:10    Encounter    View Encounter             Signed by    Signed Time Phone Pager   SENIA Ness Sr., MD 6/21/2023 08:10 [phone]      Reviewed By    Dianna Issa MD on 6/21/2023 14:04   Dianna Issa MD on 6/21/2023 12:05           Exam Details    Performed Procedure Technologist Supporting Staff Performing Physician   US Soft Tissue Head Neck Thyroid Ying Agrawal, RT           Appointment Date/Status Modality Department    6/21/2023     Completed [facility] US1 [facility] ULTRASOUND           Begin Exam End Exam Begin Exam Questionnaires End Exam Questionnaires   6/21/2023  7:54 AM 6/21/2023  7:57 AM RIS PREGNANCY TECH  NAVIGATOR IMAGING END ALL              Reason for Exam  Priority: Routine  globus sensation, neg EGD eval for goiter or neck mass   Dx: Globus sensation [R09.89 (ICD-10-CM)]         Order Report     Order Details        Reviewed EGD report    Assessment & Plan:   Non-seasonal allergic rhinitis, unspecified trigger  -     Ambulatory referral/consult to Allergy; Future; Expected date: 07/06/2023    Globus sensation  -     Ambulatory referral/consult to ENT        We reviewed her history, exam, endoscopy findings.  I have also reviewed her EGD report and ultrasound of her thyroid.  Her EGD was fairly unremarkable and she has not responded to 6 months or more of proton pump inhibitors.  Her thyroid ultrasound also did not demonstrate findings that would explain her symptoms.  She does have a history of severe environmental allergies, so I recommended that we revisit that and repeat allergy testing.

## 2023-06-30 ENCOUNTER — PATIENT MESSAGE (OUTPATIENT)
Dept: OTOLARYNGOLOGY | Facility: CLINIC | Age: 48
End: 2023-06-30
Payer: COMMERCIAL

## 2023-08-31 ENCOUNTER — HOSPITAL ENCOUNTER (OUTPATIENT)
Dept: RADIOLOGY | Facility: HOSPITAL | Age: 48
Discharge: HOME | End: 2023-08-31
Attending: OBSTETRICS & GYNECOLOGY
Payer: COMMERCIAL

## 2023-08-31 ENCOUNTER — OFFICE VISIT (OUTPATIENT)
Dept: OBSTETRICS AND GYNECOLOGY | Facility: CLINIC | Age: 48
End: 2023-08-31
Payer: COMMERCIAL

## 2023-08-31 VITALS
DIASTOLIC BLOOD PRESSURE: 72 MMHG | HEIGHT: 64 IN | BODY MASS INDEX: 32.82 KG/M2 | SYSTOLIC BLOOD PRESSURE: 108 MMHG | WEIGHT: 192.25 LBS

## 2023-08-31 DIAGNOSIS — Z12.31 BREAST CANCER SCREENING BY MAMMOGRAM: ICD-10-CM

## 2023-08-31 DIAGNOSIS — Z01.419 WELL WOMAN EXAM WITH ROUTINE GYNECOLOGICAL EXAM: Primary | ICD-10-CM

## 2023-08-31 DIAGNOSIS — Z12.4 CERVICAL CANCER SCREENING: ICD-10-CM

## 2023-08-31 PROCEDURE — 99999 PR PBB SHADOW E&M-EST. PATIENT-LVL III: ICD-10-PCS | Mod: PBBFAC,,, | Performed by: OBSTETRICS & GYNECOLOGY

## 2023-08-31 PROCEDURE — 77067 MAMMO DIGITAL SCREENING BILAT WITH TOMO: ICD-10-PCS | Mod: 26,,, | Performed by: RADIOLOGY

## 2023-08-31 PROCEDURE — 1160F RVW MEDS BY RX/DR IN RCRD: CPT | Mod: CPTII,S$GLB,, | Performed by: OBSTETRICS & GYNECOLOGY

## 2023-08-31 PROCEDURE — 99396 PREV VISIT EST AGE 40-64: CPT | Mod: S$GLB,,, | Performed by: OBSTETRICS & GYNECOLOGY

## 2023-08-31 PROCEDURE — 3078F DIAST BP <80 MM HG: CPT | Mod: CPTII,S$GLB,, | Performed by: OBSTETRICS & GYNECOLOGY

## 2023-08-31 PROCEDURE — 1159F MED LIST DOCD IN RCRD: CPT | Mod: CPTII,S$GLB,, | Performed by: OBSTETRICS & GYNECOLOGY

## 2023-08-31 PROCEDURE — 99999 PR PBB SHADOW E&M-EST. PATIENT-LVL III: CPT | Mod: PBBFAC,,, | Performed by: OBSTETRICS & GYNECOLOGY

## 2023-08-31 PROCEDURE — 3074F PR MOST RECENT SYSTOLIC BLOOD PRESSURE < 130 MM HG: ICD-10-PCS | Mod: CPTII,S$GLB,, | Performed by: OBSTETRICS & GYNECOLOGY

## 2023-08-31 PROCEDURE — 77063 BREAST TOMOSYNTHESIS BI: CPT | Mod: 26,,, | Performed by: RADIOLOGY

## 2023-08-31 PROCEDURE — 3044F PR MOST RECENT HEMOGLOBIN A1C LEVEL <7.0%: ICD-10-PCS | Mod: CPTII,S$GLB,, | Performed by: OBSTETRICS & GYNECOLOGY

## 2023-08-31 PROCEDURE — 3044F HG A1C LEVEL LT 7.0%: CPT | Mod: CPTII,S$GLB,, | Performed by: OBSTETRICS & GYNECOLOGY

## 2023-08-31 PROCEDURE — 3008F PR BODY MASS INDEX (BMI) DOCUMENTED: ICD-10-PCS | Mod: CPTII,S$GLB,, | Performed by: OBSTETRICS & GYNECOLOGY

## 2023-08-31 PROCEDURE — 88175 CYTOPATH C/V AUTO FLUID REDO: CPT | Performed by: OBSTETRICS & GYNECOLOGY

## 2023-08-31 PROCEDURE — 3008F BODY MASS INDEX DOCD: CPT | Mod: CPTII,S$GLB,, | Performed by: OBSTETRICS & GYNECOLOGY

## 2023-08-31 PROCEDURE — 77063 MAMMO DIGITAL SCREENING BILAT WITH TOMO: ICD-10-PCS | Mod: 26,,, | Performed by: RADIOLOGY

## 2023-08-31 PROCEDURE — 1159F PR MEDICATION LIST DOCUMENTED IN MEDICAL RECORD: ICD-10-PCS | Mod: CPTII,S$GLB,, | Performed by: OBSTETRICS & GYNECOLOGY

## 2023-08-31 PROCEDURE — 3078F PR MOST RECENT DIASTOLIC BLOOD PRESSURE < 80 MM HG: ICD-10-PCS | Mod: CPTII,S$GLB,, | Performed by: OBSTETRICS & GYNECOLOGY

## 2023-08-31 PROCEDURE — 77067 SCR MAMMO BI INCL CAD: CPT | Mod: 26,,, | Performed by: RADIOLOGY

## 2023-08-31 PROCEDURE — 3074F SYST BP LT 130 MM HG: CPT | Mod: CPTII,S$GLB,, | Performed by: OBSTETRICS & GYNECOLOGY

## 2023-08-31 PROCEDURE — 99396 PR PREVENTIVE VISIT,EST,40-64: ICD-10-PCS | Mod: S$GLB,,, | Performed by: OBSTETRICS & GYNECOLOGY

## 2023-08-31 PROCEDURE — 77067 SCR MAMMO BI INCL CAD: CPT | Mod: TC

## 2023-08-31 PROCEDURE — 87624 HPV HI-RISK TYP POOLED RSLT: CPT | Performed by: OBSTETRICS & GYNECOLOGY

## 2023-08-31 PROCEDURE — 1160F PR REVIEW ALL MEDS BY PRESCRIBER/CLIN PHARMACIST DOCUMENTED: ICD-10-PCS | Mod: CPTII,S$GLB,, | Performed by: OBSTETRICS & GYNECOLOGY

## 2023-08-31 NOTE — PROGRESS NOTES
Subjective:      Patient ID: Josh Simental is a 48 y.o. female.    Chief Complaint:  Well Woman      History of Present Illness  HPI  Presents for well-woman exam.  No new problems.  Pt is .  Stopped HRT patch due to cramping.  Takes OTC Estroven, and states hot flushes have almost completely resolved.  No vaginal dryness.  Struggling with trying to lose weight and with dry skin.  MM with her .  Last pap: 2020: normal  MM2022: normal  Colon cancer screening up to date    GYN & OB History  No LMP recorded. Patient is premenopausal.   Date of Last Pap: 2020    OB History    Para Term  AB Living   2 2 2 0 0 2   SAB IAB Ectopic Multiple Live Births   0 0 0 0 2      # Outcome Date GA Lbr Camron/2nd Weight Sex Delivery Anes PTL Lv   2 Term      Vag-Spont   YANA   1 Term      Vag-Spont   YANA       Review of Systems  Review of Systems       Objective:     Physical Exam:   Constitutional: She is oriented to person, place, and time. She appears well-developed and well-nourished. No distress.      Neck: No thyromegaly present.     Pulmonary/Chest: Right breast exhibits no inverted nipple, no mass, no nipple discharge, no skin change, no tenderness, no bleeding and no swelling. Left breast exhibits no inverted nipple, no mass, no nipple discharge, no skin change, no tenderness, no bleeding and no swelling. Breasts are symmetrical.        Abdominal: Soft. She exhibits no distension and no mass. There is no abdominal tenderness. There is no rebound and no guarding.     Genitourinary:    Pelvic exam was performed with patient supine.   There is no rash, tenderness, lesion or injury on the right labia. There is no rash, tenderness, lesion or injury on the left labia. Cervix is normal. Right adnexum displays no mass, no tenderness and no fullness. Left adnexum displays no mass, no tenderness and no fullness. No erythema,  no vaginal discharge, tenderness, bleeding, rectocele or cystocele  in the vagina.    No foreign body in the vagina.      No signs of injury in the vagina.   Cervix exhibits no motion tenderness, no discharge and no friability. Uterus is not deviated, not enlarged, not fixed, not tender and not hosting fibroids.               Neurological: She is alert and oriented to person, place, and time.     Psychiatric: She has a normal mood and affect.         Assessment:     1. Well woman exam with routine gynecological exam    2. Breast cancer screening by mammogram    3. Cervical cancer screening               Plan:     Josh was seen today for well woman.    Diagnoses and all orders for this visit:    Well woman exam with routine gynecological exam    Breast cancer screening by mammogram  -     Mammo Digital Screening Bilat w/ Luisito; Future    Cervical cancer screening  -     Liquid-Based Pap Smear, Screening  -     HPV High Risk Genotypes, PCR     Reviewed updated recommendations for pap smears (every 3 years) in low risk patients.   Recommend annual pelvic exams.  Reviewed recommendations for annual CBE and annual MMG.  Discussed typical weight and body composition changes that happen during menopause.  Reviewed diet and lifestyle modifications to mitigate weight gain.  RTC 1 year or sooner prn.

## 2023-09-07 LAB
HPV HR 12 DNA SPEC QL NAA+PROBE: NEGATIVE
HPV16 AG SPEC QL: NEGATIVE
HPV18 DNA SPEC QL NAA+PROBE: NEGATIVE

## 2023-09-10 LAB
FINAL PATHOLOGIC DIAGNOSIS: NORMAL
Lab: NORMAL

## 2023-09-19 ENCOUNTER — PATIENT MESSAGE (OUTPATIENT)
Dept: DERMATOLOGY | Facility: CLINIC | Age: 48
End: 2023-09-19
Payer: COMMERCIAL

## 2023-09-20 ENCOUNTER — OFFICE VISIT (OUTPATIENT)
Dept: ALLERGY | Facility: CLINIC | Age: 48
End: 2023-09-20
Payer: COMMERCIAL

## 2023-09-20 VITALS
SYSTOLIC BLOOD PRESSURE: 105 MMHG | BODY MASS INDEX: 32.74 KG/M2 | HEART RATE: 71 BPM | TEMPERATURE: 98 F | DIASTOLIC BLOOD PRESSURE: 71 MMHG | HEIGHT: 64 IN | WEIGHT: 191.81 LBS

## 2023-09-20 DIAGNOSIS — L20.84 INTRINSIC ATOPIC DERMATITIS: ICD-10-CM

## 2023-09-20 DIAGNOSIS — J30.89 NON-SEASONAL ALLERGIC RHINITIS, UNSPECIFIED TRIGGER: Primary | ICD-10-CM

## 2023-09-20 DIAGNOSIS — J31.0 NON-ALLERGIC RHINITIS: ICD-10-CM

## 2023-09-20 PROCEDURE — 99204 PR OFFICE/OUTPT VISIT, NEW, LEVL IV, 45-59 MIN: ICD-10-PCS | Mod: 25,S$GLB,, | Performed by: ALLERGY & IMMUNOLOGY

## 2023-09-20 PROCEDURE — 99999 PR PBB SHADOW E&M-EST. PATIENT-LVL III: ICD-10-PCS | Mod: PBBFAC,,, | Performed by: ALLERGY & IMMUNOLOGY

## 2023-09-20 PROCEDURE — 95004 PR ALLERGY SKIN TESTS,ALLERGENS: ICD-10-PCS | Mod: S$GLB,,, | Performed by: ALLERGY & IMMUNOLOGY

## 2023-09-20 PROCEDURE — 3078F DIAST BP <80 MM HG: CPT | Mod: CPTII,S$GLB,, | Performed by: ALLERGY & IMMUNOLOGY

## 2023-09-20 PROCEDURE — 99999 PR PBB SHADOW E&M-EST. PATIENT-LVL III: CPT | Mod: PBBFAC,,, | Performed by: ALLERGY & IMMUNOLOGY

## 2023-09-20 PROCEDURE — 3008F PR BODY MASS INDEX (BMI) DOCUMENTED: ICD-10-PCS | Mod: CPTII,S$GLB,, | Performed by: ALLERGY & IMMUNOLOGY

## 2023-09-20 PROCEDURE — 3078F PR MOST RECENT DIASTOLIC BLOOD PRESSURE < 80 MM HG: ICD-10-PCS | Mod: CPTII,S$GLB,, | Performed by: ALLERGY & IMMUNOLOGY

## 2023-09-20 PROCEDURE — 3074F PR MOST RECENT SYSTOLIC BLOOD PRESSURE < 130 MM HG: ICD-10-PCS | Mod: CPTII,S$GLB,, | Performed by: ALLERGY & IMMUNOLOGY

## 2023-09-20 PROCEDURE — 3074F SYST BP LT 130 MM HG: CPT | Mod: CPTII,S$GLB,, | Performed by: ALLERGY & IMMUNOLOGY

## 2023-09-20 PROCEDURE — 1159F MED LIST DOCD IN RCRD: CPT | Mod: CPTII,S$GLB,, | Performed by: ALLERGY & IMMUNOLOGY

## 2023-09-20 PROCEDURE — 3008F BODY MASS INDEX DOCD: CPT | Mod: CPTII,S$GLB,, | Performed by: ALLERGY & IMMUNOLOGY

## 2023-09-20 PROCEDURE — 3044F PR MOST RECENT HEMOGLOBIN A1C LEVEL <7.0%: ICD-10-PCS | Mod: CPTII,S$GLB,, | Performed by: ALLERGY & IMMUNOLOGY

## 2023-09-20 PROCEDURE — 99204 OFFICE O/P NEW MOD 45 MIN: CPT | Mod: 25,S$GLB,, | Performed by: ALLERGY & IMMUNOLOGY

## 2023-09-20 PROCEDURE — 95004 PERQ TESTS W/ALRGNC XTRCS: CPT | Mod: S$GLB,,, | Performed by: ALLERGY & IMMUNOLOGY

## 2023-09-20 PROCEDURE — 3044F HG A1C LEVEL LT 7.0%: CPT | Mod: CPTII,S$GLB,, | Performed by: ALLERGY & IMMUNOLOGY

## 2023-09-20 PROCEDURE — 1159F PR MEDICATION LIST DOCUMENTED IN MEDICAL RECORD: ICD-10-PCS | Mod: CPTII,S$GLB,, | Performed by: ALLERGY & IMMUNOLOGY

## 2023-09-20 RX ORDER — TRIAMCINOLONE ACETONIDE 1 MG/G
OINTMENT TOPICAL 2 TIMES DAILY
Qty: 60 G | Refills: 2 | Status: SHIPPED | OUTPATIENT
Start: 2023-09-20

## 2023-09-20 RX ORDER — IPRATROPIUM BROMIDE 21 UG/1
2 SPRAY, METERED NASAL 3 TIMES DAILY
Qty: 20 ML | Refills: 5 | Status: SHIPPED | OUTPATIENT
Start: 2023-09-20

## 2023-09-20 NOTE — PROGRESS NOTES
"Subjective:      Patient ID: Josh Simental is a 48 y.o. female.      Referred by Dr. Alex Alvarez MD for allergic rhinitis    Chief Complaint:  Allergy Testing      HPI:  48 year old female complaining of "feeling like something is compressing my throat". She tried GERD medications for 6 months, EGD negative. Ultrasound of the neck was negative per her report. Referred to ENT- scope- negative    Cetirizine helped- she stopped for this visit.  Sinus pressure, headache, itchy "all over" - since stopping Cetirizine  "I feel like I might have some fluid in my ears."    Allergy testing 10 years ago- "I tested positive for all of the environmental". She did sublingual drops for a year. She reports relief. She stopped due to price.    She denies asthma.  She denies food allergies.  She denies insect sting allergy.  She thinks " I might have" atopic dermatitis. She reports dry skin with hormonal changes. Itchy "rashes red bumps".  Perfumes cause rashes.    Past Medical History:   Diagnosis Date    Allergy     Anxiety     Depression     Endometriosis     Endometriosis 4/17/2019 10:14:15 AM    [facility] [place] Historical - DO NOT USE: Endometriosis-No Additional Notes    Insulin resistance     Migraines     Other depressive disorder 4/17/2019 10:12:51 AM    [facility] [place] Historical - [facility]: Depression-No Additional Notes    Pure hypercholesterolemia 6/30/2021 9:34:13 AM    [facility] [place] Historical - Endocrine/Metabolic/Immune: Hypercholesterolemia-No Additional Notes    Sinusitis         Family History   Problem Relation Age of Onset    Stroke Paternal Grandmother     Cancer Maternal Grandmother         Cervical     Diabetes Maternal Grandfather     Cancer Maternal Grandfather         Prostate    Diabetes Father     Cancer Father         Renal     Diabetes Mother     Thyroid disease Mother     Breast cancer Other    .    Current Outpatient Medications on File Prior to Visit   Medication Sig Dispense Refill    biotin 1 mg " tablet Take 1,000 mcg by mouth 3 (three) times daily.      calcium citrate-vitamin D3 315-200 mg (CITRACAL+D) 315 mg-5 mcg (200 unit) per tablet Take 1 tablet by mouth 2 (two) times daily.      LACTOBACILLUS ACIDOPHILUS (PROBIOTIC ORAL) Take 1 tablet by mouth once daily.      metFORMIN (GLUCOPHAGE-XR) 500 MG ER 24hr tablet Take 1 tablet (500 mg total) by mouth daily with breakfast. 90 tablet 3    soy isofla-blk cohosh-mag bark (ESTROVEN) 155 mg Cap Take by mouth.      vitamin E 400 UNIT capsule Take 400 Units by mouth once daily.       Current Facility-Administered Medications on File Prior to Visit   Medication Dose Route Frequency Provider Last Rate Last Admin    chlorhexidine 0.12 % solution 10 mL  10 mL Mouth/Throat On Call Procedure Rose Santo PA-C   10 mL at 07/14/22 0655      Review of patient's allergies indicates:   Allergen Reactions    No known drug allergies           Environmental History: Pets in the home: dogs (1).  Tobacco Smoke in Home: no  Review of Systems   HENT:  Positive for congestion and sinus pressure.    Eyes:  Positive for discharge and itching.   Respiratory:  Positive for cough. Negative for shortness of breath and wheezing.    Cardiovascular:  Negative for chest pain and leg swelling.   Gastrointestinal:  Negative for diarrhea and vomiting.   Skin:  Positive for rash. Negative for wound.   Allergic/Immunologic: Positive for environmental allergies. Negative for food allergies and immunocompromised state.   Neurological:  Negative for facial asymmetry and speech difficulty.   Psychiatric/Behavioral:  Negative for behavioral problems and suicidal ideas.        Objective:   Physical Exam  Vitals reviewed.   Constitutional:       General: She is not in acute distress.     Appearance: Normal appearance. She is well-developed. She is not ill-appearing, toxic-appearing or diaphoretic.   HENT:      Head: Normocephalic and atraumatic.      Right Ear: Tympanic membrane, ear canal and external  ear normal. There is no impacted cerumen.      Left Ear: Tympanic membrane, ear canal and external ear normal. There is no impacted cerumen.      Nose: Nose normal. No congestion or rhinorrhea.      Mouth/Throat:      Pharynx: No oropharyngeal exudate or posterior oropharyngeal erythema.   Eyes:      General: No scleral icterus.        Right eye: No discharge.         Left eye: No discharge.      Pupils: Pupils are equal, round, and reactive to light.   Neck:      Thyroid: No thyromegaly.   Cardiovascular:      Rate and Rhythm: Normal rate and regular rhythm.      Heart sounds: Normal heart sounds. No murmur heard.     No friction rub. No gallop.   Pulmonary:      Effort: Pulmonary effort is normal. No respiratory distress.      Breath sounds: Normal breath sounds. No stridor. No wheezing, rhonchi or rales.   Chest:      Chest wall: No tenderness.   Musculoskeletal:         General: No swelling or tenderness. Normal range of motion.      Cervical back: Normal range of motion and neck supple. No rigidity or tenderness. No muscular tenderness.      Left lower leg: No edema.   Lymphadenopathy:      Cervical: No cervical adenopathy.   Skin:     General: Skin is warm.      Coloration: Skin is not jaundiced or pale.      Findings: Rash present. No bruising or erythema.      Comments: Left antecubital fossa- erythematous patch   Neurological:      General: No focal deficit present.      Mental Status: She is alert and oriented to person, place, and time.      Gait: Gait normal.   Psychiatric:         Mood and Affect: Mood normal.         Behavior: Behavior normal.         Thought Content: Thought content normal.         Judgment: Judgment normal.     Allergy Skin Prick testing  Histamien 7x5, 7x2  Saline 2 xw  She had an appropriate response to positive and negative controls.  The following were negative:  Red Maple, Juniper, Pine mix,Cat, Dog, Dust mite(F and P), Cockroach, Alternaria, Aspergillus, Helminthosporium, Bald  [place]  [place], [place], Ligustrum, Oak, Pecan, Red Cedar, [place], [place], [place], Rakesh, Red Top/Agrostis [place], Rye/Lolium, Reece, English Plantain, Marsh Elder, Pigweed, Ragweed, Russian Thistle, Curvularia/Drechsiera Spicifera, Epicoccum, Fusarium, Hormodendrum/Cladosporium, Penicillium, Monilia/candida, Phoma, Stemphylium      Allergy inhalant testing was negative to common inhalants today.  I interpreted the test.    Assessment:      1. Non-seasonal allergic rhinitis, unspecified trigger    2. Intrinsic atopic dermatitis    3. Non-allergic rhinitis        Plan:     Non-seasonal allergic rhinitis, unspecified trigger  -     Ambulatory referral/consult to Allergy  -     ipratropium (ATROVENT) 21 mcg (0.03 %) nasal spray; 2 sprays by Each Nostril route 3 (three) times daily.  Dispense: 20 mL; Refill: 5    Intrinsic atopic dermatitis  -     triamcinolone acetonide 0.1% (KENALOG) 0.1 % ointment; Apply topically 2 (two) times daily.  Dispense: 60 g; Refill: 2    Non-allergic rhinitis  -     ipratropium (ATROVENT) 21 mcg (0.03 %) nasal spray; 2 sprays by Each Nostril route 3 (three) times daily.  Dispense: 20 mL; Refill: 5     Restart Cetirizine  Adding Atrovent nasal spray  Triamcinolone ointment for her arm.    RTC prn     MD,FACAAI                  Problems Address                                                 Amount and/or Complexity                                                                      Risk       3           [] 2 or more self-limited or minor problems                      [] Limited                                                                        [] Low                  [] 1 stable chronic illness                                                  Any combination of the two                                               OTC drugs                  []Acute, uncomplicated illness or injury                            Review of prior external notes from unique source           Minor  surgery with no risk factors                                                                                                               [] 1 []2  []3+                                                                                                              Review of results from each unique test                                                                                                               [] 1 []2  [] 3+                                                                                                              Order of each unique test                                                                                                               [] 1 []2  [] 3+                                                                                                              Or                                                                                                             [] Assessment requiring an independent historian      4            [] One or more chronic illness with exacerbation,              [] Moderate                                                                      [x] Moderate                 Progression, or side effects of treatment                            -test documents or independent historians                        Prescription drug management                [x]  2 or more stable chronic illnesses                                    [x] Independent interpretation of tests                              Minor surgery with identifiable risk                [] 1 undiagnosed new problem with uncertain prognosis    [x] Discussion or management of test results                    elective major surgery                [] 1 acute illness with                systemic symptoms                                                                                                                                                              [] 1 acute complicated injury                                                                                                                                           Elective major surgery                                                                                                                                                                                                                                                                                                                                                                                                  5            [] 1 or more chronic illnesses with severe exacerbation,     [] Extensive(two from below)                                         [] High                                                                                                               [] Independent interpretation of results                         Drug therapy requiring intensive                                                                                                               []Discussion of management or test interpretation           monitoring                                                                                                                                                                                                       Decision to de-escalate care                 [] 1 acute or chronic illness or injury that poses a threat                                                                                               Decision regarding hospitalization                                                                                                                                                                                                            CC: Dr. Alvarez

## 2023-09-21 ENCOUNTER — OFFICE VISIT (OUTPATIENT)
Dept: OTOLARYNGOLOGY | Facility: CLINIC | Age: 48
End: 2023-09-21
Payer: COMMERCIAL

## 2023-09-21 VITALS — BODY MASS INDEX: 32.52 KG/M2 | WEIGHT: 190.5 LBS | TEMPERATURE: 98 F | HEIGHT: 64 IN

## 2023-09-21 DIAGNOSIS — R09.A2 GLOBUS SENSATION: Primary | ICD-10-CM

## 2023-09-21 DIAGNOSIS — R49.0 DYSPHONIA: ICD-10-CM

## 2023-09-21 DIAGNOSIS — J30.89 NON-SEASONAL ALLERGIC RHINITIS, UNSPECIFIED TRIGGER: ICD-10-CM

## 2023-09-21 PROCEDURE — 1159F PR MEDICATION LIST DOCUMENTED IN MEDICAL RECORD: ICD-10-PCS | Mod: CPTII,S$GLB,, | Performed by: OTOLARYNGOLOGY

## 2023-09-21 PROCEDURE — 99999 PR PBB SHADOW E&M-EST. PATIENT-LVL III: CPT | Mod: PBBFAC,,, | Performed by: OTOLARYNGOLOGY

## 2023-09-21 PROCEDURE — 99213 OFFICE O/P EST LOW 20 MIN: CPT | Mod: S$GLB,,, | Performed by: OTOLARYNGOLOGY

## 2023-09-21 PROCEDURE — 3044F HG A1C LEVEL LT 7.0%: CPT | Mod: CPTII,S$GLB,, | Performed by: OTOLARYNGOLOGY

## 2023-09-21 PROCEDURE — 99999 PR PBB SHADOW E&M-EST. PATIENT-LVL III: ICD-10-PCS | Mod: PBBFAC,,, | Performed by: OTOLARYNGOLOGY

## 2023-09-21 PROCEDURE — 3008F PR BODY MASS INDEX (BMI) DOCUMENTED: ICD-10-PCS | Mod: CPTII,S$GLB,, | Performed by: OTOLARYNGOLOGY

## 2023-09-21 PROCEDURE — 99213 PR OFFICE/OUTPT VISIT, EST, LEVL III, 20-29 MIN: ICD-10-PCS | Mod: S$GLB,,, | Performed by: OTOLARYNGOLOGY

## 2023-09-21 PROCEDURE — 1159F MED LIST DOCD IN RCRD: CPT | Mod: CPTII,S$GLB,, | Performed by: OTOLARYNGOLOGY

## 2023-09-21 PROCEDURE — 3044F PR MOST RECENT HEMOGLOBIN A1C LEVEL <7.0%: ICD-10-PCS | Mod: CPTII,S$GLB,, | Performed by: OTOLARYNGOLOGY

## 2023-09-21 PROCEDURE — 3008F BODY MASS INDEX DOCD: CPT | Mod: CPTII,S$GLB,, | Performed by: OTOLARYNGOLOGY

## 2023-09-21 NOTE — PROGRESS NOTES
"Referring Provider:    No referring provider defined for this encounter.  Subjective:   Patient: Josh Simental [id number], :1975   Visit date:2023 8:38 AM    Chief Complaint:  Other (Allergy follow up. Patient c/o itchy throat, weird sensation on throat, nasal congestion, eyes itching, and ears popping)    HPI:    Prior notes reviewed by myself.  Clinical documentation obtained by nursing staff reviewed.     48-year-old female presents for evaluation of globus sensation, throat clearing.  She also reports intermittent hoarseness that is worse first thing in the morning and late in the evening. She has not noticed a definite association with meals.   This has been an issue for over a year.  She has had a GI evaluation including EGD and at least 6 month trial of omeprazole without significant improvement.  She has a history of allergic rhinitis and prior allergy testing which showed that she was allergic to everything outside.  She is not currently taking any allergy medication.  She was on sublingual immunotherapy for a period of time which she felt helped with her symptoms.  She does not have a history of recurrent acute or chronic sinusitis.      Objective:     Physical Exam:  Vitals:  Temp 97.9 °F (36.6 °C) (Temporal)   Ht 5' 4" (1.626 m)   Wt 86.4 kg (190 lb 7.6 oz)   BMI 32.70 kg/m²   General appearance:  Well developed, well nourished    Ears:  Otoscopy of external auditory canals and tympanic membranes was normal, clinical speech reception thresholds grossly intact, no mass/lesion of auricle.    Nose:  No masses/lesions of external nose, congested nasal mucosa, septum, and turbinates were within normal limits. Clear rhinorrhea    Mouth:  No mass/lesion of lips, teeth, gums, hard/soft palate, tongue, tonsils, or oropharynx.    Neck & Lymphatics:  No cervical lymphadenopathy, no neck mass/crepitus/ asymmetry, trachea is midline, no thyroid enlargement/tenderness/mass.    Due to indication " in patient's history, presentation or risk factors,  a fiber optic exam was performed.    SEPARATE PROCEDURE NOTE:    ANESTHESIA:  Topical xylocaine with eriberto-synephrine  FINDINGS:  Normal exam      PROCEDURE:  After verbal consent was obtained, the flexible scope was passed through the patient's nasal cavity without difficulty.  The nasopharynx (adenoid pad) and eustachian tube orifices were first visualized and were found to be normal, without masses or irregularity.  The posterior pharyngeal wall and base of tongue were then examined and no mass or irregular tissue was seen.  The scope was then advanced to the larynx, and the epiglottis, valleculae, and piriform sinuses were normal, without masses or mucosal irregularity.  The false vocal folds and true vocal folds were then examined and were found to have normal mobility (full abduction and adduction) and no masses or mucosal irregularity was seen.  The arytenoids and the interarytenoid area were normal.      [x]  Data Reviewed:    Lab Results   Component Value Date    WBC 4.99 06/22/2023    HGB 12.9 06/22/2023    HCT 41.3 06/22/2023    MCV 94 06/22/2023    EOSINOPHIL 1.8 06/22/2023        US Soft Tissue Head Neck Thyroid  Status: Final result     MyChart Results Release    MyChart Status: Active  Results Release       US Soft Tissue Head Neck Thyroid: Result Notes     Parul Barlow LPN   6/21/2023 12:49 PM CDT       Patient reviewed results on my chart.    Sent a msg to pt on my chart to contact the office if she has any questions  or concerns.     Eileen Yo MD   6/21/2023 11:40 AM CDT       The US of your thyroid shows a few modules. This is likely the cause of the sensation of you are feeling as if there is something in your throat. I recommend thyroid studies and referral to endocrinology. I will communicate with Dr. Issa's office about these requests. No additional GI work up is needed at this time. Take care.             PACS Images for  [facility] [place] Viewer     Show images for US Soft Tissue Head Neck Thyroid  All Reviewers List    Dianna Issa MD on 6/21/2023 14:04   Dianna Issa MD on 6/21/2023 12:05     US Soft Tissue Head Neck Thyroid  Order: [id number]  Status: Final result     Visible to patient: Yes (seen)     Next appt: 09/21/2023 at 08:30 AM in Otolaryngology (Alex Alvarez MD)     Dx: Globus sensation     2 Result Notes    1 Patient Communication  Details    Reading Physician Reading Date Result Priority   SENIA Ness Sr., MD  [phone] 6/21/2023 Routine     Narrative & Impression  EXAMINATION:  US SOFT TISSUE HEAD NECK THYROID     CLINICAL HISTORY:  globus sensation, neg EGD eval for goiter or neck mass;     TECHNIQUE:  Multiple static ultrasound images are submitted for interpretation.     COMPARISON:  None     FINDINGS:  The thyroid is heterogeneous in appearance.  There is a 10 mm slightly hypoechoic nodule in the inferior pole of the left lobe of the thyroid.     The right lobe of the thyroid measures 4.8 cm in craniocaudal dimension by 2.1 cm in AP dimension by 2.0 cm in mediolateral dimension.  The isthmus measures 4 mm in AP dimension.  The left lobe of the thyroid measures 4.8 cm in craniocaudal dimension by 1.8 cm in AP dimension by 2.0 cm in mediolateral dimension.     Impression:     1. There is a 10 mm slightly hypoechoic nodule in the inferior pole of the left lobe of the thyroid.  2. The finding corresponds with a TI-RADS level 4, moderately suspicious.  Based on its size I recommend consideration of a follow-up ultrasound examination in 1 year.  3. The thyroid is heterogeneous in appearance.        Electronically signed by: Ras Ness MD  Date:                                            06/21/2023  Time:                                           08:10           Exam Ended: 06/21/23 07:57 Last Resulted: 06/21/23 08:10       Order Details     View Encounter     Lab and Collection Details      Routing     Result History     View Encounter Conversation           Result Care Coordination      Result Notes     Parul Barlow LPN   6/21/2023 12:49 PM CDT Back to Top      Patient reviewed results on my chart.    Sent a msg to pt on my chart to contact the office if she has any questions  or concerns.     Eileen Yo MD   6/21/2023 11:40 AM CDT       The US of your thyroid shows a few modules. This is likely the cause of the sensation of you are feeling as if there is something in your throat. I recommend thyroid studies and referral to endocrinology. I will communicate with Dr. Issa's office about these requests. No additional GI work up is needed at this time. Take care.           Patient Communication     Append Comments   Seen Back to Top    The US of your thyroid shows a few modules. This is likely the cause of the sensation of you are feeling as if there is something in your throat. I recommend thyroid studies and referral to endocrinology. I will communicate with Dr. Issa's office about these requests. No additional GI work up is needed at this time. Take care. Per Dr Yo.  ...   Written by Parul Barlow LPN on 6/21/2023 12:49 PM CDT View Full Comments  Seen by patient Josh Simental on 6/21/2023  1:02 PM            US Soft Tissue Head Neck Thyroid: Patient Communication     Append Comments   Seen    The US of your thyroid shows a few modules. This is likely the cause of the sensation of you are feeling as if there is something in your throat. I recommend thyroid studies and referral to endocrinology. I will communicate with Dr. Issa's office about these requests. No additional GI work up is needed at this time. Take care. Per Dr Yo.   Please let me know if you need me to help with getting you in contact with Dr Issa's office. Thank you and have a good day   Written by Parul Barlow LPN on 6/21/2023 12:49 PM CDT  Seen by patient Josh Simental on  6/21/2023  1:02 PM    External Result Report    External Result Report     Narrative & Impression    EXAMINATION:  US SOFT TISSUE HEAD NECK THYROID     CLINICAL HISTORY:  globus sensation, neg EGD eval for goiter or neck mass;     TECHNIQUE:  Multiple static ultrasound images are submitted for interpretation.     COMPARISON:  None     FINDINGS:  The thyroid is heterogeneous in appearance.  There is a 10 mm slightly hypoechoic nodule in the inferior pole of the left lobe of the thyroid.     The right lobe of the thyroid measures 4.8 cm in craniocaudal dimension by 2.1 cm in AP dimension by 2.0 cm in mediolateral dimension.  The isthmus measures 4 mm in AP dimension.  The left lobe of the thyroid measures 4.8 cm in craniocaudal dimension by 1.8 cm in AP dimension by 2.0 cm in mediolateral dimension.     Impression:     1. There is a 10 mm slightly hypoechoic nodule in the inferior pole of the left lobe of the thyroid.  2. The finding corresponds with a TI-RADS level 4, moderately suspicious.  Based on its size I recommend consideration of a follow-up ultrasound examination in 1 year.  3. The thyroid is heterogeneous in appearance.        Electronically signed by: Ras Ness MD  Date:                                            06/21/2023  Time:                                           08:10    Encounter    View Encounter             Signed by    Signed Time Phone Pager   SENIA Ness Sr., MD 6/21/2023 08:10 [phone]      Reviewed By    Dianna Issa MD on 6/21/2023 14:04   Dianna Issa MD on 6/21/2023 12:05           Exam Details    Performed Procedure Technologist Supporting Staff Performing Physician   US Soft Tissue Head Neck Thyroid Ying Agrawal, RT           Appointment Date/Status Modality Department    6/21/2023     Completed [facility] US1 [facility] ULTRASOUND           Begin Exam End Exam Begin Exam Questionnaires End Exam Questionnaires   6/21/2023  7:54 AM 6/21/2023  7:57 AM [facility] PREGNANCY  TECH NAVIGATOR IMAGING END ALL              Reason for Exam  Priority: Routine  globus sensation, neg EGD eval for goiter or neck mass   Dx: Globus sensation [R09.89 (ICD-10-CM)]         Order Report     Order Details        Reviewed EGD report  Reviewed notes and negative allergy testing from Dr. Rossi    Assessment & Plan:   Globus sensation    Non-seasonal allergic rhinitis, unspecified trigger    Dysphonia [R49.0 (ICD-10-CM)]          We reviewed her history, exam, endoscopy findings.  I have also reviewed her EGD report and ultrasound of her thyroid.  Her EGD was fairly unremarkable and she has not responded to 6 months or more of proton pump inhibitors.  Her thyroid ultrasound also did not demonstrate findings that would explain her symptoms.  She does have a history of severe environmental allergies, so I recommended that we revisit that and repeat allergy testing.    9/21/23 update:  I reviewed the notes from Dr. Rossi as well as her negative allergy testing.  Dr. Rossi has recommended starting Atrovent nasal spray.  She will continue to follow up.

## 2024-01-09 ENCOUNTER — OFFICE VISIT (OUTPATIENT)
Dept: OTOLARYNGOLOGY | Facility: CLINIC | Age: 49
End: 2024-01-09
Payer: COMMERCIAL

## 2024-01-09 ENCOUNTER — TELEPHONE (OUTPATIENT)
Dept: OTOLARYNGOLOGY | Facility: CLINIC | Age: 49
End: 2024-01-09
Payer: COMMERCIAL

## 2024-01-09 VITALS — HEIGHT: 64 IN | WEIGHT: 200.38 LBS | BODY MASS INDEX: 34.21 KG/M2 | TEMPERATURE: 99 F

## 2024-01-09 DIAGNOSIS — R09.A2 GLOBUS SENSATION: Primary | ICD-10-CM

## 2024-01-09 DIAGNOSIS — J30.89 NON-SEASONAL ALLERGIC RHINITIS, UNSPECIFIED TRIGGER: ICD-10-CM

## 2024-01-09 PROCEDURE — 99999 PR PBB SHADOW E&M-EST. PATIENT-LVL III: CPT | Mod: PBBFAC,,, | Performed by: OTOLARYNGOLOGY

## 2024-01-09 PROCEDURE — 99213 OFFICE O/P EST LOW 20 MIN: CPT | Mod: S$GLB,,, | Performed by: OTOLARYNGOLOGY

## 2024-01-09 PROCEDURE — 3008F BODY MASS INDEX DOCD: CPT | Mod: CPTII,S$GLB,, | Performed by: OTOLARYNGOLOGY

## 2024-01-09 PROCEDURE — 1159F MED LIST DOCD IN RCRD: CPT | Mod: CPTII,S$GLB,, | Performed by: OTOLARYNGOLOGY

## 2024-01-09 PROCEDURE — 1160F RVW MEDS BY RX/DR IN RCRD: CPT | Mod: CPTII,S$GLB,, | Performed by: OTOLARYNGOLOGY

## 2024-01-09 RX ORDER — LEVOCETIRIZINE DIHYDROCHLORIDE 5 MG/1
5 TABLET, FILM COATED ORAL NIGHTLY
COMMUNITY

## 2024-01-09 NOTE — PROGRESS NOTES
"Referring Provider:    No referring provider defined for this encounter.  Subjective:   Patient: Josh Simental [id number], :1975   Visit date:2024 8:38 AM    Chief Complaint:  f/u globus sensation (Pt states she is congested which exacerbates pressure feeling in throat. C/o left ear pain.)    HPI:    Prior notes reviewed by myself.  Clinical documentation obtained by nursing staff reviewed.     48-year-old female presents for evaluation of globus sensation, throat clearing.  She also reports intermittent hoarseness that is worse first thing in the morning and late in the evening. She has not noticed a definite association with meals.   This has been an issue for over a year.  She has had a GI evaluation including EGD and at least 6 month trial of omeprazole without significant improvement.  She has a history of allergic rhinitis and prior allergy testing which showed that she was allergic to everything outside.  She is not currently taking any allergy medication.  She was on sublingual immunotherapy for a period of time which she felt helped with her symptoms.  She does not have a history of recurrent acute or chronic sinusitis.    24 update:  She has been having some issues with one of her left maxillary teeth and is scheduled to see an endodontist next week.  Her symptoms are relatively stable on atrovent      Objective:     Physical Exam:  Vitals:  Temp 98.5 °F (36.9 °C) (Temporal)   Ht 5' 4" (1.626 m)   Wt 90.9 kg (200 lb 6.4 oz)   BMI 34.40 kg/m²   General appearance:  Well developed, well nourished    Ears:  Otoscopy of external auditory canals and tympanic membranes was normal, clinical speech reception thresholds grossly intact, no mass/lesion of auricle.    Nose:  No masses/lesions of external nose, congested nasal mucosa, septum, and turbinates were within normal limits. Clear rhinorrhea    Mouth:  No mass/lesion of lips, teeth, gums, hard/soft palate, tongue, tonsils, or " oropharynx.    Neck & Lymphatics:  No cervical lymphadenopathy, no neck mass/crepitus/ asymmetry, trachea is midline, no thyroid enlargement/tenderness/mass.    Due to indication in patient's history, presentation or risk factors,  a fiber optic exam was performed.    SEPARATE PROCEDURE NOTE:    ANESTHESIA:  Topical xylocaine with eriberto-synephrine  FINDINGS:  Normal exam      PROCEDURE:  After verbal consent was obtained, the flexible scope was passed through the patient's nasal cavity without difficulty.  The nasopharynx (adenoid pad) and eustachian tube orifices were first visualized and were found to be normal, without masses or irregularity.  The posterior pharyngeal wall and base of tongue were then examined and no mass or irregular tissue was seen.  The scope was then advanced to the larynx, and the epiglottis, valleculae, and piriform sinuses were normal, without masses or mucosal irregularity.  The false vocal folds and true vocal folds were then examined and were found to have normal mobility (full abduction and adduction) and no masses or mucosal irregularity was seen.  The arytenoids and the interarytenoid area were normal.      [x]  Data Reviewed:    Lab Results   Component Value Date    WBC 4.99 06/22/2023    HGB 12.9 06/22/2023    HCT 41.3 06/22/2023    MCV 94 06/22/2023    EOSINOPHIL 1.8 06/22/2023        US Soft Tissue Head Neck Thyroid  Status: Final result     MyChart Results Release    MyChart Status: Active  Results Release       US Soft Tissue Head Neck Thyroid: Result Notes     Parul Barlow LPN   6/21/2023 12:49 PM CDT       Patient reviewed results on my chart.    Sent a msg to pt on my chart to contact the office if she has any questions  or concerns.     Eileen Yo MD   6/21/2023 11:40 AM CDT       The US of your thyroid shows a few modules. This is likely the cause of the sensation of you are feeling as if there is something in your throat. I recommend thyroid studies and  referral to endocrinology. I will communicate with Dr. Issa's office about these requests. No additional GI work up is needed at this time. Take care.             PACS Images for [facility] Viewer     Show images for US Soft Tissue Head Neck Thyroid  All Reviewers List    Dianna Issa MD on 6/21/2023 14:04   Dianna Issa MD on 6/21/2023 12:05     US Soft Tissue Head Neck Thyroid  Order: [id number]  Status: Final result     Visible to patient: Yes (seen)     Next appt: 09/21/2023 at 08:30 AM in Otolaryngology (Alex Alvarez MD)     Dx: Globus sensation     2 Result Notes    1 Patient Communication  Details    Reading Physician Reading Date Result Priority   SENIA Ness Sr., MD  [phone] 6/21/2023 Routine     Narrative & Impression  EXAMINATION:  US SOFT TISSUE HEAD NECK THYROID     CLINICAL HISTORY:  globus sensation, neg EGD eval for goiter or neck mass;     TECHNIQUE:  Multiple static ultrasound images are submitted for interpretation.     COMPARISON:  None     FINDINGS:  The thyroid is heterogeneous in appearance.  There is a 10 mm slightly hypoechoic nodule in the inferior pole of the left lobe of the thyroid.     The right lobe of the thyroid measures 4.8 cm in craniocaudal dimension by 2.1 cm in AP dimension by 2.0 cm in mediolateral dimension.  The isthmus measures 4 mm in AP dimension.  The left lobe of the thyroid measures 4.8 cm in craniocaudal dimension by 1.8 cm in AP dimension by 2.0 cm in mediolateral dimension.     Impression:     1. There is a 10 mm slightly hypoechoic nodule in the inferior pole of the left lobe of the thyroid.  2. The finding corresponds with a TI-RADS level 4, moderately suspicious.  Based on its size I recommend consideration of a follow-up ultrasound examination in 1 year.  3. The thyroid is heterogeneous in appearance.        Electronically signed by: Ras Ness MD  Date:                                            06/21/2023  Time:                                            08:10           Exam Ended: 06/21/23 07:57 Last Resulted: 06/21/23 08:10       Order Details     View Encounter     Lab and Collection Details     Routing     Result History     View Encounter Conversation           Result Care Coordination      Result Notes     Parul Barlow LPN   6/21/2023 12:49 PM CDT Back to Top      Patient reviewed results on my chart.    Sent a msg to pt on my chart to contact the office if she has any questions  or concerns.     Eileen Yo MD   6/21/2023 11:40 AM CDT       The US of your thyroid shows a few modules. This is likely the cause of the sensation of you are feeling as if there is something in your throat. I recommend thyroid studies and referral to endocrinology. I will communicate with Dr. Issa's office about these requests. No additional GI work up is needed at this time. Take care.           Patient Communication     Append Comments   Seen Back to Top    The US of your thyroid shows a few modules. This is likely the cause of the sensation of you are feeling as if there is something in your throat. I recommend thyroid studies and referral to endocrinology. I will communicate with Dr. Issa's office about these requests. No additional GI work up is needed at this time. Take care. Per Dr Yo.  ...   Written by Parul Barlow LPN on 6/21/2023 12:49 PM CDT View Full Comments  Seen by patient Josh Simental on 6/21/2023  1:02 PM            US Soft Tissue Head Neck Thyroid: Patient Communication     Append Comments   Seen    The US of your thyroid shows a few modules. This is likely the cause of the sensation of you are feeling as if there is something in your throat. I recommend thyroid studies and referral to endocrinology. I will communicate with Dr. Issa's office about these requests. No additional GI work up is needed at this time. Take care. Per Dr Yo.   Please let me know if you need me to help with  getting you in contact with Dr Issa's office. Thank you and have a good day   Written by Parul Barlow LPN on 6/21/2023 12:49 PM CDT  Seen by patient Josh Simental on 6/21/2023  1:02 PM    External Result Report    External Result Report     Narrative & Impression    EXAMINATION:  US SOFT TISSUE HEAD NECK THYROID     CLINICAL HISTORY:  globus sensation, neg EGD eval for goiter or neck mass;     TECHNIQUE:  Multiple static ultrasound images are submitted for interpretation.     COMPARISON:  None     FINDINGS:  The thyroid is heterogeneous in appearance.  There is a 10 mm slightly hypoechoic nodule in the inferior pole of the left lobe of the thyroid.     The right lobe of the thyroid measures 4.8 cm in craniocaudal dimension by 2.1 cm in AP dimension by 2.0 cm in mediolateral dimension.  The isthmus measures 4 mm in AP dimension.  The left lobe of the thyroid measures 4.8 cm in craniocaudal dimension by 1.8 cm in AP dimension by 2.0 cm in mediolateral dimension.     Impression:     1. There is a 10 mm slightly hypoechoic nodule in the inferior pole of the left lobe of the thyroid.  2. The finding corresponds with a TI-RADS level 4, moderately suspicious.  Based on its size I recommend consideration of a follow-up ultrasound examination in 1 year.  3. The thyroid is heterogeneous in appearance.        Electronically signed by: Ras Ness MD  Date:                                            06/21/2023  Time:                                           08:10    Encounter    View Encounter             Signed by    Signed Time Phone Pager   SENIA Ness Sr., MD 6/21/2023 08:10 [phone]      Reviewed By    Dianna Issa MD on 6/21/2023 14:04   Dianna Issa MD on 6/21/2023 12:05           Exam Details    Performed Procedure Technologist Supporting Staff Performing Physician   US Soft Tissue Head Neck Thyroid RT Oj           Appointment Date/Status Modality Department     6/21/2023     Completed [facility] US1 [facility] ULTRASOUND           Begin Exam End Exam Begin Exam Questionnaires End Exam Questionnaires   6/21/2023  7:54 AM 6/21/2023  7:57 AM RIS PREGNANCY TECH NAVIGATOR IMAGING END ALL              Reason for Exam  Priority: Routine  globus sensation, neg EGD eval for goiter or neck mass   Dx: Globus sensation [R09.89 (ICD-10-CM)]         Order Report     Order Details        Reviewed EGD report  Reviewed notes and negative allergy testing from Dr. Rossi    Assessment & Plan:   Globus sensation    Non-seasonal allergic rhinitis, unspecified trigger          We reviewed her history, exam, endoscopy findings.  I have also reviewed her EGD report and ultrasound of her thyroid.  Her EGD was fairly unremarkable and she has not responded to 6 months or more of proton pump inhibitors.  Her thyroid ultrasound also did not demonstrate findings that would explain her symptoms.  She does have a history of severe environmental allergies, so I recommended that we revisit that and repeat allergy testing.    9/21/23 update:  I reviewed the notes from Dr. Rossi as well as her negative allergy testing.  Dr. Rossi has recommended starting Atrovent nasal spray.  She will continue to follow up.      1/9/24 update:  Her symptoms have been improved with atrovent nasal spray.  She has an upcoming surveillance u/s for her thyroid nodule.      Dr. Alvarez's Eustachian Tube Dysfunction Protocol          Day 1-3 (Perform 2x per day)     Afrin (pump spray mist OTC) 2 sprays in each nostril   Fluticasone nasal spray 2 sprays in each nostril      Days 4 - follow up visit (perform 1x per day)    Fluticasone nasal spray 2 sprays in each nostril    *Buy the OTC Afrin pump spray mist - it should be in the allergy/cold/sinus section of the pharmacy  *Autoinsufflate (POP your ears gently!) at least 4 times per day until your follow up appointment    *TAKE ALL OTHER MEDICATIONS AS DIRECTED BY DR ALVAREZ    *MAKE SURE YOU STOP  USING AFRIN AFTER THE 3RD DAY AS THERE IS A RISK OF BECOMING DEPENDENT ON THAT MEDICATION

## 2024-02-15 ENCOUNTER — OFFICE VISIT (OUTPATIENT)
Dept: PODIATRY | Facility: CLINIC | Age: 49
End: 2024-02-15
Payer: COMMERCIAL

## 2024-02-15 DIAGNOSIS — M25.871 SESAMOIDITIS OF RIGHT FOOT: Primary | ICD-10-CM

## 2024-02-15 PROCEDURE — 99999 PR PBB SHADOW E&M-EST. PATIENT-LVL III: CPT | Mod: PBBFAC,,, | Performed by: PODIATRIST

## 2024-02-15 PROCEDURE — 1159F MED LIST DOCD IN RCRD: CPT | Mod: CPTII,S$GLB,, | Performed by: PODIATRIST

## 2024-02-15 PROCEDURE — 99203 OFFICE O/P NEW LOW 30 MIN: CPT | Mod: S$GLB,,, | Performed by: PODIATRIST

## 2024-02-15 PROCEDURE — 1160F RVW MEDS BY RX/DR IN RCRD: CPT | Mod: CPTII,S$GLB,, | Performed by: PODIATRIST

## 2024-02-15 NOTE — PROGRESS NOTES
Subjective:       Patient ID: Josh Simental is a 48 y.o. female.    Chief Complaint: Toe Pain (Patient complains of 5/10 pain at present to left plantar hallux. She states it feels like a bruise and is worse when ambulating or wearing certain shoes. Also at times the pain radiates to the arch of the foot. She denies injury,. )      HPI: Josh Simental presents to the office today with complaints of Mild to moderate pains to the left foot at the great toe.  Reports that flexible athletic style shoes do worsened her pain, however she does considerably better with flat stiff shoes.  She reports it feels as if she is walking on a bruise that is not considerably better.  Pain sometimes radiates.  Able to flex and extend the toe with little discomfort while sitting.  Has been present for a proximally 8 months.  Presents to clinic today with daughter present.  Patient's Primary Care Provider is Dianna Issa MD.     Review of patient's allergies indicates:   Allergen Reactions    No known drug allergies        Past Medical History:   Diagnosis Date    Allergy     Anxiety     Depression     Endometriosis     Endometriosis 4/17/2019 10:14:15 AM    [facility] Historical - DO NOT USE: Endometriosis-No Additional Notes    Insulin resistance     Migraines     Other depressive disorder 4/17/2019 10:12:51 AM    [facility] - [facility]: Depression-No Additional Notes    Pure hypercholesterolemia 6/30/2021 9:34:13 AM    [facility] Historical - Endocrine/Metabolic/Immune: Hypercholesterolemia-No Additional Notes    Sinusitis        Family History   Problem Relation Age of Onset    Stroke Paternal Grandmother     Cancer Maternal Grandmother         Cervical     Diabetes Maternal Grandfather     Cancer Maternal Grandfather         Prostate    Diabetes Father     Cancer Father         Renal     Diabetes Mother     Thyroid disease Mother     Breast cancer Other        Social History     Socioeconomic  History    Marital status:    Tobacco Use    Smoking status: Former     Current packs/day: 0.00     Average packs/day: 0.3 packs/day for 2.0 years (0.5 ttl pk-yrs)     Types: Cigarettes     Start date: 1997     Quit date: 1999     Years since quittin.6    Smokeless tobacco: Never    Tobacco comments:     Does not smoke   Substance and Sexual Activity    Alcohol use: Yes     Comment: rarely    Drug use: No    Sexual activity: Yes     Partners: Male     Birth control/protection: None     Social Determinants of Health     Financial Resource Strain: Low Risk  (2023)    Overall Financial Resource Strain (CARDIA)     Difficulty of Paying Living Expenses: Not hard at all   Food Insecurity: No Food Insecurity (2023)    Hunger Vital Sign     Worried About Running Out of Food in the Last Year: Never true     Ran Out of Food in the Last Year: Never true   Transportation Needs: No Transportation Needs (2023)    PRAPARE - Transportation     Lack of Transportation (Medical): No     Lack of Transportation (Non-Medical): No   Physical Activity: Insufficiently Active (2023)    Exercise Vital Sign     Days of Exercise per Week: 3 days     Minutes of Exercise per Session: 30 min   Stress: No Stress Concern Present (2023)    [place] [employer] - Occupational Stress Questionnaire     Feeling of Stress : Only a little   Recent Concern: Stress - Stress Concern Present (2023)    [place] [employer] - Occupational Stress Questionnaire     Feeling of Stress : To some extent   Social Connections: Unknown (2023)    Social Connection and Isolation Panel [NHANES]     Frequency of Communication with Friends and Family: More than three times a week     Frequency of Social Gatherings with Friends and Family: Once a week     Active Member of Clubs or Organizations: Yes     Attends Club or Organization Meetings: More than 4 times per year     Marital  Status:    Housing Stability: Low Risk  (6/28/2023)    Housing Stability Vital Sign     Unable to Pay for Housing in the Last Year: No     Number of Places Lived in the Last Year: 1     Unstable Housing in the Last Year: No       Past Surgical History:   Procedure Laterality Date    CARPAL TUNNEL RELEASE Left 07/14/2022    Procedure: RELEASE, CARPAL TUNNEL;  Surgeon: Daryl Glover MD;  Location: [facility];  Service: Orthopedics;  Laterality: Left;    CHOLECYSTECTOMY  07/2017    ESOPHAGOGASTRODUODENOSCOPY N/A 6/16/2023    Procedure: EGD (ESOPHAGOGASTRODUODENOSCOPY);  Surgeon: Eileen Yo MD;  Location: [facility];  Service: Endoscopy;  Laterality: N/A;    EYE SURGERY      LASIK    GALLBLADDER SURGERY  07/2017    hysteroscope      LAPAROSCOPY  1994    dx 2/2 endometriosis    TUBAL LIGATION         Review of Systems      Objective:   There were no vitals taken for this visit.    Mammo Digital Screening Bilat w/ Luisito  Narrative: Result:  Mammo Digital Screening Bilat w/ Luisito    History:  Patient is 48 y.o. and is seen for a screening mammogram.    Films Compared:  Compared to: 08/30/2022 Mammo Digital Screening Bilat w/ Luisito, 06/30/2021   Mammo Previous, 06/29/2020 Mammo Previous, and 04/17/2019 Mammo Previous     Findings:   This procedure was performed using tomosynthesis.   Computer-aided detection was utilized in the interpretation of this   examination.    The breasts have scattered areas of fibroglandular density. There is no   evidence of suspicious masses, microcalcifications or architectural   distortion.  Impression:    No mammographic evidence of malignancy.    BI-RADS Category 1: Negative    Recommendation:  Routine screening mammogram in 1 year is recommended.    Your estimated lifetime risk of breast cancer (to age 85) based on   Tyrer-Cuzick risk assessment model is 6.27 %.  According to the American   Cancer Society, patients with a lifetime breast cancer risk of 20% or   higher might  benefit from supplemental screening tests.      Physical Exam  LOWER EXTREMITY PHYSICAL EXAMINATION    NEUROLOGY: Protective sensation is intact via 5.07 [place] Jon monofilament. Proprioception is intact. Sensation to light touch is intact.    VASCULAR:  The right dorsalis pedis pulse 2/4 and the right posterior tibial pulse 2/4.  The left dorsalis pedis pulse 2/4 and posterior tibial pulse on the left is 2/4.  Capillary refill is intact.  Pedal hair growth intact    ORTHOPEDIC:  No pain with range of motion of the 1st metatarsophalangeal joint.  No evidence of spurring or degenerative joint disease.  Full range of motion is noted.  There is pain on palpation the inferior aspect of the joint at the tibial sesamoid.  Mild discomfort on palpation of the fibular sesamoid.  There is no crepitus with palpation of the sesamoids and range of motion of the 1st metatarsophalangeal joint.    DERMATOLOGY: Skin is supple, dry and intact. No hypertrophic skin formation is noted. No overt breaks in the skin is noted.       Assessment:     1. Sesamoiditis of right foot          Plan:     Sesamoiditis of right foot      Thorough discussion is had with the patient today, concerning the diagnosis, its etiology, and the treatment algorithm at present.    Discussed with patient that pain is likely associated with sesamoiditis considering the location and clinical evaluation.  Recommend patient consider ibuprofen/Tylenol for pain control.    We discuss turf toe inserts which are flat and may from carbon fiber that provide very little flexibility.  These can be transferred to multiple shoe gears.  I would recommend patient consider purchasing on [employer] or over-the-counter and utilize shoe gear.  This should help provide some pain relief with range of motion and push off.        Future Appointments   Date Time Provider Department Center   9/3/2024  9:00 AM Katherine Davis MD [facility]

## 2024-03-07 ENCOUNTER — OFFICE VISIT (OUTPATIENT)
Dept: INTERNAL MEDICINE | Facility: CLINIC | Age: 49
End: 2024-03-07
Payer: COMMERCIAL

## 2024-03-07 VITALS
OXYGEN SATURATION: 99 % | DIASTOLIC BLOOD PRESSURE: 88 MMHG | HEART RATE: 58 BPM | HEIGHT: 65 IN | SYSTOLIC BLOOD PRESSURE: 110 MMHG | WEIGHT: 202.19 LBS | TEMPERATURE: 98 F | RESPIRATION RATE: 20 BRPM | BODY MASS INDEX: 33.69 KG/M2

## 2024-03-07 DIAGNOSIS — E88.819 INSULIN RESISTANCE: ICD-10-CM

## 2024-03-07 DIAGNOSIS — R20.9 COLD EXTREMITIES: ICD-10-CM

## 2024-03-07 DIAGNOSIS — M25.541 PAIN IN JOINTS OF RIGHT HAND: Primary | ICD-10-CM

## 2024-03-07 PROCEDURE — 3074F SYST BP LT 130 MM HG: CPT | Mod: CPTII,S$GLB,, | Performed by: FAMILY MEDICINE

## 2024-03-07 PROCEDURE — 99999 PR PBB SHADOW E&M-EST. PATIENT-LVL III: CPT | Mod: PBBFAC,,, | Performed by: FAMILY MEDICINE

## 2024-03-07 PROCEDURE — 99213 OFFICE O/P EST LOW 20 MIN: CPT | Mod: S$GLB,,, | Performed by: FAMILY MEDICINE

## 2024-03-07 PROCEDURE — 3008F BODY MASS INDEX DOCD: CPT | Mod: CPTII,S$GLB,, | Performed by: FAMILY MEDICINE

## 2024-03-07 PROCEDURE — 3079F DIAST BP 80-89 MM HG: CPT | Mod: CPTII,S$GLB,, | Performed by: FAMILY MEDICINE

## 2024-03-08 NOTE — PROGRESS NOTES
Subjective:      Patient ID: Josh Simental is a 48 y.o. female.    Chief Complaint: No chief complaint on file.      Patient reports recently noting that her right leg is always cool to touch, left leg less so.  No pain in the area.  Reports frequently having pain and swelling in right hand, she is right-hand dominant, no history of trauma or injury to the area  She does report difficulty losing weight, has been gaining, eating very low calorie diet without any results.  She does have insulin resistance for which she is on metformin.      Review of Systems   Constitutional:  Negative for activity change and unexpected weight change.   HENT:  Negative for hearing loss, rhinorrhea and trouble swallowing.    Eyes:  Positive for visual disturbance. Negative for discharge.   Respiratory:  Negative for chest tightness and wheezing.    Cardiovascular:  Negative for chest pain and palpitations.   Gastrointestinal:  Negative for blood in stool, constipation, diarrhea and vomiting.   Endocrine: Negative for polydipsia and polyuria.   Genitourinary:  Negative for difficulty urinating, dysuria, hematuria and menstrual problem.   Musculoskeletal:  Positive for arthralgias and joint swelling. Negative for neck pain.   Neurological:  Positive for headaches. Negative for weakness.   Psychiatric/Behavioral:  Negative for confusion and dysphoric mood.      Past Medical History:   Diagnosis Date    Allergy     Anxiety     Depression     Endometriosis     Endometriosis 4/17/2019 10:14:15 AM    [employer] Historical - DO NOT USE: Endometriosis-No Additional Notes    Insulin resistance     Migraines     Other depressive disorder 4/17/2019 10:12:51 AM    [employer] Historical - [facility]: Depression-No Additional Notes    Pure hypercholesterolemia 6/30/2021 9:34:13 AM    [facility] [employer] Historical - Endocrine/Metabolic/Immune: Hypercholesterolemia-No Additional Notes    Sinusitis           Past Surgical History:   Procedure Laterality  Date    CARPAL TUNNEL RELEASE Left 2022    Procedure: RELEASE, CARPAL TUNNEL;  Surgeon: Daryl Glover MD;  Location: [facility] OR;  Service: Orthopedics;  Laterality: Left;    CHOLECYSTECTOMY  2017    ESOPHAGOGASTRODUODENOSCOPY N/A 2023    Procedure: EGD (ESOPHAGOGASTRODUODENOSCOPY);  Surgeon: Eileen Yo MD;  Location: [facility];  Service: Endoscopy;  Laterality: N/A;    EYE SURGERY      LASIK    GALLBLADDER SURGERY  2017    hysteroscope      LAPAROSCOPY      dx 2/2 endometriosis    TUBAL LIGATION       Family History   Problem Relation Age of Onset    Stroke Paternal Grandmother     Cancer Maternal Grandmother         Cervical     Diabetes Maternal Grandfather     Cancer Maternal Grandfather         Prostate    Diabetes Father     Cancer Father         Renal     Diabetes Mother     Thyroid disease Mother     Breast cancer Other      Social History     Socioeconomic History    Marital status:    Tobacco Use    Smoking status: Former     Current packs/day: 0.00     Average packs/day: 0.3 packs/day for 2.0 years (0.5 ttl pk-yrs)     Types: Cigarettes     Start date: 1997     Quit date: 1999     Years since quittin.7    Smokeless tobacco: Never    Tobacco comments:     Does not smoke   Substance and Sexual Activity    Alcohol use: Yes     Comment: rarely    Drug use: No    Sexual activity: Yes     Partners: Male     Birth control/protection: None     Social Determinants of Health     Financial Resource Strain: Low Risk  (3/6/2024)    Overall Financial Resource Strain (CARDIA)     Difficulty of Paying Living Expenses: Not hard at all   Food Insecurity: No Food Insecurity (3/6/2024)    Hunger Vital Sign     Worried About Running Out of Food in the Last Year: Never true     Ran Out of Food in the Last Year: Never true   Transportation Needs: No Transportation Needs (3/6/2024)    PRAPARE - Transportation     Lack of Transportation (Medical): No     Lack of  "Transportation (Non-Medical): No   Physical Activity: Insufficiently Active (3/6/2024)    Exercise Vital Sign     Days of Exercise per Week: 3 days     Minutes of Exercise per Session: 20 min   Stress: Stress Concern Present (3/6/2024)    [employer] - Occupational Stress Questionnaire     Feeling of Stress : To some extent   Social Connections: Unknown (3/6/2024)    Social Connection and Isolation Panel [NHANES]     Frequency of Communication with Friends and Family: Three times a week     Frequency of Social Gatherings with Friends and Family: Once a week     Active Member of Clubs or Organizations: Yes     Attends Club or Organization Meetings: More than 4 times per year     Marital Status:    Housing Stability: Low Risk  (3/6/2024)    Housing Stability Vital Sign     Unable to Pay for Housing in the Last Year: No     Number of Places Lived in the Last Year: 1     Unstable Housing in the Last Year: No     Review of patient's allergies indicates:   Allergen Reactions    No known drug allergies        Objective:       /88 (BP Location: Left arm, Patient Position: Sitting, BP Method: Large (Manual))   Pulse (!) 58   Temp 97.6 °F (36.4 °C) (Tympanic)   Resp 20   Ht 5' 5" (1.651 m)   Wt 91.7 kg (202 lb 2.6 oz)   SpO2 99%   BMI 33.64 kg/m²   Physical Exam  Vitals reviewed.   Constitutional:       General: She is not in acute distress.     Appearance: Normal appearance. She is well-developed. She is not ill-appearing or diaphoretic.   HENT:      Head: Normocephalic and atraumatic.      Right Ear: Hearing, tympanic membrane, ear canal and external ear normal.      Left Ear: Hearing, tympanic membrane, ear canal and external ear normal.      Nose: Nose normal.      Mouth/Throat:      Pharynx: Uvula midline. No oropharyngeal exudate.   Eyes:      Conjunctiva/sclera: Conjunctivae normal.      Pupils: Pupils are equal, round, and reactive to light.   Neck:      Thyroid: No " thyromegaly.      Trachea: No tracheal deviation.   Cardiovascular:      Rate and Rhythm: Normal rate and regular rhythm.      Heart sounds: Normal heart sounds. No murmur heard.  Pulmonary:      Effort: Pulmonary effort is normal. No respiratory distress.      Breath sounds: Normal breath sounds.   Abdominal:      General: Bowel sounds are normal.      Palpations: Abdomen is soft.      Tenderness: There is no abdominal tenderness. There is no guarding.      Hernia: No hernia is present.   Musculoskeletal:         General: Normal range of motion.      Right lower leg: No edema.      Left lower leg: No edema.      Comments: Pulses in bilateral feet palpable and equal  Hands also slightly cool to touch   Skin:     General: Skin is warm and dry.      Capillary Refill: Capillary refill takes less than 2 seconds.   Neurological:      General: No focal deficit present.      Mental Status: She is alert and oriented to person, place, and time.   Psychiatric:         Mood and Affect: Mood normal.         Behavior: Behavior normal.         Thought Content: Thought content normal.         Judgment: Judgment normal.       Assessment:     1. Pain in joints of right hand    2. BMI 33.0-33.9,adult    3. Insulin resistance    4. Cold extremities      Plan:   Pain in joints of right hand    -     X-Ray Hand Complete Right; Future; Expected date: 03/08/2024    BMI 33.0-33.9,adult    Insulin resistance    Discussed dietary changes for weight loss   Will return for x-ray of hand when available  Discussed could potentially do ultrasound of arteries of legs however do not see any other signs of PAD, she will monitor this for now  Medication List with Changes/Refills   Current Medications    BIOTIN 1 MG TABLET    Take 1,000 mcg by mouth 3 (three) times daily.    CALCIUM CITRATE-VITAMIN D3 315-200 MG (CITRACAL+D) 315 MG-5 MCG (200 UNIT) PER TABLET    Take 1 tablet by mouth 2 (two) times daily.    IPRATROPIUM (ATROVENT) 21 MCG (0.03 %)  NASAL SPRAY    2 sprays by Each Nostril route 3 (three) times daily.    LACTOBACILLUS ACIDOPHILUS (PROBIOTIC ORAL)    Take 1 tablet by mouth once daily.    LEVOCETIRIZINE (XYZAL) 5 MG TABLET    Take 5 mg by mouth every evening.    METFORMIN (GLUCOPHAGE-XR) 500 MG ER 24HR TABLET    Take 1 tablet (500 mg total) by mouth daily with breakfast.    MICROFIBRILLAR COLLAGEN POWDER    Apply topically once daily. 1 TBS daily    SOY ISOFLA-BLK COHOSH-MAG BARK (ESTROVEN) 155 MG CAP    Take by mouth.    TRIAMCINOLONE ACETONIDE 0.1% (KENALOG) 0.1 % OINTMENT    Apply topically 2 (two) times daily.    VITAMIN E 400 UNIT CAPSULE    Take 400 Units by mouth once daily.

## 2024-03-11 ENCOUNTER — HOSPITAL ENCOUNTER (OUTPATIENT)
Dept: RADIOLOGY | Facility: HOSPITAL | Age: 49
Discharge: HOME | End: 2024-03-11
Attending: FAMILY MEDICINE
Payer: COMMERCIAL

## 2024-03-11 DIAGNOSIS — M25.541 PAIN IN JOINTS OF RIGHT HAND: ICD-10-CM

## 2024-03-11 PROCEDURE — 73130 X-RAY EXAM OF HAND: CPT | Mod: TC,PO,RT

## 2024-03-11 PROCEDURE — 73130 X-RAY EXAM OF HAND: CPT | Mod: 26,RT,, | Performed by: RADIOLOGY

## 2024-09-07 ENCOUNTER — PATIENT MESSAGE (OUTPATIENT)
Dept: INTERNAL MEDICINE | Facility: CLINIC | Age: 49
End: 2024-09-07
Payer: COMMERCIAL

## 2024-09-07 DIAGNOSIS — Z12.11 COLON CANCER SCREENING: Primary | ICD-10-CM

## 2024-09-17 ENCOUNTER — HOSPITAL ENCOUNTER (OUTPATIENT)
Dept: PREADMISSION TESTING | Facility: HOSPITAL | Age: 49
Discharge: HOME | End: 2024-09-17
Attending: FAMILY MEDICINE
Payer: COMMERCIAL

## 2024-09-17 DIAGNOSIS — Z12.11 COLON CANCER SCREENING: Primary | ICD-10-CM

## 2024-09-17 RX ORDER — SODIUM, POTASSIUM,MAG SULFATES 17.5-3.13G
1 SOLUTION, RECONSTITUTED, ORAL ORAL DAILY
Qty: 1 KIT | Refills: 0 | Status: SHIPPED | OUTPATIENT
Start: 2024-09-17 | End: 2024-09-19

## 2024-10-05 DIAGNOSIS — Z00.00 ROUTINE ADULT HEALTH MAINTENANCE: Primary | ICD-10-CM

## 2024-10-05 DIAGNOSIS — E88.819 INSULIN RESISTANCE: ICD-10-CM

## 2024-10-05 NOTE — TELEPHONE ENCOUNTER
Care Due:                  Date            Visit Type   Department     Provider  --------------------------------------------------------------------------------                                MYCHART                              FOLLOWUP/OF  [facility] INTERNAL  Last Visit: 03-      FICE VISIT   MEDICINE       Dianna Issa  Next Visit: None Scheduled  None         None Found                                                            Last  Test          Frequency    Reason                     Performed    Due Date  --------------------------------------------------------------------------------    Cr..........  12 months..  metFORMIN................  06- 06-    HBA1C.......  6 months...  metFORMIN................  06- 12-    [facility] Embedded Care Due Messages. Reference number: [id number].   10/05/2024 8:48:31 AM CDT

## 2024-10-06 NOTE — TELEPHONE ENCOUNTER
Refill Routing Note   Medication(s) are not appropriate for processing by [facility] for the following reason(s):      Required labs outdated    [facility] action(s):  Defer Care Due:  Labs due            Appointments  past 12m or future 3m with PCP    Date Provider   Last Visit   3/7/2024 Dianna Issa MD   Next Visit   Visit date not found Dianna Issa MD   ED visits in past 90 days: 0        Note composed:8:46 AM 10/06/2024

## 2024-10-07 RX ORDER — METFORMIN HYDROCHLORIDE 500 MG/1
TABLET, EXTENDED RELEASE ORAL
Qty: 30 TABLET | Refills: 0 | Status: SHIPPED | OUTPATIENT
Start: 2024-10-07

## 2024-10-14 ENCOUNTER — ANESTHESIA (OUTPATIENT)
Dept: ENDOSCOPY | Facility: HOSPITAL | Age: 49
End: 2024-10-14
Payer: COMMERCIAL

## 2024-10-14 ENCOUNTER — ANESTHESIA EVENT (OUTPATIENT)
Dept: ENDOSCOPY | Facility: HOSPITAL | Age: 49
End: 2024-10-14
Payer: COMMERCIAL

## 2024-10-14 ENCOUNTER — HOSPITAL ENCOUNTER (OUTPATIENT)
Facility: HOSPITAL | Age: 49
Discharge: HOME | End: 2024-10-14
Attending: INTERNAL MEDICINE | Admitting: INTERNAL MEDICINE
Payer: COMMERCIAL

## 2024-10-14 DIAGNOSIS — Z12.11 ENCOUNTER FOR SCREENING COLONOSCOPY: Primary | ICD-10-CM

## 2024-10-14 PROCEDURE — G0121 COLON CA SCRN NOT HI RSK IND: HCPCS | Mod: ,,, | Performed by: INTERNAL MEDICINE

## 2024-10-14 PROCEDURE — 37000008 HC ANESTHESIA 1ST 15 MINUTES: Performed by: INTERNAL MEDICINE

## 2024-10-14 PROCEDURE — 63600175 PHARM REV CODE 636 W HCPCS: Performed by: NURSE ANESTHETIST, CERTIFIED REGISTERED

## 2024-10-14 PROCEDURE — 25000003 PHARM REV CODE 250: Performed by: INTERNAL MEDICINE

## 2024-10-14 PROCEDURE — G0121 COLON CA SCRN NOT HI RSK IND: HCPCS | Performed by: INTERNAL MEDICINE

## 2024-10-14 RX ORDER — DEXTROMETHORPHAN/PSEUDOEPHED 2.5-7.5/.8
DROPS ORAL
Status: DISCONTINUED | OUTPATIENT
Start: 2024-10-14 | End: 2024-10-14 | Stop reason: HOSPADM

## 2024-10-14 RX ORDER — LIDOCAINE HYDROCHLORIDE 10 MG/ML
INJECTION, SOLUTION EPIDURAL; INFILTRATION; INTRACAUDAL; PERINEURAL
Status: DISCONTINUED | OUTPATIENT
Start: 2024-10-14 | End: 2024-10-14

## 2024-10-14 RX ORDER — PROPOFOL 10 MG/ML
VIAL (ML) INTRAVENOUS
Status: DISCONTINUED | OUTPATIENT
Start: 2024-10-14 | End: 2024-10-14

## 2024-10-14 RX ADMIN — PROPOFOL 50 MG: 10 INJECTION, EMULSION INTRAVENOUS at 01:10

## 2024-10-14 RX ADMIN — LIDOCAINE HYDROCHLORIDE 50 MG: 10 SOLUTION INTRAVENOUS at 01:10

## 2024-10-14 RX ADMIN — PROPOFOL 100 MG: 10 INJECTION, EMULSION INTRAVENOUS at 01:10

## 2024-10-14 NOTE — TRANSFER OF CARE
"Anesthesia Transfer of Care Note    Patient: Josh Simental    Procedure(s) Performed: Procedure(s) (LRB):  COLONOSCOPY (N/A)    Patient location: GI    Anesthesia Type: MAC    Transport from OR: Transported from OR on room air with adequate spontaneous ventilation    Post pain: adequate analgesia    Post assessment: no apparent anesthetic complications    Post vital signs: stable    Level of consciousness: sedated    Nausea/Vomiting: no nausea/vomiting    Complications: none    Transfer of care protocol was followed      Last vitals: Visit Vitals  /71   Pulse 78   Temp 36.7 °C (98.1 °F)   Resp 20   Ht 5' 5" (1.651 m)   Wt 87.1 kg (192 lb)   SpO2 99%   Breastfeeding No   BMI 31.95 kg/m²     "

## 2024-10-14 NOTE — DISCHARGE SUMMARY
[facility] - Endoscopy (Hospital)  Discharge Note  Short Stay    Procedure(s) (LRB):  COLONOSCOPY (N/A)      OUTCOME: Patient tolerated treatment/procedure well without complication and is now ready for discharge.    DISPOSITION: Home or Self Care    FINAL DIAGNOSIS:  Encounter for screening colonoscopy    FOLLOWUP: With primary care provider    DISCHARGE INSTRUCTIONS:  No discharge procedures on file.

## 2024-10-14 NOTE — [employer]ATION PATIENT INSTRUCTIONS
Discharge Summary/Instructions after an Endoscopic Procedure  Patient Name: Josh Simental  Patient MRN: [id number]  Patient YOB: 1975  Monday, October 14, 2024 Christelle Corey MD  Dear patient,  As a result of recent federal legislation (The Federal Cures Act), you may   receive lab or pathology results from your procedure in your [facility]   account before your physician is able to contact you. Your physician or   their representative will relay the results to you with their   recommendations at their soonest availability.  Thank you,  RESTRICTIONS:  During your procedure today, you received medications for sedation.  These   medications may affect your judgment, balance and coordination.  Therefore,   for 24 hours, you have the following restrictions:   - DO NOT drive a car, operate machinery, make legal/financial decisions,   sign important papers or drink alcohol.    ACTIVITY:  Today: no heavy lifting, straining or running due to procedural   sedation/anesthesia.  The following day: return to full activity including work.  DIET:  Eat and drink normally unless instructed otherwise.     TREATMENT FOR COMMON SIDE EFFECTS:  - Mild abdominal pain, nausea, belching, bloating or excessive gas:  rest,   eat lightly and use a heating pad.  - Sore Throat: treat with throat lozenges and/or gargle with warm salt   water.  - Because air was used during the procedure, expelling large amounts of air   from your rectum or belching is normal.  - If a bowel prep was taken, you may not have a bowel movement for 1-3 days.    This is normal.  SYMPTOMS TO WATCH FOR AND REPORT TO YOUR PHYSICIAN:  1. Abdominal pain or bloating, other than gas cramps.  2. Chest pain.  3. Back pain.  4. Signs of infection such as: chills or fever occurring within 24 hours   after the procedure.  5. Rectal bleeding, which would show as bright red, maroon, or black stools.   (A tablespoon of blood from the rectum is not serious, especially  if   hemorrhoids are present.)  6. Vomiting.  7. Weakness or dizziness.  GO DIRECTLY TO THE NEAREST EMERGENCY ROOM IF YOU HAVE ANY OF THE FOLLOWING:      Difficulty breathing              Chills and/or fever over 101 F   Persistent vomiting and/or vomiting blood   Severe abdominal pain   Severe chest pain   Black, tarry stools   Bleeding- more than one tablespoon   Any other symptom or condition that you feel may need urgent attention  Your doctor recommends these additional instructions:  If any biopsies were taken, your doctors clinic will contact you in 1 to 2   weeks with any results.  - Discharge patient to home (via wheelchair).   - Resume previous diet.   - Continue present medications.   - Repeat colonoscopy with an extended prep in 1 year because the bowel   preparation was suboptimal.   - Patient has a contact number available for emergencies.  The signs and   symptoms of potential delayed complications were discussed with the   patient.  Return to normal activities tomorrow.  Written discharge   instructions were provided to the patient.  For questions, problems or results please call your physician Christelle Corey MD at Work:  [phone]  If you have any questions about the above instructions, call the GI   department at [phone] or call the endoscopy unit at [phone]   from 7am until 3 pm.  [facility], EMERGENCY ROOM PHONE NUMBER:   [phone]  IF A COMPLICATION OR EMERGENCY SITUATION ARISES AND YOU ARE UNABLE TO REACH   YOUR PHYSICIAN - GO DIRECTLY TO THE EMERGENCY ROOM.  I have read or have had read to me these discharge instructions for my   procedure and have received a written copy.  I understand these   instructions and will follow-up with my physician if I have any questions.     __________________________________       _____________________________________  Nurse Signature                                          Patient/Designated   Responsible  Party Signature  MD Christelle Franco MD  10/14/2024 1:13:09 PM  [employer]

## 2024-10-14 NOTE — H&P
Short Stay Endoscopy History and Physical    PCP - Dianna Issa MD    Procedure - Colonoscopy  ASA - per anesthesia  Mallampati - per anesthesia  History of Anesthesia problems - no  Family history Anesthesia problems -  no     HPI:  This is a 49 y.o. female here for evaluation of :   Active Hospital Problems    Diagnosis  POA    *Encounter for screening colonoscopy [Z12.11]  Not Applicable      Resolved Hospital Problems   No resolved problems to display.         Health Maintenance         Date Due Completion Date    Colorectal Cancer Screening 06/02/2024 6/2/2021    Mammogram 08/31/2024 8/31/2023    Influenza Vaccine (1) 09/01/2024 3/10/2019    COVID-19 Vaccine (1 - 2024-25 season) Never done ---    Hemoglobin A1c (Diabetic Prevention Screening) 06/22/2026 6/22/2023    Lipid Panel 06/22/2028 6/22/2023    Cervical Cancer Screening 08/31/2028 8/31/2023    TETANUS VACCINE 02/27/2030 2/27/2020    RSV Vaccine (Age 60+ and Pregnant patients) (1 - 1-dose 75+ series) 03/27/2050 ---              ROS:  CONSTITUTIONAL: Denies weight change,  fatigue, fevers, chills, night sweats.  CARDIOVASCULAR: Denies chest pain, shortness of breath, orthopnea and edema.  RESPIRATORY: Denies cough, hemoptysis, dyspnea, and wheezing.  GI: See HPI.    Medical History:   Past Medical History:   Diagnosis Date    Allergy     Anxiety     Depression     Endometriosis     Endometriosis 4/17/2019 10:14:15 AM    [facility] Historical - DO NOT USE: Endometriosis-No Additional Notes    Insulin resistance     Migraines     Other depressive disorder 4/17/2019 10:12:51 AM    [facility] Historical - [facility]: Depression-No Additional Notes    Pure hypercholesterolemia 6/30/2021 9:34:13 AM    [facility] Historical - Endocrine/Metabolic/Immune: Hypercholesterolemia-No Additional Notes    Sinusitis        Surgical History:   Past Surgical History:   Procedure Laterality Date    CARPAL TUNNEL RELEASE Left 07/14/2022    Procedure: RELEASE, CARPAL  TUNNEL;  Surgeon: Daryl Glover MD;  Location: [facility] OR;  Service: Orthopedics;  Laterality: Left;    CHOLECYSTECTOMY  2017    ESOPHAGOGASTRODUODENOSCOPY N/A 2023    Procedure: EGD (ESOPHAGOGASTRODUODENOSCOPY);  Surgeon: Eileen Yo MD;  Location: [facility] ENDO;  Service: Endoscopy;  Laterality: N/A;    EYE SURGERY      LASIK    GALLBLADDER SURGERY  2017    hysteroscope      LAPAROSCOPY      dx 2/2 endometriosis    TUBAL LIGATION         Family History:   Family History   Problem Relation Name Age of Onset    Stroke Paternal Grandmother      Cancer Maternal Grandmother          Cervical     Diabetes Maternal Grandfather      Cancer Maternal Grandfather          Prostate    Diabetes Father      Cancer Father          Renal     Diabetes Mother      Thyroid disease Mother      Breast cancer Other great aunt        Social History:   Social History     Tobacco Use    Smoking status: Former     Current packs/day: 0.00     Average packs/day: 0.3 packs/day for 2.0 years (0.5 ttl pk-yrs)     Types: Cigarettes     Start date: 1997     Quit date: 1999     Years since quittin.3    Smokeless tobacco: Never    Tobacco comments:     Does not smoke   Substance Use Topics    Alcohol use: Yes     Comment: rarely    Drug use: No       Allergies:   Review of patient's allergies indicates:   Allergen Reactions    No known drug allergies        Medications:   Current Facility-Administered Medications on File Prior to Encounter   Medication Dose Route Frequency Provider Last Rate Last Admin    chlorhexidine 0.12 % solution 10 mL  10 mL Mouth/Throat On Call Procedure Rose Santo PA-C   10 mL at 22 0655     Current Outpatient Medications on File Prior to Encounter   Medication Sig Dispense Refill    ipratropium (ATROVENT) 21 mcg (0.03 %) nasal spray 2 sprays by Each Nostril route 3 (three) times daily. 20 mL 5    LACTOBACILLUS ACIDOPHILUS (PROBIOTIC ORAL) Take 1 tablet by mouth once daily.       levocetirizine (XYZAL) 5 MG tablet Take 5 mg by mouth every evening.      microfibrillar collagen powder Apply topically once daily. 1 TBS daily      soy isofla-blk cohosh-mag bark (ESTROVEN) 155 mg Cap Take by mouth.      biotin 1 mg tablet Take 1,000 mcg by mouth 3 (three) times daily.      calcium citrate-vitamin D3 315-200 mg (CITRACAL+D) 315 mg-5 mcg (200 unit) per tablet Take 1 tablet by mouth 2 (two) times daily.      triamcinolone acetonide 0.1% (KENALOG) 0.1 % ointment Apply topically 2 (two) times daily. 60 g 2    vitamin E 400 UNIT capsule Take 400 Units by mouth once daily.         Physical Exam:  Vital Signs:   Vitals:    10/14/24 1155   BP: 106/71   Pulse: 78   Resp: 20   Temp: 98.1 °F (36.7 °C)     General Appearance: Well appearing in no acute distress  ENT: OP clear  Chest: CTA B  CV: RRR, no m/r/g  Abd: s/nt/nd/nabs  Ext: no edema    Labs:Reviewed    IMP:  Active Hospital Problems    Diagnosis  POA    *Encounter for screening colonoscopy [Z12.11]  Not Applicable      Resolved Hospital Problems   No resolved problems to display.         Plan:   I have explained the risks and benefits of colonoscopy to the patient including but not limited to bleeding, perforation, infection, and death. The patient wishes to proceed.

## 2024-10-14 NOTE — PLAN OF CARE
Dr. Corey  at bedside discussing findings. VSS. Patient alert. No N/V. No bleeding, no pain. Patient released from unit.

## 2024-10-15 VITALS
SYSTOLIC BLOOD PRESSURE: 101 MMHG | WEIGHT: 192 LBS | RESPIRATION RATE: 17 BRPM | BODY MASS INDEX: 31.99 KG/M2 | HEART RATE: 78 BPM | DIASTOLIC BLOOD PRESSURE: 77 MMHG | HEIGHT: 65 IN | TEMPERATURE: 98 F | OXYGEN SATURATION: 97 %

## 2024-10-18 NOTE — ANESTHESIA POSTPROCEDURE EVALUATION
Anesthesia Post Evaluation    Patient: Josh Simental    Procedure(s) Performed: Procedure(s) (LRB):  COLONOSCOPY (N/A)    Final Anesthesia Type: MAC      Patient location during evaluation: GI PACU  Patient participation: Yes- Able to Participate  Level of consciousness: awake and alert and oriented  Post-procedure vital signs: reviewed and stable  Pain management: adequate  Airway patency: patent  JUDIE mitigation strategies: Multimodal analgesia and Extubation and recovery carried out in lateral, semiupright, or other nonsupine position  PONV status at discharge: No PONV  Anesthetic complications: no      Cardiovascular status: blood pressure returned to baseline and hemodynamically stable  Respiratory status: unassisted and spontaneous ventilation  Hydration status: euvolemic  Follow-up not needed.  Comments: Report given to PACU RN. Hand Off Tool Used. RN given opportunity to ask questions or clarify concerns. No Concerns verbalized. Pt. Left in stable condition. SV. Vital Signs Return to Near Baseline. No s/s of distress noted.               Vitals Value Taken Time   /77 10/14/24 1324   Temp 36.5 °C (97.7 °F) 10/14/24 1324   Pulse 78 10/14/24 1324   Resp 17 10/14/24 1324   SpO2 97 % 10/14/24 1324         Event Time   Out of Recovery 13:30:14         Pain/Juan Score: No data recorded         Scribe Attestation (For Scribes USE Only)... Attending Attestation (For Attendings USE Only).../Scribe Attestation (For Scribes USE Only)...

## 2024-10-29 ENCOUNTER — LAB VISIT (OUTPATIENT)
Dept: LAB | Facility: HOSPITAL | Age: 49
End: 2024-10-29
Attending: FAMILY MEDICINE
Payer: COMMERCIAL

## 2024-10-29 DIAGNOSIS — Z00.00 ROUTINE ADULT HEALTH MAINTENANCE: ICD-10-CM

## 2024-10-29 DIAGNOSIS — E88.819 INSULIN RESISTANCE: ICD-10-CM

## 2024-10-29 LAB
ALBUMIN SERPL BCP-MCNC: 4 G/DL (ref 3.5–5.2)
ALP SERPL-CCNC: 81 U/L (ref 40–150)
ALT SERPL W/O P-5'-P-CCNC: 107 U/L (ref 10–44)
ANION GAP SERPL CALC-SCNC: 12 MMOL/L (ref 8–16)
AST SERPL-CCNC: 49 U/L (ref 10–40)
BASOPHILS # BLD AUTO: 0.05 K/UL (ref 0–0.2)
BASOPHILS NFR BLD: 1 % (ref 0–1.9)
BILIRUB SERPL-MCNC: 0.4 MG/DL (ref 0.1–1)
BUN SERPL-MCNC: 17 MG/DL (ref 6–20)
CALCIUM SERPL-MCNC: 9.8 MG/DL (ref 8.7–10.5)
CHLORIDE SERPL-SCNC: 109 MMOL/L (ref 95–110)
CHOLEST SERPL-MCNC: 198 MG/DL (ref 120–199)
CHOLEST/HDLC SERPL: 2.8 {RATIO} (ref 2–5)
CO2 SERPL-SCNC: 23 MMOL/L (ref 23–29)
CREAT SERPL-MCNC: 0.8 MG/DL (ref 0.5–1.4)
DIFFERENTIAL METHOD BLD: ABNORMAL
EOSINOPHIL # BLD AUTO: 0.2 K/UL (ref 0–0.5)
EOSINOPHIL NFR BLD: 3 % (ref 0–8)
ERYTHROCYTE [DISTWIDTH] IN BLOOD BY AUTOMATED COUNT: 12.9 % (ref 11.5–14.5)
EST. GFR  (NO RACE VARIABLE): >60 ML/MIN/1.73 M^2
ESTIMATED AVG GLUCOSE: 117 MG/DL (ref 68–131)
GLUCOSE SERPL-MCNC: 83 MG/DL (ref 70–110)
HBA1C MFR BLD: 5.7 % (ref 4–5.6)
HCT VFR BLD AUTO: 41.1 % (ref 37–48.5)
HDLC SERPL-MCNC: 72 MG/DL (ref 40–75)
HDLC SERPL: 36.4 % (ref 20–50)
HGB BLD-MCNC: 12.5 G/DL (ref 12–16)
IMM GRANULOCYTES # BLD AUTO: 0.01 K/UL (ref 0–0.04)
IMM GRANULOCYTES NFR BLD AUTO: 0.2 % (ref 0–0.5)
LDLC SERPL CALC-MCNC: 108.2 MG/DL (ref 63–159)
LYMPHOCYTES # BLD AUTO: 1.4 K/UL (ref 1–4.8)
LYMPHOCYTES NFR BLD: 27.4 % (ref 18–48)
MCH RBC QN AUTO: 28.6 PG (ref 27–31)
MCHC RBC AUTO-ENTMCNC: 30.4 G/DL (ref 32–36)
MCV RBC AUTO: 94 FL (ref 82–98)
MONOCYTES # BLD AUTO: 0.3 K/UL (ref 0.3–1)
MONOCYTES NFR BLD: 5.9 % (ref 4–15)
NEUTROPHILS # BLD AUTO: 3.1 K/UL (ref 1.8–7.7)
NEUTROPHILS NFR BLD: 62.5 % (ref 38–73)
NONHDLC SERPL-MCNC: 126 MG/DL
NRBC BLD-RTO: 0 /100 WBC
PLATELET # BLD AUTO: 295 K/UL (ref 150–450)
PMV BLD AUTO: 10.6 FL (ref 9.2–12.9)
POTASSIUM SERPL-SCNC: 4.8 MMOL/L (ref 3.5–5.1)
PROT SERPL-MCNC: 7.2 G/DL (ref 6–8.4)
RBC # BLD AUTO: 4.37 M/UL (ref 4–5.4)
SODIUM SERPL-SCNC: 144 MMOL/L (ref 136–145)
T4 FREE SERPL-MCNC: 0.8 NG/DL (ref 0.71–1.51)
TRIGL SERPL-MCNC: 89 MG/DL (ref 30–150)
TSH SERPL DL<=0.005 MIU/L-ACNC: 2.9 UIU/ML (ref 0.4–4)
WBC # BLD AUTO: 4.93 K/UL (ref 3.9–12.7)

## 2024-10-29 PROCEDURE — 85025 COMPLETE CBC W/AUTO DIFF WBC: CPT | Performed by: FAMILY MEDICINE

## 2024-10-29 PROCEDURE — 80053 COMPREHEN METABOLIC PANEL: CPT | Performed by: FAMILY MEDICINE

## 2024-10-29 PROCEDURE — 80061 LIPID PANEL: CPT | Performed by: FAMILY MEDICINE

## 2024-10-29 PROCEDURE — 84443 ASSAY THYROID STIM HORMONE: CPT | Performed by: FAMILY MEDICINE

## 2024-10-29 PROCEDURE — 84439 ASSAY OF FREE THYROXINE: CPT | Performed by: FAMILY MEDICINE

## 2024-10-29 PROCEDURE — 83036 HEMOGLOBIN GLYCOSYLATED A1C: CPT | Performed by: FAMILY MEDICINE

## 2024-11-05 ENCOUNTER — OFFICE VISIT (OUTPATIENT)
Dept: INTERNAL MEDICINE | Facility: CLINIC | Age: 49
End: 2024-11-05
Payer: COMMERCIAL

## 2024-11-05 ENCOUNTER — HOSPITAL ENCOUNTER (OUTPATIENT)
Dept: RADIOLOGY | Facility: HOSPITAL | Age: 49
Discharge: HOME | End: 2024-11-05
Attending: FAMILY MEDICINE
Payer: COMMERCIAL

## 2024-11-05 VITALS
HEART RATE: 70 BPM | WEIGHT: 198.19 LBS | DIASTOLIC BLOOD PRESSURE: 64 MMHG | SYSTOLIC BLOOD PRESSURE: 110 MMHG | HEIGHT: 65 IN | BODY MASS INDEX: 33.02 KG/M2 | TEMPERATURE: 98 F | OXYGEN SATURATION: 99 %

## 2024-11-05 DIAGNOSIS — M25.552 BILATERAL HIP PAIN: ICD-10-CM

## 2024-11-05 DIAGNOSIS — M25.551 BILATERAL HIP PAIN: ICD-10-CM

## 2024-11-05 DIAGNOSIS — R73.03 PREDIABETES: Primary | ICD-10-CM

## 2024-11-05 DIAGNOSIS — R79.89 ELEVATED LFTS: ICD-10-CM

## 2024-11-05 PROCEDURE — 73522 X-RAY EXAM HIPS BI 3-4 VIEWS: CPT | Mod: 26,,, | Performed by: RADIOLOGY

## 2024-11-05 PROCEDURE — 72110 X-RAY EXAM L-2 SPINE 4/>VWS: CPT | Mod: TC,PO

## 2024-11-05 PROCEDURE — 73522 X-RAY EXAM HIPS BI 3-4 VIEWS: CPT | Mod: TC,PO

## 2024-11-05 PROCEDURE — 1159F MED LIST DOCD IN RCRD: CPT | Mod: CPTII,S$GLB,, | Performed by: FAMILY MEDICINE

## 2024-11-05 PROCEDURE — 3044F HG A1C LEVEL LT 7.0%: CPT | Mod: CPTII,S$GLB,, | Performed by: FAMILY MEDICINE

## 2024-11-05 PROCEDURE — 99999 PR PBB SHADOW E&M-EST. PATIENT-LVL IV: CPT | Mod: PBBFAC,,, | Performed by: FAMILY MEDICINE

## 2024-11-05 PROCEDURE — 72110 X-RAY EXAM L-2 SPINE 4/>VWS: CPT | Mod: 26,,, | Performed by: RADIOLOGY

## 2024-11-05 PROCEDURE — 3074F SYST BP LT 130 MM HG: CPT | Mod: CPTII,S$GLB,, | Performed by: FAMILY MEDICINE

## 2024-11-05 PROCEDURE — 99214 OFFICE O/P EST MOD 30 MIN: CPT | Mod: S$GLB,,, | Performed by: FAMILY MEDICINE

## 2024-11-05 PROCEDURE — 3008F BODY MASS INDEX DOCD: CPT | Mod: CPTII,S$GLB,, | Performed by: FAMILY MEDICINE

## 2024-11-05 PROCEDURE — G2211 COMPLEX E/M VISIT ADD ON: HCPCS | Mod: S$GLB,,, | Performed by: FAMILY MEDICINE

## 2024-11-05 PROCEDURE — 3078F DIAST BP <80 MM HG: CPT | Mod: CPTII,S$GLB,, | Performed by: FAMILY MEDICINE

## 2024-11-05 RX ORDER — METFORMIN HYDROCHLORIDE 500 MG/1
500 TABLET, EXTENDED RELEASE ORAL 2 TIMES DAILY WITH MEALS
Qty: 60 TABLET | Refills: 5 | Status: SHIPPED | OUTPATIENT
Start: 2024-11-05

## 2024-11-05 RX ORDER — MELOXICAM 15 MG/1
15 TABLET ORAL DAILY
Qty: 14 TABLET | Refills: 0 | Status: SHIPPED | OUTPATIENT
Start: 2024-11-05

## 2024-11-05 NOTE — PROGRESS NOTES
Subjective:      Patient ID: Josh Simental is a 49 y.o. female.    Chief Complaint: Follow-up      History of Present Illness    CHIEF COMPLAINT:  Ms. Simental presents today for follow-up on lab results and joint pain.    PRE-DIABETES AND METABOLIC HEALTH:  Her A1C is in the pre-diabetes range. She is currently taking metformin for insulin resistance. Over the past six months, she has modified her diet to include higher fiber and protein intake, and lower carbohydrate consumption. She has experienced weight fluctuations, with a net loss of 4 lbs. She reports possible reactive hypoglycemia symptoms, feeling unwell a few hours after lunch for a few days about two weeks ago. Despite dietary changes, she denies any significant improvement in blood sugar. Her cholesterol levels have improved, with LDL decreasing from 130 to 108, while HDL remains high.    LIVER FUNCTION:  She reports elevated liver function tests. She denies abdominal pain or nausea prior to the blood test. She underwent a colonoscopy approximately two weeks before the blood test.    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 She experiences daily hip pain, more pronounced on the left side, which worsens at the end of the day when lying in bed. She occasionally uses ibuprofen for severe pain. She previously tried a six-week course of Aleve twice daily but was reluctant to maintain a regimented medication schedule. Recently, she experienced lower back pain after gardening activities, including lifting and carrying bags of mulch. She reports pain when lifting her leg.    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 She is making efforts to increase her physical activity by using stairs instead of elevators and accessing the gym at work. She has found a workout partner for motivation. Despite feeling  "constantly exhausted, she acknowledges the importance of exercise and is slowly progressing in her efforts to be more active. She notes a history of being very physically active before menopause, describing herself as a "gym rat" who exercised daily, but reports that her body's response to menopause has significantly reduced her exercise tolerance.        Past Medical History:   Diagnosis Date    Allergy     Anxiety     Depression     Endometriosis     Endometriosis 4/17/2019 10:14:15 AM    [facility] Historical - DO NOT USE: Endometriosis-No Additional Notes    Insulin resistance     Migraines     Other depressive disorder 4/17/2019 10:12:51 AM    [facility] Historical - [facility]: Depression-No Additional Notes    Pure hypercholesterolemia 6/30/2021 9:34:13 AM    [facility] Historical - Endocrine/Metabolic/Immune: Hypercholesterolemia-No Additional Notes    Sinusitis           Past Surgical History:   Procedure Laterality Date    CARPAL TUNNEL RELEASE Left 07/14/2022    Procedure: RELEASE, CARPAL TUNNEL;  Surgeon: Daryl Glover MD;  Location: [facility];  Service: Orthopedics;  Laterality: Left;    CHOLECYSTECTOMY  07/2017    COLONOSCOPY N/A 10/14/2024    Procedure: COLONOSCOPY;  Surgeon: Christelle Corey MD;  Location: [facility];  Service: Endoscopy;  Laterality: N/A;    ESOPHAGOGASTRODUODENOSCOPY N/A 6/16/2023    Procedure: EGD (ESOPHAGOGASTRODUODENOSCOPY);  Surgeon: Eileen Yo MD;  Location: [facility];  Service: Endoscopy;  Laterality: N/A;    EYE SURGERY      LASIK    GALLBLADDER SURGERY  07/2017    hysteroscope      LAPAROSCOPY  1994    dx 2/2 endometriosis    TUBAL LIGATION       Family History   Problem Relation Name Age of Onset    Stroke Paternal Grandmother      Cancer Maternal Grandmother          Cervical     Diabetes Maternal Grandfather      Cancer Maternal Grandfather          Prostate    Diabetes Father      Cancer Father          Renal     Diabetes Mother      Thyroid disease " Mother      Breast cancer Other great aunt      Social History     Socioeconomic History    Marital status:    Tobacco Use    Smoking status: Former     Current packs/day: 0.00     Average packs/day: 0.3 packs/day for 2.0 years (0.5 ttl pk-yrs)     Types: Cigarettes     Start date: 1997     Quit date: 1999     Years since quittin.4    Smokeless tobacco: Never    Tobacco comments:     Does not smoke   Substance and Sexual Activity    Alcohol use: Yes     Comment: rarely    Drug use: No    Sexual activity: Yes     Partners: Male     Birth control/protection: None     Social Drivers of Health     Financial Resource Strain: Low Risk  (3/6/2024)    Overall Financial Resource Strain (CARDIA)     Difficulty of Paying Living Expenses: Not hard at all   Food Insecurity: No Food Insecurity (3/6/2024)    Hunger Vital Sign     Worried About Running Out of Food in the Last Year: Never true     Ran Out of Food in the Last Year: Never true   Transportation Needs: No Transportation Needs (3/6/2024)    PRAPARE - Transportation     Lack of Transportation (Medical): No     Lack of Transportation (Non-Medical): No   Physical Activity: Insufficiently Active (3/6/2024)    Exercise Vital Sign     Days of Exercise per Week: 3 days     Minutes of Exercise per Session: 20 min   Stress: Stress Concern Present (3/6/2024)    [employer] - Occupational Stress Questionnaire     Feeling of Stress : To some extent   Housing Stability: Low Risk  (3/6/2024)    Housing Stability Vital Sign     Unable to Pay for Housing in the Last Year: No     Number of Places Lived in the Last Year: 1     Unstable Housing in the Last Year: No     Review of patient's allergies indicates:   Allergen Reactions    No known drug allergies        Review of Systems   Constitutional:  Positive for malaise/fatigue. Negative for weight loss.   Respiratory:  Negative for cough and shortness of breath.    Cardiovascular:   "Negative for chest pain and leg swelling.   Gastrointestinal:  Negative for abdominal pain.   Musculoskeletal:  Positive for joint pain.     Objective:       /64 (BP Location: Right arm)   Pulse 70   Temp 97.9 °F (36.6 °C) (Tympanic)   Ht 5' 5" (1.651 m)   Wt 89.9 kg (198 lb 3.1 oz)   LMP 08/06/2022 (Exact Date)   SpO2 99%   BMI 32.98 kg/m²   Physical Exam    Lungs: Normal breath sounds.        Physical Exam  Vitals reviewed.   Constitutional:       General: She is not in acute distress.     Appearance: Normal appearance. She is well-developed. She is not ill-appearing or diaphoretic.   HENT:      Head: Normocephalic and atraumatic.      Right Ear: Hearing, tympanic membrane, ear canal and external ear normal.      Left Ear: Hearing, tympanic membrane, ear canal and external ear normal.      Nose: Nose normal.      Mouth/Throat:      Pharynx: Uvula midline. No oropharyngeal exudate.   Eyes:      Conjunctiva/sclera: Conjunctivae normal.      Pupils: Pupils are equal, round, and reactive to light.   Neck:      Thyroid: No thyromegaly.      Trachea: No tracheal deviation.   Cardiovascular:      Rate and Rhythm: Normal rate and regular rhythm.      Heart sounds: Normal heart sounds. No murmur heard.  Pulmonary:      Effort: Pulmonary effort is normal. No respiratory distress.      Breath sounds: Normal breath sounds.   Abdominal:      General: Bowel sounds are normal.      Palpations: Abdomen is soft.      Tenderness: There is no abdominal tenderness. There is no guarding.      Hernia: No hernia is present.   Musculoskeletal:         General: Normal range of motion.      Cervical back: Normal range of motion and neck supple.      Right lower leg: No edema.      Left lower leg: No edema.   Lymphadenopathy:      Cervical: No cervical adenopathy.   Skin:     General: Skin is warm and dry.      Capillary Refill: Capillary refill takes less than 2 seconds.   Neurological:      General: No focal deficit present. "      Mental Status: She is alert and oriented to person, place, and time.   Psychiatric:         Mood and Affect: Mood normal.         Behavior: Behavior normal.         Thought Content: Thought content normal.         Judgment: Judgment normal.         Assessment:     1. Prediabetes    2. Elevated LFTs    3. Bilateral hip pain      Plan:   Assessment & Plan    PREDIABETES/BORDERLINE DIABETES:  - Ms. Simental in pre-diabetes/borderline diabetes range (A1C 5.6-6.4) despite metformin use.  - Explained prediabetes range and implications for glucose control.  - Educated on reactive hypoglycemia and its symptoms.  - Provided information on continuous glucose monitoring as a learning tool for glucose trends.  - Ms. Simental to continue efforts to increase physical activity, including using stairs and accessing workplace gym.  - Ms. Simental to maintain dietary changes (higher fiber, higher protein, lower carbs).  - Increased metformin to 500 mg twice daily, to be taken with meals.    ELEVATED LIVER FUNCTION TESTS:  - Liver function tests elevated, concerning for possible fatty liver associated with prediabetes/diabetes, viral infection, or other causes.  - Discussed possible causes of elevated liver function tests.  - If liver function test levels are about the same, schedule ultrasound.  - If liver function test levels are improving significantly, repeat tests in 1 week.  - If liver function test levels are escalating, schedule ultrasound and possible specialist referral.    HYPERLIPIDEMIA:  - Cholesterol levels improved.    JOINT PAIN:  - Joint pain, particularly in left hip, may be related to lower back issues (narrowing between L4-L5, transitional L5 vertebra) noted on previous imaging.  - Continued ibuprofen as needed for joint pain.  - X-rays of lower back and hip ordered.    LABS:  - Metabolic panel ordered today (non-fasting).    FOLLOW UP:  - Follow up after x-rays for review.  - Contact the office for liver  function test results and further plans.        Prediabetes  -     Hemoglobin A1C; Future; Expected date: 05/04/2025  -     Comprehensive Metabolic Panel; Future; Expected date: 05/04/2025  -     Lipid Panel; Future; Expected date: 05/04/2025  -     TSH; Future; Expected date: 05/04/2025  -     CBC Auto Differential; Future; Expected date: 05/04/2025    Elevated LFTs  -     Comprehensive Metabolic Panel; Future; Expected date: 05/04/2025    Bilateral hip pain  -     X-Ray Lumbar Spine 5 View; Future; Expected date: 11/05/2024  -     X-Ray Hip 3 or 4 views Bilateral; Future; Expected date: 11/05/2024    Other orders  -     metFORMIN (GLUCOPHAGE-XR) 500 MG ER 24hr tablet; Take 1 tablet (500 mg total) by mouth 2 (two) times daily with meals.  Dispense: 60 tablet; Refill: 5  -     meloxicam (MOBIC) 15 MG tablet; Take 1 tablet (15 mg total) by mouth once daily. Take with meal.  Dispense: 14 tablet; Refill: 0    Will recheck metabolic panel again today to reassess liver function tests  Other labs in 6 months prior to visit with me  Discussed possibility of physical therapy-patient wants to wait on this for now, will try home stretching exercises  Medication List with Changes/Refills   New Medications    MELOXICAM (MOBIC) 15 MG TABLET    Take 1 tablet (15 mg total) by mouth once daily. Take with meal.   Current Medications    IPRATROPIUM (ATROVENT) 21 MCG (0.03 %) NASAL SPRAY    2 sprays by Each Nostril route 3 (three) times daily.    LEVOCETIRIZINE (XYZAL) 5 MG TABLET    Take 5 mg by mouth every evening.    MICROFIBRILLAR COLLAGEN POWDER    Apply topically once daily. 1 TBS daily    TRIAMCINOLONE ACETONIDE 0.1% (KENALOG) 0.1 % OINTMENT    Apply topically 2 (two) times daily.   Changed and/or Refilled Medications    Modified Medication Previous Medication    METFORMIN (GLUCOPHAGE-XR) 500 MG ER 24HR TABLET metFORMIN (GLUCOPHAGE-XR) 500 MG ER 24hr tablet       Take 1 tablet (500 mg total) by mouth 2 (two) times daily with  meals.    TAKE ONE TABLET BY MOUTH DAILY WITH BREAKFAST **THANK YOU**   Discontinued Medications    BIOTIN 1 MG TABLET    Take 1,000 mcg by mouth 3 (three) times daily.    CALCIUM CITRATE-VITAMIN D3 315-200 MG (CITRACAL+D) 315 MG-5 MCG (200 UNIT) PER TABLET    Take 1 tablet by mouth 2 (two) times daily.    LACTOBACILLUS ACIDOPHILUS (PROBIOTIC ORAL)    Take 1 tablet by mouth once daily.    SOY ISOFLA-BLK COHOSH-MAG BARK (ESTROVEN) 155 MG CAP    Take by mouth.    VITAMIN E 400 UNIT CAPSULE    Take 400 Units by mouth once daily.       This note was generated with the assistance of ambient listening technology. Verbal consent was obtained by the patient and accompanying visitor(s) for the recording of patient appointment to facilitate this note. I attest to having reviewed and edited the generated note for accuracy, though some syntax or spelling errors may persist. Please contact the author of this note for any clarification.

## 2024-11-14 NOTE — ANESTHESIA PREPROCEDURE EVALUATION
10/14/2024  Josh Simental is a 49 y.o., female.    Patient Active Problem List   Diagnosis    Migraines    Depression    Internal hemorrhoid    Allergic rhinitis    Herpes labialis    Shoulder impingement syndrome, left    Biceps muscle strain, left, initial encounter    Insulin resistance    Obese    Menopausal hot flushes    Globus sensation     Past Medical History:   Diagnosis Date    Allergy     Anxiety     Depression     Endometriosis     Endometriosis 4/17/2019 10:14:15 AM    [facility] Historical - DO NOT USE: Endometriosis-No Additional Notes    Insulin resistance     Migraines     Other depressive disorder 4/17/2019 10:12:51 AM    [facility] Historical - [facility]: Depression-No Additional Notes    Pure hypercholesterolemia 6/30/2021 9:34:13 AM    [facility] Historical - Endocrine/Metabolic/Immune: Hypercholesterolemia-No Additional Notes    Sinusitis      Past Surgical History:   Procedure Laterality Date    CARPAL TUNNEL RELEASE Left 07/14/2022    Procedure: RELEASE, CARPAL TUNNEL;  Surgeon: Daryl Glover MD;  Location: [facility];  Service: Orthopedics;  Laterality: Left;    CHOLECYSTECTOMY  07/2017    ESOPHAGOGASTRODUODENOSCOPY N/A 6/16/2023    Procedure: EGD (ESOPHAGOGASTRODUODENOSCOPY);  Surgeon: Eileen Yo MD;  Location: [facility];  Service: Endoscopy;  Laterality: N/A;    EYE SURGERY      LASIK    GALLBLADDER SURGERY  07/2017    hysteroscope      LAPAROSCOPY  1994    dx 2/2 endometriosis    TUBAL LIGATION           Pre-op Assessment    I have reviewed the Patient Summary Reports.    I have reviewed the NPO Status.   I have reviewed the Medications.     Review of Systems  Anesthesia Hx:  No problems with previous Anesthesia             Denies Family Hx of Anesthesia complications.    Denies Personal Hx of Anesthesia complications.             Continue losartan 25 mg daily  Continue amlodipine 5 mg daily  Continue Coreg 3.125 mg BID  Low sodium heart healthy diet  Recommend /80 or less.             Social:  Former Smoker, Alcohol Use       Hematology/Oncology:  Hematology Normal                                     EENT/Dental:  chronic allergic rhinitis           Cardiovascular:  Exercise tolerance: good   Hypertension, well controlled           hyperlipidemia                             Pulmonary:  Pulmonary Normal                       Renal/:  Renal/ Normal                 Hepatic/GI:  Hepatic/GI Normal Bowel Prep.                Musculoskeletal:     Carpal tunnel            Neurological:      Headaches                                 Endocrine:     Prediabetes.      Obesity / BMI > 30  Psych:  Psychiatric History anxiety depression              Physical Exam  General: Cooperative and Alert    Airway:  Mallampati: II   Mouth Opening: Normal  TM Distance: Normal  Tongue: Normal  Neck ROM: Normal ROM    Dental:  Intact    Chest/Lungs:  Normal Respiratory Rate    Heart:  Rate: Normal  Rhythm: Regular Rhythm      Anesthesia Plan  Type of Anesthesia, risks & benefits discussed:    Anesthesia Type: MAC, Gen Natural Airway  Intra-op Monitoring Plan: Standard ASA Monitors  Post Op Pain Control Plan: multimodal analgesia  Induction:  IV  Informed Consent: Informed consent signed with the Patient and all parties understand the risks and agree with anesthesia plan.  All questions answered.   ASA Score: 2  Day of Surgery Review of History & Physical: H&P Update referred to the surgeon/provider.    Ready For Surgery From Anesthesia Perspective.     .

## 2024-11-18 ENCOUNTER — OFFICE VISIT (OUTPATIENT)
Dept: OBSTETRICS AND GYNECOLOGY | Facility: CLINIC | Age: 49
End: 2024-11-18
Payer: COMMERCIAL

## 2024-11-18 VITALS
SYSTOLIC BLOOD PRESSURE: 114 MMHG | BODY MASS INDEX: 33.09 KG/M2 | HEIGHT: 65 IN | DIASTOLIC BLOOD PRESSURE: 68 MMHG | WEIGHT: 198.63 LBS

## 2024-11-18 DIAGNOSIS — Z01.419 WELL WOMAN EXAM WITH ROUTINE GYNECOLOGICAL EXAM: Primary | ICD-10-CM

## 2024-11-18 DIAGNOSIS — Z12.31 BREAST CANCER SCREENING BY MAMMOGRAM: ICD-10-CM

## 2024-11-18 PROCEDURE — 3074F SYST BP LT 130 MM HG: CPT | Mod: CPTII,S$GLB,, | Performed by: OBSTETRICS & GYNECOLOGY

## 2024-11-18 PROCEDURE — 3008F BODY MASS INDEX DOCD: CPT | Mod: CPTII,S$GLB,, | Performed by: OBSTETRICS & GYNECOLOGY

## 2024-11-18 PROCEDURE — 1159F MED LIST DOCD IN RCRD: CPT | Mod: CPTII,S$GLB,, | Performed by: OBSTETRICS & GYNECOLOGY

## 2024-11-18 PROCEDURE — 1160F RVW MEDS BY RX/DR IN RCRD: CPT | Mod: CPTII,S$GLB,, | Performed by: OBSTETRICS & GYNECOLOGY

## 2024-11-18 PROCEDURE — 3078F DIAST BP <80 MM HG: CPT | Mod: CPTII,S$GLB,, | Performed by: OBSTETRICS & GYNECOLOGY

## 2024-11-18 PROCEDURE — 99999 PR PBB SHADOW E&M-EST. PATIENT-LVL III: CPT | Mod: PBBFAC,,, | Performed by: OBSTETRICS & GYNECOLOGY

## 2024-11-18 PROCEDURE — 3044F HG A1C LEVEL LT 7.0%: CPT | Mod: CPTII,S$GLB,, | Performed by: OBSTETRICS & GYNECOLOGY

## 2024-11-18 PROCEDURE — 99396 PREV VISIT EST AGE 40-64: CPT | Mod: S$GLB,,, | Performed by: OBSTETRICS & GYNECOLOGY

## 2024-11-18 NOTE — PROGRESS NOTES
Subjective     Patient ID: Josh Simental is a 49 y.o. female.    Chief Complaint:  well woman exam    History of Present Illness  HPI  Presents for well-woman exam.  Pt is .  Tried HRT, but had significant cramping, so stopped taking it.  Took Estroven for hot flushes, and was eventually able to stop taking it.  No longer having bothersome hot flushes.  Has been having some deep hip achiness.  Completing 2 weeks of meloxicam, and started yoga.  Sits all day for her job, and thinks that is contributing to her hip pains.  Yoga and stretching seem to be helping.  Pt is MM with a male partner.  No pain or VB during intercourse.  Last pap: 2023: normal  MM2023: normal; has MMG 24  Colonoscopy: 10/2024: insufficient bowel prep; repeat recommended in 1 year    GYN & OB History  Patient's last menstrual period was 2022 (exact date).   Date of Last Pap: 9/10/2023    OB History    Para Term  AB Living   2 2 2 0 0 2   SAB IAB Ectopic Multiple Live Births   0 0 0 0 2      # Outcome Date GA Lbr Camron/2nd Weight Sex Type Anes PTL Lv   2 Term      Vag-Spont   YANA   1 Term      Vag-Spont   YANA       Review of Systems  Review of Systems       Objective   Physical Exam:   Constitutional: She is oriented to person, place, and time. She appears well-developed and well-nourished. No distress.      Neck: No thyromegaly present.     Pulmonary/Chest: Right breast exhibits no inverted nipple, no mass, no nipple discharge, no skin change, no tenderness, no bleeding and no swelling. Left breast exhibits no inverted nipple, no mass, no nipple discharge, no skin change, no tenderness, no bleeding and no swelling. Breasts are symmetrical.        Abdominal: Soft. She exhibits no distension and no mass. There is no abdominal tenderness. There is no rebound and no guarding.     Genitourinary:    Pelvic exam was performed with patient supine.   There is no rash, tenderness, lesion or injury on the right  labia. There is no rash, tenderness, lesion or injury on the left labia. Cervix is normal. Right adnexum displays no mass, no tenderness and no fullness. Left adnexum displays no mass, no tenderness and no fullness. No erythema, vaginal discharge, tenderness, bleeding, rectocele or cystocele in the vagina.    No foreign body in the vagina.      No signs of injury in the vagina.   Cervix exhibits no motion tenderness, no discharge and no friability. Uterus is not deviated, not enlarged, not fixed, not tender and not hosting fibroids.               Neurological: She is alert and oriented to person, place, and time.     Psychiatric: She has a normal mood and affect.            Assessment and Plan     1. Well woman exam with routine gynecological exam    2. Breast cancer screening by mammogram             Plan:  Diagnoses and all orders for this visit:    Well woman exam with routine gynecological exam    Breast cancer screening by mammogram  -     Mammo Digital Screening Bilat w/ Luisito; Future     Reviewed updated recommendations for pap smears (every 3 years) in low risk patients.   Recommend annual pelvic exams.  Reviewed recommendations for annual CBE and annual MMG.  Continue f/u with PCP for hip pain.  Continue yoga.  May benefit from PT.  RTC 1 year.

## 2024-12-06 ENCOUNTER — HOSPITAL ENCOUNTER (OUTPATIENT)
Dept: RADIOLOGY | Facility: HOSPITAL | Age: 49
Discharge: HOME | End: 2024-12-06
Attending: OBSTETRICS & GYNECOLOGY
Payer: COMMERCIAL

## 2024-12-06 DIAGNOSIS — Z12.31 BREAST CANCER SCREENING BY MAMMOGRAM: ICD-10-CM

## 2024-12-06 PROCEDURE — 77063 BREAST TOMOSYNTHESIS BI: CPT | Mod: 26,,, | Performed by: RADIOLOGY

## 2024-12-06 PROCEDURE — 77067 SCR MAMMO BI INCL CAD: CPT | Mod: TC

## 2024-12-06 PROCEDURE — 77067 SCR MAMMO BI INCL CAD: CPT | Mod: 26,,, | Performed by: RADIOLOGY

## 2025-02-28 DIAGNOSIS — M75.02 ADHESIVE CAPSULITIS OF LEFT SHOULDER: Primary | ICD-10-CM

## 2025-04-08 ENCOUNTER — OFFICE VISIT (OUTPATIENT)
Dept: DERMATOLOGY | Facility: CLINIC | Age: 50
End: 2025-04-08
Payer: COMMERCIAL

## 2025-04-08 VITALS — HEIGHT: 65 IN | BODY MASS INDEX: 33.09 KG/M2 | WEIGHT: 198.63 LBS

## 2025-04-08 DIAGNOSIS — L21.9 SEBORRHEIC DERMATITIS: Primary | ICD-10-CM

## 2025-04-08 PROCEDURE — G2211 COMPLEX E/M VISIT ADD ON: HCPCS | Mod: S$GLB,,, | Performed by: STUDENT IN AN ORGANIZED HEALTH CARE EDUCATION/TRAINING PROGRAM

## 2025-04-08 PROCEDURE — 3008F BODY MASS INDEX DOCD: CPT | Mod: CPTII,S$GLB,, | Performed by: STUDENT IN AN ORGANIZED HEALTH CARE EDUCATION/TRAINING PROGRAM

## 2025-04-08 PROCEDURE — 1159F MED LIST DOCD IN RCRD: CPT | Mod: CPTII,S$GLB,, | Performed by: STUDENT IN AN ORGANIZED HEALTH CARE EDUCATION/TRAINING PROGRAM

## 2025-04-08 PROCEDURE — 99999 PR PBB SHADOW E&M-EST. PATIENT-LVL III: CPT | Mod: PBBFAC,,, | Performed by: STUDENT IN AN ORGANIZED HEALTH CARE EDUCATION/TRAINING PROGRAM

## 2025-04-08 PROCEDURE — 1160F RVW MEDS BY RX/DR IN RCRD: CPT | Mod: CPTII,S$GLB,, | Performed by: STUDENT IN AN ORGANIZED HEALTH CARE EDUCATION/TRAINING PROGRAM

## 2025-04-08 PROCEDURE — 99214 OFFICE O/P EST MOD 30 MIN: CPT | Mod: S$GLB,,, | Performed by: STUDENT IN AN ORGANIZED HEALTH CARE EDUCATION/TRAINING PROGRAM

## 2025-04-08 RX ORDER — FLUOCINONIDE TOPICAL SOLUTION USP, 0.05% 0.5 MG/ML
SOLUTION TOPICAL 2 TIMES DAILY
Qty: 60 ML | Refills: 5 | Status: SHIPPED | OUTPATIENT
Start: 2025-04-08

## 2025-04-08 RX ORDER — HYDROCORTISONE 25 MG/G
CREAM TOPICAL 2 TIMES DAILY
Qty: 30 G | Refills: 1 | Status: SHIPPED | OUTPATIENT
Start: 2025-04-08

## 2025-04-08 NOTE — PROGRESS NOTES
Patient Information  Name: Josh Simental  : 1975  MRN: [id number]     Referring Physician:  Dr. Asif ref. provider found   Primary Care Physician:  Dianna Mari MD   Date of Visit: 2025      Subjective:       Josh Simental is a 50 y.o. female who presents for   Chief Complaint   Patient presents with    Hair/Scalp Problem     HPI  Patient is here with concern of: itching  Location: scalp, hairline, upper eyelids  Duration: 9 months  Symptoms: itching  Prior treatments: none    Patient was last seen:Visit date not found     Prior notes by myself reviewed.   Clinical documentation obtained by nursing staff reviewed.    Review of Systems   Skin:  Positive for itching. Negative for rash.        Objective:    Physical Exam   Constitutional: She appears well-developed and well-nourished. No distress.   Neurological: She is alert and oriented to person, place, and time. She is not disoriented.   Psychiatric: She has a normal mood and affect.   Skin:   Areas Examined (abnormalities noted in diagram):   Scalp / Hair Palpated and Inspected  Head / Face Inspection Performed              Diagram Legend     Erythematous scaling macule/papule c/w actinic keratosis       Vascular papule c/w angioma      Pigmented verrucoid papule/plaque c/w seborrheic keratosis      Yellow umbilicated papule c/w sebaceous hyperplasia      Irregularly shaped tan macule c/w lentigo     1-2 mm smooth white papules consistent with Milia      Movable subcutaneous cyst with punctum c/w epidermal inclusion cyst      Subcutaneous movable cyst c/w pilar cyst      Firm pink to brown papule c/w dermatofibroma      Pedunculated fleshy papule(s) c/w skin tag(s)      Evenly pigmented macule c/w junctional nevus     Mildly variegated pigmented, slightly irregular-bordered macule c/w mildly atypical nevus      Flesh colored to evenly pigmented papule c/w intradermal nevus       Pink pearly papule/plaque c/w basal cell  carcinoma      Erythematous hyperkeratotic cursted plaque c/w SCC      Surgical scar with no sign of skin cancer recurrence      Open and closed comedones      Inflammatory papules and pustules      Verrucoid papule consistent consistent with wart     Erythematous eczematous patches and plaques     Dystrophic onycholytic nail with subungual debris c/w onychomycosis     Umbilicated papule    Erythematous-base heme-crusted tan verrucoid plaque consistent with inflamed seborrheic keratosis     Erythematous Silvery Scaling Plaque c/w Psoriasis     See annotation      No images are attached to the encounter or orders placed in the encounter.    [] Data reviewed  [] Independent review of test  [] Management discussed with another provider    Assessment / Plan:        Seborrheic dermatitis ?possible secondary to insulin resistance?  -     hydrocortisone 2.5 % cream; Apply topically 2 (two) times daily. Mix with ketoconazole cream and AAA on face BID for up to 2 weeks at a time  Dispense: 30 g; Refill: 1  -     fluocinonide (LIDEX) 0.05 % external solution; Apply topically 2 (two) times daily. Use on scalp  Dispense: 60 mL; Refill: 5    Counseling on topical steroids:  Patient counseled that the prolonged use of topical steroids can result in the increased appearance superficial blood vessels (telangiectasias) lightening (hypopigmentation), and   thinning of the skin ( atrophy).  Patient understands to avoid using high potency steroids in skin folds, the groin or the face.  The patient verbalized understanding of proper use and possible adverse effects of topical steroids.  All patient's questions and concerns were addressed.             LOS NUMBER AND COMPLEXITY OF PROBLEMS    COMPLEXITY OF DATA RISK TOTAL TIME (m)   38523  83580 [] 1 self-limited or minor problem [x] Minimal to none [] No treatment recommended or patient to monitor 15-29  10-19   64874  60831 Low  [] 2 or > self limited or minor problems  [] 1 stable  chronic illness  [] 1 acute, uncomplicated illness or injury Limited (2)  [] Prior external notes from each unique source  [] Review result of each unique test  [] Order each unique test []  Low  OTC medications, minor skin biopsy 30-44  20-29   57654  87306 Moderate  [x]  1 or > chronic illness with progression, exacerbation or SE of treatment  []  2 or more stable chronic illnesses  []  1 acute illness with systemic symptoms  []  1 acute complicated injury  []  1 undiagnosed new problem with uncertain prognosis Moderate (1/3 below)  []  3 or more data items        *Now includes assessment requiring independent historian  []  Independent interpretation of a test  []  Discuss management/test with another provider Moderate  [x]  Prescription drug mgmt  []  Minor surgery with risk discussed  []  Mgmt limited by social determinates 45-59  30-39   32621  53277 High  []  1 or more chronic illness with severe exacerbation, progression or SE of treatment  []  1 acute or chronic illness/injury that poses a threat to life or bodily function Extensive (2/3 below)  []  3 or more data items        *Now includes assessment requiring independent historian.  []  Independent interpretation of a test  []  Discuss management/test with another provider High  []  Major surgery with risk discussed  []  Drug therapy requiring intensive monitoring for toxicity  []  Hospitalization  []  Decision for DNR 60-74  40-54      No follow-ups on file.    Samina Zaldivar MD, FAAD  [facility]

## 2025-04-09 ENCOUNTER — PATIENT MESSAGE (OUTPATIENT)
Dept: DERMATOLOGY | Facility: CLINIC | Age: 50
End: 2025-04-09
Payer: COMMERCIAL

## 2025-04-10 DIAGNOSIS — L21.9 SEBORRHEIC DERMATITIS: Primary | ICD-10-CM

## 2025-04-10 RX ORDER — KETOCONAZOLE 20 MG/G
CREAM TOPICAL 2 TIMES DAILY
Qty: 30 G | Refills: 1 | Status: SHIPPED | OUTPATIENT
Start: 2025-04-10

## 2025-04-28 ENCOUNTER — LAB VISIT (OUTPATIENT)
Dept: LAB | Facility: HOSPITAL | Age: 50
End: 2025-04-28
Attending: FAMILY MEDICINE
Payer: COMMERCIAL

## 2025-04-28 DIAGNOSIS — R73.03 PREDIABETES: ICD-10-CM

## 2025-04-28 PROCEDURE — 85025 COMPLETE CBC W/AUTO DIFF WBC: CPT

## 2025-04-28 PROCEDURE — 83036 HEMOGLOBIN GLYCOSYLATED A1C: CPT

## 2025-04-28 PROCEDURE — 80061 LIPID PANEL: CPT

## 2025-04-28 PROCEDURE — 84443 ASSAY THYROID STIM HORMONE: CPT

## 2025-04-28 PROCEDURE — 80053 COMPREHEN METABOLIC PANEL: CPT

## 2025-04-28 PROCEDURE — 36415 COLL VENOUS BLD VENIPUNCTURE: CPT | Mod: PO

## 2025-04-29 LAB
ABSOLUTE EOSINOPHIL (OHS): 0.26 K/UL
ABSOLUTE MONOCYTE (OHS): 0.33 K/UL (ref 0.3–1)
ABSOLUTE NEUTROPHIL COUNT (OHS): 3.13 K/UL (ref 1.8–7.7)
ALBUMIN SERPL BCP-MCNC: 3.9 G/DL (ref 3.5–5.2)
ALP SERPL-CCNC: 78 UNIT/L (ref 40–150)
ALT SERPL W/O P-5'-P-CCNC: 24 UNIT/L (ref 10–44)
ANION GAP (OHS): 10 MMOL/L (ref 8–16)
AST SERPL-CCNC: 18 UNIT/L (ref 11–45)
BASOPHILS # BLD AUTO: 0.04 K/UL
BASOPHILS NFR BLD AUTO: 0.8 %
BILIRUB SERPL-MCNC: 0.3 MG/DL (ref 0.1–1)
BUN SERPL-MCNC: 19 MG/DL (ref 6–20)
CALCIUM SERPL-MCNC: 9.5 MG/DL (ref 8.7–10.5)
CHLORIDE SERPL-SCNC: 107 MMOL/L (ref 95–110)
CHOLEST SERPL-MCNC: 197 MG/DL (ref 120–199)
CHOLEST/HDLC SERPL: 2.9 {RATIO} (ref 2–5)
CO2 SERPL-SCNC: 26 MMOL/L (ref 23–29)
CREAT SERPL-MCNC: 0.7 MG/DL (ref 0.5–1.4)
EAG (OHS): 111 MG/DL (ref 68–131)
ERYTHROCYTE [DISTWIDTH] IN BLOOD BY AUTOMATED COUNT: 12.7 % (ref 11.5–14.5)
GFR SERPLBLD CREATININE-BSD FMLA CKD-EPI: >60 ML/MIN/1.73/M2
GLUCOSE SERPL-MCNC: 83 MG/DL (ref 70–110)
HBA1C MFR BLD: 5.5 % (ref 4–5.6)
HCT VFR BLD AUTO: 40.7 % (ref 37–48.5)
HDLC SERPL-MCNC: 69 MG/DL (ref 40–75)
HDLC SERPL: 35 % (ref 20–50)
HGB BLD-MCNC: 12.5 GM/DL (ref 12–16)
IMM GRANULOCYTES # BLD AUTO: 0.01 K/UL (ref 0–0.04)
IMM GRANULOCYTES NFR BLD AUTO: 0.2 % (ref 0–0.5)
LDLC SERPL CALC-MCNC: 109.2 MG/DL (ref 63–159)
LYMPHOCYTES # BLD AUTO: 1.52 K/UL (ref 1–4.8)
MCH RBC QN AUTO: 28.7 PG (ref 27–31)
MCHC RBC AUTO-ENTMCNC: 30.7 G/DL (ref 32–36)
MCV RBC AUTO: 94 FL (ref 82–98)
NONHDLC SERPL-MCNC: 128 MG/DL
NUCLEATED RBC (/100WBC) (OHS): 0 /100 WBC
PLATELET # BLD AUTO: 287 K/UL (ref 150–450)
PMV BLD AUTO: 10.7 FL (ref 9.2–12.9)
POTASSIUM SERPL-SCNC: 4.5 MMOL/L (ref 3.5–5.1)
PROT SERPL-MCNC: 7.2 GM/DL (ref 6–8.4)
RBC # BLD AUTO: 4.35 M/UL (ref 4–5.4)
RELATIVE EOSINOPHIL (OHS): 4.9 %
RELATIVE LYMPHOCYTE (OHS): 28.7 % (ref 18–48)
RELATIVE MONOCYTE (OHS): 6.2 % (ref 4–15)
RELATIVE NEUTROPHIL (OHS): 59.2 % (ref 38–73)
SODIUM SERPL-SCNC: 143 MMOL/L (ref 136–145)
TRIGL SERPL-MCNC: 94 MG/DL (ref 30–150)
TSH SERPL-ACNC: 2.46 UIU/ML (ref 0.4–4)
WBC # BLD AUTO: 5.29 K/UL (ref 3.9–12.7)

## 2025-05-15 ENCOUNTER — LAB VISIT (OUTPATIENT)
Dept: LAB | Facility: HOSPITAL | Age: 50
End: 2025-05-15
Attending: FAMILY MEDICINE
Payer: COMMERCIAL

## 2025-05-15 ENCOUNTER — OFFICE VISIT (OUTPATIENT)
Dept: INTERNAL MEDICINE | Facility: CLINIC | Age: 50
End: 2025-05-15
Payer: COMMERCIAL

## 2025-05-15 VITALS
BODY MASS INDEX: 32.58 KG/M2 | TEMPERATURE: 98 F | OXYGEN SATURATION: 98 % | SYSTOLIC BLOOD PRESSURE: 122 MMHG | HEART RATE: 77 BPM | WEIGHT: 195.56 LBS | DIASTOLIC BLOOD PRESSURE: 66 MMHG | HEIGHT: 65 IN

## 2025-05-15 DIAGNOSIS — M25.551 BILATERAL HIP PAIN: ICD-10-CM

## 2025-05-15 DIAGNOSIS — R43.2 DYSGEUSIA: ICD-10-CM

## 2025-05-15 DIAGNOSIS — R73.03 PREDIABETES: Primary | ICD-10-CM

## 2025-05-15 DIAGNOSIS — M25.552 BILATERAL HIP PAIN: ICD-10-CM

## 2025-05-15 PROBLEM — E66.9 OBESE: Status: RESOLVED | Noted: 2019-08-28 | Resolved: 2025-05-15

## 2025-05-15 PROCEDURE — G2211 COMPLEX E/M VISIT ADD ON: HCPCS | Mod: S$GLB,,, | Performed by: FAMILY MEDICINE

## 2025-05-15 PROCEDURE — 99999 PR PBB SHADOW E&M-EST. PATIENT-LVL IV: CPT | Mod: PBBFAC,,, | Performed by: FAMILY MEDICINE

## 2025-05-15 PROCEDURE — 99214 OFFICE O/P EST MOD 30 MIN: CPT | Mod: S$GLB,,, | Performed by: FAMILY MEDICINE

## 2025-05-15 PROCEDURE — 36415 COLL VENOUS BLD VENIPUNCTURE: CPT | Mod: PO

## 2025-05-15 PROCEDURE — 3074F SYST BP LT 130 MM HG: CPT | Mod: CPTII,S$GLB,, | Performed by: FAMILY MEDICINE

## 2025-05-15 PROCEDURE — 3008F BODY MASS INDEX DOCD: CPT | Mod: CPTII,S$GLB,, | Performed by: FAMILY MEDICINE

## 2025-05-15 PROCEDURE — 1159F MED LIST DOCD IN RCRD: CPT | Mod: CPTII,S$GLB,, | Performed by: FAMILY MEDICINE

## 2025-05-15 PROCEDURE — 84630 ASSAY OF ZINC: CPT

## 2025-05-15 PROCEDURE — 3044F HG A1C LEVEL LT 7.0%: CPT | Mod: CPTII,S$GLB,, | Performed by: FAMILY MEDICINE

## 2025-05-15 PROCEDURE — 3078F DIAST BP <80 MM HG: CPT | Mod: CPTII,S$GLB,, | Performed by: FAMILY MEDICINE

## 2025-05-15 RX ORDER — METFORMIN HYDROCHLORIDE 500 MG/1
500 TABLET, EXTENDED RELEASE ORAL 2 TIMES DAILY WITH MEALS
Qty: 180 TABLET | Refills: 3 | Status: SHIPPED | OUTPATIENT
Start: 2025-05-15

## 2025-05-15 RX ORDER — MELOXICAM 15 MG/1
15 TABLET ORAL DAILY PRN
Qty: 90 TABLET | Refills: 1 | Status: SHIPPED | OUTPATIENT
Start: 2025-05-15

## 2025-05-15 NOTE — PROGRESS NOTES
Subjective:      Patient ID: Josh Simental is a 50 y.o. female.    Chief Complaint: Follow-up      History of Present Illness    CHIEF COMPLAINT:  Ms. Simental presents today for routine follow up. Labs drawn earlier discussed today with the patient.    MUSCULOSKELETAL:  She reports bilateral hip pain, primarily affecting the left side with occasional right-sided flare-ups. She experiences tenderness to touch during periods of inflammation. Pain is exacerbated by certain lower body exercises at the gym, which she attends 3 times weekly. Symptoms are particularly severe after prolonged sitting, especially when initiating movement. She reports morning limping upon getting out of bed. Back strengthening exercises and yoga have improved back symptoms but have not alleviated hip pain.  She has had this for more than 6 months now, did take meloxicam for 2 weeks which helped while she was taking it.    MEDICATIONS:  She has been out of Metformin for about three days. Since increasing the Metformin dosage, she experienced significant diarrhea for the first three months, which has now subsided to daily stomach cramps. She also notes a dramatic dulling of her sense of taste over the last few months, which she attributes to the medication. She acknowledges significant positive impact on her glucose numbers with Metformin. She currently uses Bernards.    LABS:  A1C has returned to normal range following previous slight elevation. Blood count is within normal limits with slightly low mean corpuscular hemoglobin concentration.    MEDICAL HISTORY:  She contracted COVID-19 during the previous summer.        Past Medical History:   Diagnosis Date    Allergy     Anxiety     Depression     Endometriosis     Endometriosis 4/17/2019 10:14:15 AM    [facility] [place] Historical - DO NOT USE: Endometriosis-No Additional Notes    Insulin resistance     Migraines     Other depressive disorder 4/17/2019 10:12:51 AM    [facility] [place]  "Historical - [facility]: Depression-No Additional Notes    Pure hypercholesterolemia 6/30/2021 9:34:13 AM    [facility] Historical - Endocrine/Metabolic/Immune: Hypercholesterolemia-No Additional Notes    Sinusitis           Past Surgical History:   Procedure Laterality Date    CARPAL TUNNEL RELEASE Left 07/14/2022    Procedure: RELEASE, CARPAL TUNNEL;  Surgeon: Daryl Glover MD;  Location: [facility];  Service: Orthopedics;  Laterality: Left;    CHOLECYSTECTOMY  07/2017    COLONOSCOPY N/A 10/14/2024    Procedure: COLONOSCOPY;  Surgeon: Christelle Corey MD;  Location: [facility] ENDO;  Service: Endoscopy;  Laterality: N/A;    ESOPHAGOGASTRODUODENOSCOPY N/A 6/16/2023    Procedure: EGD (ESOPHAGOGASTRODUODENOSCOPY);  Surgeon: Eileen Yo MD;  Location: [facility];  Service: Endoscopy;  Laterality: N/A;    EYE SURGERY      LASIK    GALLBLADDER SURGERY  07/2017    hysteroscope      LAPAROSCOPY  1994    dx 2/2 endometriosis    TUBAL LIGATION       Family History   Problem Relation Name Age of Onset    Stroke Paternal Grandmother      Cancer Maternal Grandmother          Cervical     Diabetes Maternal Grandfather      Cancer Maternal Grandfather          Prostate    Diabetes Father      Cancer Father          Renal     Diabetes Mother      Thyroid disease Mother      Breast cancer Other great aunt      Social History[1]  Review of patient's allergies indicates:   Allergen Reactions    No known drug allergies        Review of Systems   Eyes:  Positive for blurred vision.   Musculoskeletal:  Positive for joint pain.   Neurological:  Positive for headaches.     Objective:       /66   Pulse 77   Temp 98.1 °F (36.7 °C) (Tympanic)   Ht 5' 5" (1.651 m)   Wt 88.7 kg (195 lb 8.8 oz)   LMP 08/06/2022 (Exact Date)   SpO2 98%   BMI 32.54 kg/m²   Physical Exam             Physical Exam  Vitals reviewed.   Constitutional:       General: She is not in acute distress.     Appearance: Normal appearance. She is " well-developed. She is not ill-appearing or diaphoretic.   HENT:      Head: Normocephalic and atraumatic.      Right Ear: Hearing, tympanic membrane, ear canal and external ear normal.      Left Ear: Hearing, tympanic membrane, ear canal and external ear normal.      Nose: Nose normal.      Mouth/Throat:      Pharynx: Uvula midline. No oropharyngeal exudate.   Eyes:      Conjunctiva/sclera: Conjunctivae normal.      Pupils: Pupils are equal, round, and reactive to light.   Neck:      Thyroid: No thyromegaly.      Trachea: No tracheal deviation.   Cardiovascular:      Rate and Rhythm: Normal rate and regular rhythm.      Heart sounds: Normal heart sounds. No murmur heard.  Pulmonary:      Effort: Pulmonary effort is normal. No respiratory distress.      Breath sounds: Normal breath sounds.   Abdominal:      General: Bowel sounds are normal.      Palpations: Abdomen is soft.      Tenderness: There is no abdominal tenderness. There is no guarding.      Hernia: No hernia is present.   Musculoskeletal:         General: No tenderness. Normal range of motion.   Skin:     General: Skin is warm and dry.      Capillary Refill: Capillary refill takes less than 2 seconds.   Neurological:      General: No focal deficit present.      Mental Status: She is alert and oriented to person, place, and time.   Psychiatric:         Mood and Affect: Mood normal.         Behavior: Behavior normal.         Thought Content: Thought content normal.         Judgment: Judgment normal.         Assessment:     1. Prediabetes    2. Bilateral hip pain    3. Dysgeusia      Plan:   Assessment & Plan    MEDICAL DECISION MAKING:  - Hip pain potentially bursitis or inflammation, given normal hip XRs from last year.  - Evaluated effectiveness of previous meloxicam treatment for hip pain.  - A1C back to normal range, kidney and liver function normal, electrolytes and cholesterol levels stable.  - Assessed metformin side effects, including abdominal issues  and reported taste changes.  - Zinc deficiency potential cause of taste changes (dysgeusia).  - Evaluated options for diabetes management, weighing benefits of metformin against side effects.  - Considered smooth muscle relaxant (Bentyl) for abdominal cramping but decided against adding medication to manage side effects.  - Explained MCHC (mean corpuscular hemoglobin concentration) lab result, noting slight low value as likely normal for patient.    MEDICATIONS:  - Started meloxicam as needed for hip pain with instructions to take with food, stop if experiencing pain in left upper abdominal area, and take breaks when hip is not bothering patient.  - Continued metformin with option to decrease to daily if needed, while monitoring diet carefully.  - Refilled Bernards for 90 days.    ORDERS:  - Ordered zinc level blood test.    REFERRALS:  - Referred to orthopedics for evaluation of hip pain.  - Follow up with orthopedic PA tomorrow at 4:00 PM.    FOLLOW UP:  - Follow up in 6 months for regular lab work.        Prediabetes  -     Hemoglobin A1C; Future; Expected date: 11/11/2025  -     Comprehensive Metabolic Panel; Future; Expected date: 11/11/2025  -     Lipid Panel; Future; Expected date: 11/11/2025  -     CBC Auto Differential; Future; Expected date: 11/11/2025    Bilateral hip pain  -     Ambulatory referral/consult to Orthopedics; Future; Expected date: 05/22/2025    Dysgeusia  -     Zinc; Future; Expected date: 05/15/2025  -     Zinc; Future; Expected date: 05/15/2025  -     Zinc; Future; Expected date: 05/15/2025    Other orders  -     metFORMIN (GLUCOPHAGE-XR) 500 MG ER 24hr tablet; Take 1 tablet (500 mg total) by mouth 2 (two) times daily with meals.  Dispense: 180 tablet; Refill: 3  -     meloxicam (MOBIC) 15 MG tablet; Take 1 tablet (15 mg total) by mouth daily as needed for Pain. Take with meal.  Dispense: 90 tablet; Refill: 1      Medication List with Changes/Refills   Changed and/or Refilled Medications     Modified Medication Previous Medication    MELOXICAM (MOBIC) 15 MG TABLET meloxicam (MOBIC) 15 MG tablet       Take 1 tablet (15 mg total) by mouth daily as needed for Pain. Take with meal.    Take 1 tablet (15 mg total) by mouth once daily. Take with meal.    METFORMIN (GLUCOPHAGE-XR) 500 MG ER 24HR TABLET metFORMIN (GLUCOPHAGE-XR) 500 MG ER 24hr tablet       Take 1 tablet (500 mg total) by mouth 2 (two) times daily with meals.    Take 1 tablet (500 mg total) by mouth 2 (two) times daily with meals.   Discontinued Medications    FLUOCINONIDE (LIDEX) 0.05 % EXTERNAL SOLUTION    Apply topically 2 (two) times daily. Use on scalp    HYDROCORTISONE 2.5 % CREAM    Apply topically 2 (two) times daily. Mix with ketoconazole cream and AAA on face BID for up to 2 weeks at a time    IPRATROPIUM (ATROVENT) 21 MCG (0.03 %) NASAL SPRAY    2 sprays by Each Nostril route 3 (three) times daily.    KETOCONAZOLE (NIZORAL) 2 % CREAM    Apply topically 2 (two) times daily. Mix with hydrocortisone cream and AAA on face BID for up to 2 weeks at a time    LEVOCETIRIZINE (XYZAL) 5 MG TABLET    Take 5 mg by mouth every evening.    MICROFIBRILLAR COLLAGEN POWDER    Apply topically once daily. 1 TBS daily    TRIAMCINOLONE ACETONIDE 0.1% (KENALOG) 0.1 % OINTMENT    Apply topically 2 (two) times daily.       This note was generated with the assistance of ambient listening technology. Verbal consent was obtained by the patient and accompanying visitor(s) for the recording of patient appointment to facilitate this note. I attest to having reviewed and edited the generated note for accuracy, though some syntax or spelling errors may persist. Please contact the author of this note for any clarification.            [1]   Social History  Socioeconomic History    Marital status:    Tobacco Use    Smoking status: Former     Current packs/day: 0.00     Average packs/day: 0.3 packs/day for 2.0 years (0.5 ttl pk-yrs)     Types: Cigarettes      Start date: 1997     Quit date: 1999     Years since quittin.9    Smokeless tobacco: Never    Tobacco comments:     Does not smoke   Substance and Sexual Activity    Alcohol use: Yes     Comment: rarely    Drug use: No    Sexual activity: Yes     Partners: Male     Birth control/protection: None     Social Drivers of Health     Financial Resource Strain: Low Risk  (2025)    Overall Financial Resource Strain (CARDIA)     Difficulty of Paying Living Expenses: Not hard at all   Food Insecurity: No Food Insecurity (2025)    Hunger Vital Sign     Worried About Running Out of Food in the Last Year: Never true     Ran Out of Food in the Last Year: Never true   Transportation Needs: No Transportation Needs (2025)    PRAPARE - Transportation     Lack of Transportation (Medical): No     Lack of Transportation (Non-Medical): No   Physical Activity: Sufficiently Active (2025)    Exercise Vital Sign     Days of Exercise per Week: 3 days     Minutes of Exercise per Session: 60 min   Stress: No Stress Concern Present (2025)    [employer] - Occupational Stress Questionnaire     Feeling of Stress : Only a little   Housing Stability: Low Risk  (2025)    Housing Stability Vital Sign     Unable to Pay for Housing in the Last Year: No     Number of Times Moved in the Last Year: 0     Homeless in the Last Year: No

## 2025-05-19 ENCOUNTER — RESULTS FOLLOW-UP (OUTPATIENT)
Dept: INTERNAL MEDICINE | Facility: CLINIC | Age: 50
End: 2025-05-19

## 2025-05-19 LAB — W ZINC: 77 UG/DL

## (undated) DEVICE — COVER CAMERA OPERATING ROOM

## (undated) DEVICE — BANDAGE ELASTIC 3X5 VELCRO ST

## (undated) DEVICE — GLOVE SURGICAL LATEX SZ 6.5

## (undated) DEVICE — POSITIONER HEAD DONUT 9IN FOAM

## (undated) DEVICE — GAUZE SPONGE 4X4 12PLY

## (undated) DEVICE — PAD ABD 8X10 STERILE

## (undated) DEVICE — DRESSING XEROFORM FOIL PK 1X8

## (undated) DEVICE — UNDERGLOVES BIOGEL PI SIZE 7.5

## (undated) DEVICE — SYR 10CC LUER LOCK

## (undated) DEVICE — SUPPORT ULNA NERVE PROTECTOR

## (undated) DEVICE — SEE MEDLINE ITEM 157173

## (undated) DEVICE — DRAPE PLASTIC U 60X72

## (undated) DEVICE — NDL SAFETY 25G X 1.5 ECLIPSE

## (undated) DEVICE — SUT 4-0 ETHILON 18 PS-2

## (undated) DEVICE — SCRUB HIBICLENS 4% CHG 4OZ

## (undated) DEVICE — GOWN SURG 2XL DISP TIE BACK

## (undated) DEVICE — UNDERGLOVES BIOGEL PI SIZE 8.5

## (undated) DEVICE — GLOVE SURGICAL LATEX SZ 7

## (undated) DEVICE — COVER LIGHT HANDLE 80/CA

## (undated) DEVICE — SEE MEDLINE ITEM 157131

## (undated) DEVICE — SEE MEDLINE ITEM 157117

## (undated) DEVICE — TOURNIQUET SB QC DP 18X4IN

## (undated) DEVICE — UNDERGLOVES BIOGEL PI SZ 7 LF

## (undated) DEVICE — UNDERGLOVE BIOGEL PI SZ 6.5 LF

## (undated) DEVICE — BANDAGE ESMARK ELASTIC ST 4X9

## (undated) DEVICE — TOWEL OR DISP STRL BLUE 4/PK

## (undated) DEVICE — SOL 9P NACL IRR PIC IL

## (undated) DEVICE — PAD CAST SPECIALIST STRL 3

## (undated) DEVICE — ALCOHOL 70% ISOP RUBBING 4OZ

## (undated) DEVICE — GLOVE BIOGEL SZ 8 1/2

## (undated) DEVICE — SEE MEDLINE ITEM 157027

## (undated) DEVICE — APPLICATOR CHLORAPREP ORN 26ML